# Patient Record
Sex: FEMALE | Race: WHITE | NOT HISPANIC OR LATINO | Employment: UNEMPLOYED | URBAN - NONMETROPOLITAN AREA
[De-identification: names, ages, dates, MRNs, and addresses within clinical notes are randomized per-mention and may not be internally consistent; named-entity substitution may affect disease eponyms.]

---

## 2017-01-11 ENCOUNTER — LAB (OUTPATIENT)
Dept: CARDIOLOGY | Facility: CLINIC | Age: 57
End: 2017-01-11

## 2017-01-11 DIAGNOSIS — E53.8 VITAMIN B12 DEFICIENCY: Primary | ICD-10-CM

## 2017-01-11 DIAGNOSIS — E78.2 MIXED HYPERLIPIDEMIA: ICD-10-CM

## 2017-01-11 LAB
ALBUMIN SERPL-MCNC: 4.2 G/DL (ref 3.5–5)
ALBUMIN/GLOB SERPL: 1.4 G/DL (ref 1.5–2.5)
ALP SERPL-CCNC: 77 U/L (ref 46–116)
ALT SERPL W P-5'-P-CCNC: 22 U/L (ref 10–36)
ANION GAP SERPL CALCULATED.3IONS-SCNC: 6.5 MMOL/L (ref 3.6–11.2)
AST SERPL-CCNC: 20 U/L (ref 10–30)
BASOPHILS # BLD AUTO: 0.03 10*3/MM3 (ref 0–0.3)
BASOPHILS NFR BLD AUTO: 0.6 % (ref 0–2)
BILIRUB SERPL-MCNC: 0.5 MG/DL (ref 0.2–1.8)
BUN BLD-MCNC: 16 MG/DL (ref 7–21)
BUN/CREAT SERPL: 18.4 (ref 7–25)
CALCIUM SPEC-SCNC: 9.7 MG/DL (ref 7.7–10)
CHLORIDE SERPL-SCNC: 109 MMOL/L (ref 99–112)
CHOLEST SERPL-MCNC: 225 MG/DL (ref 0–200)
CK SERPL-CCNC: 102 U/L (ref 24–173)
CO2 SERPL-SCNC: 27.5 MMOL/L (ref 24.3–31.9)
CREAT BLD-MCNC: 0.87 MG/DL (ref 0.43–1.29)
DEPRECATED RDW RBC AUTO: 41.5 FL (ref 37–54)
EOSINOPHIL # BLD AUTO: 0.13 10*3/MM3 (ref 0–0.7)
EOSINOPHIL NFR BLD AUTO: 2.4 % (ref 0–5)
ERYTHROCYTE [DISTWIDTH] IN BLOOD BY AUTOMATED COUNT: 12.8 % (ref 11.5–14.5)
GFR SERPL CREATININE-BSD FRML MDRD: 67 ML/MIN/1.73
GLOBULIN UR ELPH-MCNC: 2.9 GM/DL
GLUCOSE BLD-MCNC: 90 MG/DL (ref 70–110)
HCT VFR BLD AUTO: 40.5 % (ref 37–47)
HDLC SERPL-MCNC: 48 MG/DL (ref 60–100)
HGB BLD-MCNC: 12.7 G/DL (ref 12–16)
IMM GRANULOCYTES # BLD: 0.02 10*3/MM3 (ref 0–0.03)
IMM GRANULOCYTES NFR BLD: 0.4 % (ref 0–0.5)
LDLC SERPL CALC-MCNC: 142 MG/DL (ref 0–100)
LDLC/HDLC SERPL: 2.97 {RATIO}
LYMPHOCYTES # BLD AUTO: 1.71 10*3/MM3 (ref 1–3)
LYMPHOCYTES NFR BLD AUTO: 32 % (ref 21–51)
MCH RBC QN AUTO: 28.2 PG (ref 27–33)
MCHC RBC AUTO-ENTMCNC: 31.4 G/DL (ref 33–37)
MCV RBC AUTO: 90 FL (ref 80–94)
MONOCYTES # BLD AUTO: 0.46 10*3/MM3 (ref 0.1–0.9)
MONOCYTES NFR BLD AUTO: 8.6 % (ref 0–10)
NEUTROPHILS # BLD AUTO: 2.99 10*3/MM3 (ref 1.4–6.5)
NEUTROPHILS NFR BLD AUTO: 56 % (ref 30–70)
OSMOLALITY SERPL CALC.SUM OF ELEC: 285.7 MOSM/KG (ref 273–305)
PLATELET # BLD AUTO: 245 10*3/MM3 (ref 130–400)
PMV BLD AUTO: 10.4 FL (ref 6–10)
POTASSIUM BLD-SCNC: 4.3 MMOL/L (ref 3.5–5.3)
PROT SERPL-MCNC: 7.1 G/DL (ref 6–8)
RBC # BLD AUTO: 4.5 10*6/MM3 (ref 4.2–5.4)
SODIUM BLD-SCNC: 143 MMOL/L (ref 135–153)
TRIGL SERPL-MCNC: 173 MG/DL (ref 0–150)
VIT B12 BLD-MCNC: 561 PG/ML (ref 211–911)
VLDLC SERPL-MCNC: 34.6 MG/DL
WBC NRBC COR # BLD: 5.34 10*3/MM3 (ref 4.5–12.5)

## 2017-01-11 PROCEDURE — 82607 VITAMIN B-12: CPT | Performed by: INTERNAL MEDICINE

## 2017-01-11 PROCEDURE — 82550 ASSAY OF CK (CPK): CPT | Performed by: INTERNAL MEDICINE

## 2017-01-11 PROCEDURE — 85025 COMPLETE CBC W/AUTO DIFF WBC: CPT | Performed by: INTERNAL MEDICINE

## 2017-01-11 PROCEDURE — 80053 COMPREHEN METABOLIC PANEL: CPT | Performed by: INTERNAL MEDICINE

## 2017-01-11 PROCEDURE — 80061 LIPID PANEL: CPT | Performed by: INTERNAL MEDICINE

## 2017-01-13 ENCOUNTER — OFFICE VISIT (OUTPATIENT)
Dept: CARDIOLOGY | Facility: CLINIC | Age: 57
End: 2017-01-13

## 2017-01-13 VITALS
HEIGHT: 67 IN | HEART RATE: 75 BPM | DIASTOLIC BLOOD PRESSURE: 84 MMHG | SYSTOLIC BLOOD PRESSURE: 136 MMHG | WEIGHT: 203.2 LBS | OXYGEN SATURATION: 98 % | BODY MASS INDEX: 31.89 KG/M2

## 2017-01-13 DIAGNOSIS — J30.2 OTHER SEASONAL ALLERGIC RHINITIS: ICD-10-CM

## 2017-01-13 DIAGNOSIS — F32.A DEPRESSION, UNSPECIFIED DEPRESSION TYPE: ICD-10-CM

## 2017-01-13 DIAGNOSIS — E78.2 MIXED HYPERLIPIDEMIA: Primary | ICD-10-CM

## 2017-01-13 DIAGNOSIS — K21.9 GASTROESOPHAGEAL REFLUX DISEASE WITHOUT ESOPHAGITIS: ICD-10-CM

## 2017-01-13 DIAGNOSIS — I10 ESSENTIAL HYPERTENSION: ICD-10-CM

## 2017-01-13 PROCEDURE — 99214 OFFICE O/P EST MOD 30 MIN: CPT | Performed by: INTERNAL MEDICINE

## 2017-01-13 RX ORDER — ATORVASTATIN CALCIUM 10 MG/1
10 TABLET, FILM COATED ORAL DAILY
Qty: 90 TABLET | Refills: 3 | Status: SHIPPED | OUTPATIENT
Start: 2017-01-13 | End: 2017-08-23

## 2017-01-13 RX ORDER — LISINOPRIL 10 MG/1
10 TABLET ORAL DAILY
Qty: 90 TABLET | Refills: 1 | Status: SHIPPED | OUTPATIENT
Start: 2017-01-13 | End: 2017-08-23

## 2017-01-13 RX ORDER — LORATADINE 10 MG/1
10 CAPSULE, LIQUID FILLED ORAL DAILY
Qty: 90 EACH | Refills: 1 | Status: SHIPPED | OUTPATIENT
Start: 2017-01-13 | End: 2017-08-23 | Stop reason: SDUPTHER

## 2017-01-13 RX ORDER — ONDANSETRON 4 MG/1
4 TABLET, FILM COATED ORAL EVERY 8 HOURS PRN
Qty: 30 TABLET | Refills: 0 | Status: SHIPPED | OUTPATIENT
Start: 2017-01-13 | End: 2017-08-23 | Stop reason: SDUPTHER

## 2017-01-13 RX ORDER — PANTOPRAZOLE SODIUM 40 MG/1
40 TABLET, DELAYED RELEASE ORAL DAILY
Qty: 90 TABLET | Refills: 1 | Status: SHIPPED | OUTPATIENT
Start: 2017-01-13 | End: 2017-08-26 | Stop reason: SDUPTHER

## 2017-01-13 NOTE — MR AVS SNAPSHOT
Sona Rosa   1/13/2017 3:00 PM   Office Visit    Dept Phone:  [phone]   Encounter #:  [id number]    Provider:  Evelyn Espinoza MD   Department:  [facility] CARDIOLOGY                Your Full Care Plan              Today's Medication Changes          These changes are accurate as of: 1/13/17  3:50 PM.  If you have any questions, ask your nurse or doctor.               New Medication(s)Ordered:     atorvastatin 10 MG tablet   Commonly known as:  LIPITOR   Take 1 tablet by mouth Daily.   Started by:  Evelyn Espinoza MD            Where to Get Your Medications      These medications were sent to [facility] [address] [place] [address] - [address] AT [facility] [address] - [phone] PH - [phone] [place] [address], [facility] [address]     Phone:  [phone]     atorvastatin 10 MG tablet    lisinopril 10 MG tablet    Loratadine 10 MG capsule    ondansetron 4 MG tablet    pantoprazole 40 MG EC tablet    sertraline 50 MG tablet                  Your Updated Medication List          This list is accurate as of: 1/13/17  3:50 PM.  Always use your most recent med list.                atorvastatin 10 MG tablet   Commonly known as:  LIPITOR   Take 1 tablet by mouth Daily.       B-12 IJ       cholecalciferol 1000 UNITS tablet   Commonly known as:  VITAMIN D3       lisinopril 10 MG tablet   Commonly known as:  PRINIVIL,ZESTRIL   Take 1 tablet by mouth Daily.       Loratadine 10 MG capsule   Commonly known as:  CLARITIN   Take 10 mg by mouth Daily.       ondansetron 4 MG tablet   Commonly known as:  ZOFRAN   Take 1 tablet by mouth Every 8 (Eight) Hours As Needed for nausea or vomiting.       oxybutynin XL 10 MG 24 hr tablet   Commonly known as:  DITROPAN XL   Take 1 tablet by mouth Daily.       pantoprazole 40 MG EC tablet   Commonly known as:  PROTONIX   Take 1 tablet by mouth Daily.       sertraline 50 MG tablet   Commonly  known as:  ZOLOFT   Take 1 tablet by mouth Daily.               You Were Diagnosed With        Codes Comments    Mixed hyperlipidemia    -  Primary ICD-10-CM: E78.2  ICD-9-CM: 272.2     Gastroesophageal reflux disease without esophagitis     ICD-10-CM: K21.9  ICD-9-CM: 530.81     Essential hypertension     ICD-10-CM: I10  ICD-9-CM: 401.9     Other seasonal allergic rhinitis     ICD-10-CM: J30.2  ICD-9-CM: 477.8     Depression, unspecified depression type     ICD-10-CM: F32.9  ICD-9-CM: 311       Instructions     None    Patient Instructions History      Upcoming Appointments     Visit Type Date Time Department    FOLLOW UP 2017  3:00 PM [facility] [place]    LAB 3/27/2017  9:20 AM [facility] [place]    FOLLOW UP 3/31/2017  3:00 PM [facility] Signup     [facility] [employer] allows you to send messages to your doctor, view your test results, renew your prescriptions, schedule appointments, and more. To sign up, go to [employer] and click on the Sign Up Now link in the New User? box. Enter your [employer] Activation Code exactly as it appears below along with the last four digits of your Social Security Number and your Date of Birth () to complete the sign-up process. If you do not sign up before the expiration date, you must request a new code.    [employer] Activation Code: [email or web address]  Expires: 2017 10:15 AM    If you have questions, you can email [email or web address] or call [phone] to talk to our [employer] staff. Remember, [employer] is NOT to be used for urgent needs. For medical emergencies, dial 911.               Other Info from Your Visit           Your Appointments     Mar 27, 2017  9:20 AM EDT   Lab with [facility], CARD COR   [facility] CARDIOLOGY (--)    [address] [place] [place] [address]   [phone]            Mar 31, 2017  3:00 PM EDT   Follow Up with Evelyn Espinoza MD   [facility]  "CARDIOLOGY (--)    [address] [place] [address] [phone]           Arrive 15 minutes prior to appointment.              Allergies     No Known Allergies      Reason for Visit     Palpitations     Hypertension     Hyperlipidemia     Heartburn           Vital Signs     Blood Pressure Pulse Height Weight Oxygen Saturation Body Mass Index    136/84 (BP Location: Left arm, Patient Position: Sitting) 75 67\" (170.2 cm) 203 lb 3.2 oz (92.2 kg) 98% 31.83 kg/m2    Smoking Status                   Never Smoker           Problems and Diagnoses Noted     Acid reflux disease    High cholesterol or triglycerides    High blood pressure    Other seasonal allergic rhinitis        Depression            "

## 2017-01-13 NOTE — PROGRESS NOTES
subjective     Chief Complaint   Patient presents with   • Palpitations   • Hypertension   • Hyperlipidemia   • Heartburn     History of Present Illness  Fatigue  Patient is under a lot of stress.  She states that she is always tired and sleepy but unable to sleep  Patient is taking Zoloft.  It was discussed with her the Zoloft might be causing fatigue.  We could change it to effoxor are decreased the dose however patient wants to continue current therapy.    HYPERTENSION  Sona Rosa has long-standing essential hypertension for years. she is taking medications regularly. There are no medication side effects. Blood pressure is very well controlled. There has been no headache nausea chest pain. There has been no syncopal or presyncopal episode. she denies episodes of hypo-tension or accelerated hypertension.    Hyperlipidemia  Sona Rosa has long-standing history of hyperlipidemia. Has been trying to lose weight following diet and exercise.  Patient refuses to take statin therapy.  She was given Crestor samples last time but she states that she did not take any.  Lab work shows elevated lipids.     Patient still has off and on palpitations. Holter monitor did not show any significant arrhythmia. Other options including event monitor were discussed with the patient. Patient states that she is not very symptomatic and will continue current medications at this time. She does not have any chest pain or shortness of breath.  She complains of mild nausea and allergies wants Zofran and refill and loratadine     Patient Active Problem List   Diagnosis   • Hypertension   • Hyperlipidemia   • B12 deficiency   • GERD (gastroesophageal reflux disease)   • Fatigue   • Anxiety   • Palpitation   • Urgency incontinence       Social History   Substance Use Topics   • Smoking status: Never Smoker   • Smokeless tobacco: Never Used   • Alcohol use No       No Known Allergies      Current Outpatient Prescriptions:   •   "cholecalciferol (VITAMIN D3) 1000 UNITS tablet, Take 1,000 Units by mouth daily., Disp: , Rfl:   •  Cyanocobalamin (B-12 IJ), Inject 1 mL as directed every 30 (thirty) days., Disp: , Rfl:   •  lisinopril (PRINIVIL,ZESTRIL) 10 MG tablet, Take 1 tablet by mouth Daily., Disp: 90 tablet, Rfl: 1  •  Loratadine (CLARITIN) 10 MG capsule, Take 10 mg by mouth Daily., Disp: 90 each, Rfl: 1  •  ondansetron (ZOFRAN) 4 MG tablet, Take 1 tablet by mouth Every 8 (Eight) Hours As Needed for nausea or vomiting., Disp: 30 tablet, Rfl: 0  •  oxybutynin XL (DITROPAN XL) 10 MG 24 hr tablet, Take 1 tablet by mouth Daily., Disp: 30 tablet, Rfl: 11  •  pantoprazole (PROTONIX) 40 MG EC tablet, Take 1 tablet by mouth Daily., Disp: 90 tablet, Rfl: 1  •  sertraline (ZOLOFT) 50 MG tablet, Take 1 tablet by mouth Daily., Disp: 90 tablet, Rfl: 0  •  atorvastatin (LIPITOR) 10 MG tablet, Take 1 tablet by mouth Daily., Disp: 90 tablet, Rfl: 3      The following portions of the patient's history were reviewed and updated as appropriate: allergies, current medications, past family history, past medical history, past social history, past surgical history and problem list.    Review of Systems   Constitution: Positive for weakness and malaise/fatigue.   HENT: Negative.    Eyes: Negative.    Cardiovascular: Negative.    Respiratory: Negative.    Hematologic/Lymphatic: Negative.    Skin: Negative.    Musculoskeletal: Positive for arthritis, back pain and myalgias.   Gastrointestinal: Negative.    Genitourinary: Negative.    Psychiatric/Behavioral: Positive for depression. The patient has insomnia and is nervous/anxious.           Objective:     Visit Vitals   • /84 (BP Location: Left arm, Patient Position: Sitting)   • Pulse 75   • Ht 67\" (170.2 cm)   • Wt 203 lb 3.2 oz (92.2 kg)   • SpO2 98%   • BMI 31.83 kg/m2     Physical Exam   Constitutional: She appears well-developed and well-nourished.   HENT:   Head: Normocephalic and atraumatic. "   Mouth/Throat: Oropharynx is clear and moist.   Eyes: Conjunctivae and EOM are normal. Pupils are equal, round, and reactive to light. No scleral icterus.   Neck: Normal range of motion. Neck supple. No JVD present. No tracheal deviation present. No thyromegaly present.   Cardiovascular: Normal rate, regular rhythm, normal heart sounds and intact distal pulses.  Exam reveals no friction rub.    No murmur heard.  Pulmonary/Chest: Effort normal and breath sounds normal. No respiratory distress. She has no wheezes. She has no rales. She exhibits no tenderness.   Abdominal: Soft. Bowel sounds are normal. She exhibits no distension and no mass. There is no tenderness. There is no rebound and no guarding.   Musculoskeletal: Normal range of motion. She exhibits no edema, tenderness or deformity.   Lymphadenopathy:     She has no cervical adenopathy.   Neurological: She is alert. She has normal reflexes. No cranial nerve deficit. She exhibits normal muscle tone. Coordination normal.   Skin: Skin is warm and dry.   Psychiatric: She has a normal mood and affect. Her behavior is normal. Judgment and thought content normal.         Lab Review  Lab Results   Component Value Date     01/11/2017    K 4.3 01/11/2017     01/11/2017    BUN 16 01/11/2017    CREATININE 0.87 01/11/2017    GLUCOSE 90 01/11/2017    CALCIUM 9.7 01/11/2017    ALT 22 01/11/2017    ALKPHOS 77 01/11/2017    LABIL2 1.4 (L) 01/11/2017     Lab Results   Component Value Date    CKTOTAL 102 01/11/2017     Lab Results   Component Value Date    WBC 5.34 01/11/2017    HGB 12.7 01/11/2017    HCT 40.5 01/11/2017     01/11/2017     No results found for: INR  Lab Results   Component Value Date    MG 2.0 04/17/2014     Lab Results   Component Value Date    TSH 2.620 07/25/2016     No results found for: BNP  Lab Results   Component Value Date    CHLPL 172 02/02/2016     Lab Results   Component Value Date    CHOL 225 (H) 01/11/2017    TRIG 173 (H)  01/11/2017    HDL 48 (L) 01/11/2017    LDLCALC 142 (H) 01/11/2017    VLDL 34.6 01/11/2017    LDLHDL 2.97 01/11/2017         Procedures     I personally viewed and interpreted the patient's LAB data         Assessment:      Diagnosis Plan   1. Mixed hyperlipidemia     2. Gastroesophageal reflux disease without esophagitis  ondansetron (ZOFRAN) 4 MG tablet    pantoprazole (PROTONIX) 40 MG EC tablet   3. Essential hypertension  lisinopril (PRINIVIL,ZESTRIL) 10 MG tablet   4. Other seasonal allergic rhinitis  Loratadine (CLARITIN) 10 MG capsule   5. Depression, unspecified depression type  sertraline (ZOLOFT) 50 MG tablet          Plan:     Hyperlipidemia uncontrolled  Patient has been refusing statin therapy however she is willing to try this time.  She was given Lipitor 10 mg daily.  Side effects were explained.  Lab work will be checked in 3 months.    Blood pressure is very well controlled  Patient needs to lose weight  Changing Zoloft to effoxor was discussed however patient wants to continue current medications.  Follow-up scheduled.      The nature of cardiac risk has been fully discussed with this patient. I have made her aware of her LDL target goal given her cardiovascular risk analysis. I have discussed the appropriate diet. The need for lifelong compliance in order to reduce risk is stressed. A regular exercise program is recommended to help achieve and maintain normal body weight, fitness and improve lipid balance.    Return in about 3 months (around 4/13/2017).

## 2017-03-21 RX ORDER — CYANOCOBALAMIN 1000 UG/ML
INJECTION, SOLUTION INTRAMUSCULAR; SUBCUTANEOUS
Qty: 3 ML | Refills: 0 | OUTPATIENT
Start: 2017-03-21

## 2017-03-28 DIAGNOSIS — K21.9 GASTROESOPHAGEAL REFLUX DISEASE WITHOUT ESOPHAGITIS: ICD-10-CM

## 2017-03-28 RX ORDER — ONDANSETRON 4 MG/1
TABLET, FILM COATED ORAL
Qty: 30 TABLET | Refills: 0 | OUTPATIENT
Start: 2017-03-28

## 2017-06-22 DIAGNOSIS — F32.A DEPRESSION: ICD-10-CM

## 2017-07-25 ENCOUNTER — OFFICE VISIT (OUTPATIENT)
Dept: UROLOGY | Facility: CLINIC | Age: 57
End: 2017-07-25

## 2017-07-25 VITALS — BODY MASS INDEX: 31.86 KG/M2 | HEIGHT: 67 IN | WEIGHT: 203 LBS

## 2017-07-25 DIAGNOSIS — N39.41 URGENCY INCONTINENCE: ICD-10-CM

## 2017-07-25 DIAGNOSIS — R35.0 FREQUENCY OF URINATION: Primary | ICD-10-CM

## 2017-07-25 DIAGNOSIS — N39.0 URINARY TRACT INFECTION, SITE UNSPECIFIED: ICD-10-CM

## 2017-07-25 PROCEDURE — 99214 OFFICE O/P EST MOD 30 MIN: CPT | Performed by: UROLOGY

## 2017-07-25 RX ORDER — CYANOCOBALAMIN 1000 UG/ML
INJECTION, SOLUTION INTRAMUSCULAR; SUBCUTANEOUS
Refills: 6 | COMMUNITY
Start: 2017-06-22 | End: 2018-04-23 | Stop reason: SDUPTHER

## 2017-07-25 RX ORDER — SOLIFENACIN SUCCINATE 10 MG/1
10 TABLET, FILM COATED ORAL DAILY
Qty: 30 TABLET | Refills: 11 | Status: SHIPPED | OUTPATIENT
Start: 2017-07-25 | End: 2017-10-24 | Stop reason: SDUPTHER

## 2017-07-25 NOTE — PROGRESS NOTES
Chief Complaint:          Chief Complaint   Patient presents with   • Urinary Incontinence     FU for Urge Incontinence       HPI:   57 y.o. female.    HPI  Pt has dry mouth and constipation.  She also is concerned about ditropan effecting her memory which she has noticed that she has started having problems with.  Pt has been on ditropan for frequency and urgency incontinence.      Past Medical History:        Past Medical History:   Diagnosis Date   • B12 deficiency    • Fatigue    • GERD (gastroesophageal reflux disease)    • Hyperlipidemia    • Hypertension          Current Meds:     Current Outpatient Prescriptions   Medication Sig Dispense Refill   • atorvastatin (LIPITOR) 10 MG tablet Take 1 tablet by mouth Daily. 90 tablet 3   • cholecalciferol (VITAMIN D3) 1000 UNITS tablet Take 1,000 Units by mouth daily.     • cyanocobalamin 1000 MCG/ML injection INJECT 1 MLS AS DIRECTED ONCE A MONTH  6   • lisinopril (PRINIVIL,ZESTRIL) 10 MG tablet Take 1 tablet by mouth Daily. 90 tablet 1   • Loratadine (CLARITIN) 10 MG capsule Take 10 mg by mouth Daily. 90 each 1   • ondansetron (ZOFRAN) 4 MG tablet Take 1 tablet by mouth Every 8 (Eight) Hours As Needed for nausea or vomiting. 30 tablet 0   • oxybutynin XL (DITROPAN XL) 10 MG 24 hr tablet Take 1 tablet by mouth Daily. 30 tablet 11   • pantoprazole (PROTONIX) 40 MG EC tablet Take 1 tablet by mouth Daily. 90 tablet 1   • sertraline (ZOLOFT) 50 MG tablet Take 1 tablet by mouth Daily. 90 tablet 0   • sertraline (ZOLOFT) 50 MG tablet TAKE 1 TABLET BY MOUTH DAILY 90 tablet 0     No current facility-administered medications for this visit.         Allergies:      No Known Allergies     Past Surgical History:     Past Surgical History:   Procedure Laterality Date   •  SECTION     • KIDNEY SURGERY           Social History:     Social History     Social History   • Marital status:      Spouse name: N/A   • Number of children: N/A   • Years of education: N/A      Occupational History   • Not on file.     Social History Main Topics   • Smoking status: Never Smoker   • Smokeless tobacco: Never Used   • Alcohol use No   • Drug use: No   • Sexual activity: Defer     Other Topics Concern   • Not on file     Social History Narrative       Family History:     Family History   Problem Relation Age of Onset   • Hyperlipidemia Mother    • Hypertension Mother    • Heart attack Father 65   • Cancer Father    • Hyperlipidemia Brother    • Hypertension Brother        Review of Systems:     Review of Systems    Physical Exam:     Physical Exam    Procedure:     No notes on file      Assessment:     Encounter Diagnoses   Name Primary?   • Frequency of urination Yes   • Urinary tract infection, site unspecified    • Urgency incontinence      No orders of the defined types were placed in this encounter.      Plan:   I think pt should stop ditropan because of side effects and it's effect on  Dementia.  I think she should try vesicare.  I think that with her recurrent uti's she should start on suppressive antibiotic therapy qhs.  Will see pt back in 3 months.    Counseling was given to patient for the following topics instructions for management and risks and benefits of treatment options. and the interim medical history and current results were reviewed.  A treatment plan with follow-up was made. Total time of the encounter was 35 minutes and 35 minutes were spent discussing Frequency of urination [R35.0] face-to-face.        This document has been electronically signed by Viraj Benito MD July 25, 2017 10:59 AM

## 2017-08-23 ENCOUNTER — OFFICE VISIT (OUTPATIENT)
Dept: CARDIOLOGY | Facility: CLINIC | Age: 57
End: 2017-08-23

## 2017-08-23 VITALS
BODY MASS INDEX: 30.51 KG/M2 | HEART RATE: 64 BPM | HEIGHT: 67 IN | OXYGEN SATURATION: 98 % | SYSTOLIC BLOOD PRESSURE: 168 MMHG | WEIGHT: 194.4 LBS | DIASTOLIC BLOOD PRESSURE: 110 MMHG

## 2017-08-23 DIAGNOSIS — E78.2 MIXED HYPERLIPIDEMIA: Primary | ICD-10-CM

## 2017-08-23 DIAGNOSIS — N39.41 URGENCY INCONTINENCE: ICD-10-CM

## 2017-08-23 DIAGNOSIS — K21.9 GASTROESOPHAGEAL REFLUX DISEASE WITHOUT ESOPHAGITIS: ICD-10-CM

## 2017-08-23 DIAGNOSIS — F41.9 ANXIETY: ICD-10-CM

## 2017-08-23 DIAGNOSIS — J30.2 OTHER SEASONAL ALLERGIC RHINITIS: ICD-10-CM

## 2017-08-23 DIAGNOSIS — I10 ESSENTIAL HYPERTENSION: ICD-10-CM

## 2017-08-23 PROCEDURE — 99214 OFFICE O/P EST MOD 30 MIN: CPT | Performed by: INTERNAL MEDICINE

## 2017-08-23 RX ORDER — EZETIMIBE 10 MG/1
10 TABLET ORAL DAILY
Qty: 90 TABLET | Refills: 3 | Status: SHIPPED | OUTPATIENT
Start: 2017-08-23 | End: 2018-01-22

## 2017-08-23 RX ORDER — LORATADINE 10 MG/1
10 CAPSULE, LIQUID FILLED ORAL DAILY
Qty: 90 EACH | Refills: 1 | Status: SHIPPED | OUTPATIENT
Start: 2017-08-23 | End: 2018-01-22

## 2017-08-23 RX ORDER — LISINOPRIL 10 MG/1
10 TABLET ORAL 2 TIMES DAILY
Qty: 180 TABLET | Refills: 1 | Status: SHIPPED | OUTPATIENT
Start: 2017-08-23 | End: 2018-01-22 | Stop reason: SDUPTHER

## 2017-08-23 RX ORDER — ROSUVASTATIN CALCIUM 5 MG/1
5 TABLET, COATED ORAL DAILY
Qty: 30 TABLET | Refills: 11 | Status: SHIPPED | OUTPATIENT
Start: 2017-08-23 | End: 2018-01-22

## 2017-08-23 RX ORDER — ONDANSETRON 4 MG/1
4 TABLET, FILM COATED ORAL EVERY 8 HOURS PRN
Qty: 30 TABLET | Refills: 0 | Status: SHIPPED | OUTPATIENT
Start: 2017-08-23 | End: 2018-03-09 | Stop reason: SDUPTHER

## 2017-08-23 NOTE — PROGRESS NOTES
subjective     Chief Complaint   Patient presents with   • Hypertension   • Hyperlipidemia   • Anxiety   • Depression     History of Present Illness  Patient is here for follow-up of hypertension, hyperlipidemia anxiety and depression.  Patient states that recently she had a chest pain episode.  She went to the [facility] in [place] in 2017 blood pressure was high.  She underwent cardiac workup.  Echocardiogram and stress test were normal.    Since then she has been taking lisinopril 10 mg daily.  Blood pressure is high but patient blames it on stress.  She has not taken blood pressure medicine today because she takes it at 10:00 in the morning everyday.  Blood pressure is quite high and was initially 160/110 and is 160/98 now.    Patient also has significant hyperlipidemia she is not taking any medications.  She has intolerance of most of the statins.  She was taking  Lipitor but had to stop it because of myalgias.  Lipids are significantly abnormal.    Patient has stress incontinence and is taking Vesicare with that she seems to be doing better.    Severe anxiety , she is taking Zoloft but still under a lot of stress.  Patient does not wish to see a psychiatrist.    Gastroesophageal reflux disorder and the controlled with Protonix she has to take Zofran off and on.    Past Surgical History:   Procedure Laterality Date   • CARDIOVASCULAR STRESS TEST  2017   •  SECTION     • ECHO - CONVERTED  2017   • KIDNEY SURGERY       Family History   Problem Relation Age of Onset   • Hyperlipidemia Mother    • Hypertension Mother    • Heart attack Father 65   • Cancer Father    • Hyperlipidemia Brother    • Hypertension Brother      Past Medical History:   Diagnosis Date   • B12 deficiency    • Fatigue    • GERD (gastroesophageal reflux disease)    • Hyperlipidemia    • Hypertension      Patient Active Problem List   Diagnosis   • Hypertension   • Hyperlipidemia   • B12 deficiency   • GERD  (gastroesophageal reflux disease)   • Fatigue   • Anxiety   • Palpitation   • Urgency incontinence       Social History   Substance Use Topics   • Smoking status: Never Smoker   • Smokeless tobacco: Never Used   • Alcohol use No       No Known Allergies    Current Outpatient Prescriptions on File Prior to Visit   Medication Sig   • cholecalciferol (VITAMIN D3) 1000 UNITS tablet Take 1,000 Units by mouth daily.   • cyanocobalamin 1000 MCG/ML injection INJECT 1 MLS AS DIRECTED ONCE A MONTH   • pantoprazole (PROTONIX) 40 MG EC tablet Take 1 tablet by mouth Daily.   • sertraline (ZOLOFT) 50 MG tablet Take 1 tablet by mouth Daily.   • solifenacin (VESICARE) 10 MG tablet Take 1 tablet by mouth Daily.   • [DISCONTINUED] lisinopril (PRINIVIL,ZESTRIL) 10 MG tablet Take 1 tablet by mouth Daily.   • [DISCONTINUED] Loratadine (CLARITIN) 10 MG capsule Take 10 mg by mouth Daily.   • [DISCONTINUED] ondansetron (ZOFRAN) 4 MG tablet Take 1 tablet by mouth Every 8 (Eight) Hours As Needed for nausea or vomiting.   • [DISCONTINUED] atorvastatin (LIPITOR) 10 MG tablet Take 1 tablet by mouth Daily.   • [DISCONTINUED] sertraline (ZOLOFT) 50 MG tablet TAKE 1 TABLET BY MOUTH DAILY     No current facility-administered medications on file prior to visit.          The following portions of the patient's history were reviewed and updated as appropriate: allergies, current medications, past family history, past medical history, past social history, past surgical history and problem list.    Review of Systems   Constitution: Negative.   HENT: Negative.  Negative for congestion and headaches.    Eyes: Negative.    Cardiovascular: Negative.  Negative for chest pain, cyanosis, dyspnea on exertion, irregular heartbeat, leg swelling, near-syncope, orthopnea, palpitations, paroxysmal nocturnal dyspnea and syncope.   Respiratory: Negative.  Negative for shortness of breath.    Hematologic/Lymphatic: Negative.    Skin: Negative.    Musculoskeletal:  "Negative.    Gastrointestinal: Positive for heartburn and nausea.   Genitourinary: Positive for bladder incontinence.   Neurological: Negative.    Psychiatric/Behavioral: The patient is nervous/anxious.           Objective:     BP (!) 168/110 (BP Location: Left arm, Patient Position: Sitting)  Pulse 64  Ht 67\" (170.2 cm)  Wt 194 lb 6.4 oz (88.2 kg)  SpO2 98%  BMI 30.45 kg/m2  Physical Exam   Constitutional: She appears well-developed and well-nourished. No distress.   HENT:   Head: Normocephalic and atraumatic.   Mouth/Throat: Oropharynx is clear and moist. No oropharyngeal exudate.   Eyes: Conjunctivae and EOM are normal. Pupils are equal, round, and reactive to light. No scleral icterus.   Neck: Normal range of motion. Neck supple. No JVD present. No tracheal deviation present. No thyromegaly present.   Cardiovascular: Normal rate, regular rhythm, normal heart sounds and intact distal pulses.  PMI is not displaced.  Exam reveals no gallop, no friction rub and no decreased pulses.    No murmur heard.  Pulses:       Carotid pulses are 3+ on the right side, and 3+ on the left side.       Radial pulses are 3+ on the right side, and 3+ on the left side.   Pulmonary/Chest: Effort normal and breath sounds normal. No respiratory distress. She has no wheezes. She has no rales. She exhibits no tenderness.   Abdominal: Soft. Bowel sounds are normal. She exhibits no distension, no abdominal bruit and no mass. There is no splenomegaly or hepatomegaly. There is no tenderness. There is no rebound and no guarding.   Musculoskeletal: Normal range of motion. She exhibits no edema, tenderness or deformity.   Lymphadenopathy:     She has no cervical adenopathy.   Neurological: She is alert. She has normal reflexes. No cranial nerve deficit. She exhibits normal muscle tone. Coordination normal.   Skin: Skin is warm and dry. No rash noted. She is not diaphoretic. No erythema.   Psychiatric: She has a normal mood and affect. Her " behavior is normal. Judgment and thought content normal.         Lab Review  From [facility] dated June 2017 CBC normal, comprehensive metabolic panel normal  Cholesterol 200, triglyceride 163,  HDL 42  Hemoglobin A1c 5.2 TSH 2.6    Procedures       I personally viewed and interpreted the patient's LAB data         Assessment:     1. Mixed hyperlipidemia    2. Other seasonal allergic rhinitis    3. Gastroesophageal reflux disease without esophagitis    4. Essential hypertension    5. Urgency incontinence    6. Anxiety          Plan:      Patient has significant hyperlipidemia lab was reviewed from [facility].  Cholesterol is 200.  Patient was advised to take Crestor and Zetia.  Aggressive risk factor modification emphasized.    Stress test and echo was obtained from the hospital including H&P and discharge summary.  EKG did not show any acute changes.  Lexiscan stress test was normal with ejection fraction of 80%.    Echocardiogram was also normal.  Patient is not having any further symptoms at this time.  It was felt to be due to anxiety.    Blood pressure is not very well controlled.  She was advised to increase lisinopril 10 mg twice a day.  Salt restriction was emphasized.    Refills of all the medications were given.  Patient will check blood pressure and keep a record.  She is given instruction to increase lisinopril up to 40 mg if needed.    Follow-up scheduled        No Follow-up on file.

## 2017-08-24 ENCOUNTER — TELEPHONE (OUTPATIENT)
Dept: CARDIOLOGY | Facility: CLINIC | Age: 57
End: 2017-08-24

## 2017-08-26 DIAGNOSIS — I10 ESSENTIAL HYPERTENSION: ICD-10-CM

## 2017-08-26 DIAGNOSIS — K21.9 GASTROESOPHAGEAL REFLUX DISEASE WITHOUT ESOPHAGITIS: ICD-10-CM

## 2017-08-28 RX ORDER — LISINOPRIL 10 MG/1
TABLET ORAL
Qty: 90 TABLET | Refills: 0 | Status: SHIPPED | OUTPATIENT
Start: 2017-08-28 | End: 2018-04-23 | Stop reason: ALTCHOICE

## 2017-08-28 RX ORDER — PANTOPRAZOLE SODIUM 40 MG/1
TABLET, DELAYED RELEASE ORAL
Qty: 90 TABLET | Refills: 0 | Status: SHIPPED | OUTPATIENT
Start: 2017-08-28 | End: 2018-01-11 | Stop reason: SDUPTHER

## 2017-10-24 ENCOUNTER — OFFICE VISIT (OUTPATIENT)
Dept: UROLOGY | Facility: CLINIC | Age: 57
End: 2017-10-24

## 2017-10-24 VITALS — HEIGHT: 67 IN | BODY MASS INDEX: 30.45 KG/M2 | WEIGHT: 194 LBS

## 2017-10-24 DIAGNOSIS — R35.0 FREQUENCY OF URINATION: Primary | ICD-10-CM

## 2017-10-24 DIAGNOSIS — N39.41 URGENCY INCONTINENCE: ICD-10-CM

## 2017-10-24 LAB
BILIRUB BLD-MCNC: NEGATIVE MG/DL
CLARITY, POC: CLEAR
COLOR UR: YELLOW
GLUCOSE UR STRIP-MCNC: NEGATIVE MG/DL
KETONES UR QL: NEGATIVE
LEUKOCYTE EST, POC: NEGATIVE
NITRITE UR-MCNC: NEGATIVE MG/ML
PH UR: 5 [PH] (ref 5–8)
PROT UR STRIP-MCNC: NEGATIVE MG/DL
RBC # UR STRIP: NEGATIVE /UL
SP GR UR: 1 (ref 1–1.03)
UROBILINOGEN UR QL: NORMAL

## 2017-10-24 PROCEDURE — 51798 US URINE CAPACITY MEASURE: CPT | Performed by: UROLOGY

## 2017-10-24 PROCEDURE — 99213 OFFICE O/P EST LOW 20 MIN: CPT | Performed by: UROLOGY

## 2017-10-24 RX ORDER — SOLIFENACIN SUCCINATE 10 MG/1
10 TABLET, FILM COATED ORAL DAILY
Qty: 30 TABLET | Refills: 11 | Status: SHIPPED | OUTPATIENT
Start: 2017-10-24 | End: 2018-04-24 | Stop reason: SDUPTHER

## 2017-10-24 NOTE — PROGRESS NOTES
Chief Complaint:          Chief Complaint   Patient presents with   • Frequency of urination     3 month follow up        HPI:   57 y.o. female.    HPI  PT takes 10 mg of vesicare.  She is doing a lot better and when she had ditropan she had a lot of dry mouth and constipation and she had memory problems.  She has less nocturia and frequency on vesicare..  Her post void bladder scan on vesicare was only 18 cc today.      Past Medical History:        Past Medical History:   Diagnosis Date   • B12 deficiency    • Fatigue    • GERD (gastroesophageal reflux disease)    • Hyperlipidemia    • Hypertension          Current Meds:     Current Outpatient Prescriptions   Medication Sig Dispense Refill   • cholecalciferol (VITAMIN D3) 1000 UNITS tablet Take 1,000 Units by mouth daily.     • cyanocobalamin 1000 MCG/ML injection INJECT 1 MLS AS DIRECTED ONCE A MONTH  6   • ezetimibe (ZETIA) 10 MG tablet Take 1 tablet by mouth Daily. 90 tablet 3   • lisinopril (PRINIVIL,ZESTRIL) 10 MG tablet Take 1 tablet by mouth 2 (Two) Times a Day. 180 tablet 1   • lisinopril (PRINIVIL,ZESTRIL) 10 MG tablet TAKE 1 TABLET BY MOUTH DAILY 90 tablet 0   • Loratadine (CLARITIN) 10 MG capsule Take 10 mg by mouth Daily. 90 each 1   • ondansetron (ZOFRAN) 4 MG tablet Take 1 tablet by mouth Every 8 (Eight) Hours As Needed for Nausea or Vomiting. 30 tablet 0   • pantoprazole (PROTONIX) 40 MG EC tablet TAKE 1 TABLET BY MOUTH DAILY 90 tablet 0   • rosuvastatin (CRESTOR) 5 MG tablet Take 1 tablet by mouth Daily. 30 tablet 11   • sertraline (ZOLOFT) 50 MG tablet Take 1 tablet by mouth Daily. 90 tablet 0   • solifenacin (VESICARE) 10 MG tablet Take 1 tablet by mouth Daily. 30 tablet 11     No current facility-administered medications for this visit.         Allergies:      No Known Allergies     Past Surgical History:     Past Surgical History:   Procedure Laterality Date   • CARDIOVASCULAR STRESS TEST  2017   •  SECTION     • ECHO - CONVERTED   06/14/2017   • KIDNEY SURGERY           Social History:     Social History     Social History   • Marital status:      Spouse name: N/A   • Number of children: N/A   • Years of education: N/A     Occupational History   • Not on file.     Social History Main Topics   • Smoking status: Never Smoker   • Smokeless tobacco: Never Used   • Alcohol use No   • Drug use: No   • Sexual activity: Defer     Other Topics Concern   • Not on file     Social History Narrative       Family History:     Family History   Problem Relation Age of Onset   • Hyperlipidemia Mother    • Hypertension Mother    • Heart attack Father 65   • Cancer Father    • Hyperlipidemia Brother    • Hypertension Brother        Review of Systems:     Review of Systems    Physical Exam:     Physical Exam    Procedure:     No notes on file      Assessment:     Encounter Diagnosis   Name Primary?   • Frequency of urination Yes     Orders Placed This Encounter   Procedures   • Bladder Scan   • POC Urinalysis Dipstick       Plan:   I would recommend that she stays on vesicare.  Pt can not tolerate symptoms of urgency incontinence without the medication.    Counseling was given to patient for the following topics instructions for management. and the interim medical history and current results were reviewed.  A treatment plan with follow-up was made. Total time of the encounter was 21` minutes and 21 minutes were spent discussing Frequency of urination [R35.0] face-to-face.       This document has been electronically signed by Viraj Benito MD October 24, 2017 1:31 PM

## 2017-10-27 DIAGNOSIS — F32.A DEPRESSION: ICD-10-CM

## 2017-10-27 DIAGNOSIS — F32.A DEPRESSION, UNSPECIFIED DEPRESSION TYPE: ICD-10-CM

## 2017-11-17 ENCOUNTER — LAB (OUTPATIENT)
Dept: LAB | Facility: HOSPITAL | Age: 57
End: 2017-11-17
Attending: INTERNAL MEDICINE

## 2017-11-17 DIAGNOSIS — E78.2 MIXED HYPERLIPIDEMIA: ICD-10-CM

## 2017-11-17 LAB
ALBUMIN SERPL-MCNC: 4.5 G/DL (ref 3.5–5)
ALBUMIN/GLOB SERPL: 1.7 G/DL (ref 1.5–2.5)
ALP SERPL-CCNC: 85 U/L (ref 35–104)
ALT SERPL W P-5'-P-CCNC: 20 U/L (ref 10–36)
ANION GAP SERPL CALCULATED.3IONS-SCNC: 4.9 MMOL/L (ref 3.6–11.2)
AST SERPL-CCNC: 18 U/L (ref 10–30)
BASOPHILS # BLD AUTO: 0.02 10*3/MM3 (ref 0–0.3)
BASOPHILS NFR BLD AUTO: 0.2 % (ref 0–2)
BILIRUB SERPL-MCNC: 0.5 MG/DL (ref 0.2–1.8)
BUN BLD-MCNC: 21 MG/DL (ref 7–21)
BUN/CREAT SERPL: 20 (ref 7–25)
CALCIUM SPEC-SCNC: 9.4 MG/DL (ref 7.7–10)
CHLORIDE SERPL-SCNC: 110 MMOL/L (ref 99–112)
CHOLEST SERPL-MCNC: 196 MG/DL (ref 0–200)
CK SERPL-CCNC: 82 U/L (ref 24–173)
CO2 SERPL-SCNC: 29.1 MMOL/L (ref 24.3–31.9)
CREAT BLD-MCNC: 1.05 MG/DL (ref 0.43–1.29)
DEPRECATED RDW RBC AUTO: 42 FL (ref 37–54)
EOSINOPHIL # BLD AUTO: 0.12 10*3/MM3 (ref 0–0.7)
EOSINOPHIL NFR BLD AUTO: 1.2 % (ref 0–5)
ERYTHROCYTE [DISTWIDTH] IN BLOOD BY AUTOMATED COUNT: 12.8 % (ref 11.5–14.5)
GFR SERPL CREATININE-BSD FRML MDRD: 54 ML/MIN/1.73
GLOBULIN UR ELPH-MCNC: 2.7 GM/DL
GLUCOSE BLD-MCNC: 85 MG/DL (ref 70–110)
HCT VFR BLD AUTO: 39.7 % (ref 37–47)
HDLC SERPL-MCNC: 49 MG/DL (ref 60–100)
HGB BLD-MCNC: 13.1 G/DL (ref 12–16)
IMM GRANULOCYTES # BLD: 0.03 10*3/MM3 (ref 0–0.03)
IMM GRANULOCYTES NFR BLD: 0.3 % (ref 0–0.5)
LDLC SERPL CALC-MCNC: 115 MG/DL (ref 0–100)
LDLC/HDLC SERPL: 2.34 {RATIO}
LYMPHOCYTES # BLD AUTO: 3.54 10*3/MM3 (ref 1–3)
LYMPHOCYTES NFR BLD AUTO: 34 % (ref 21–51)
MCH RBC QN AUTO: 30.1 PG (ref 27–33)
MCHC RBC AUTO-ENTMCNC: 33 G/DL (ref 33–37)
MCV RBC AUTO: 91.3 FL (ref 80–94)
MONOCYTES # BLD AUTO: 0.77 10*3/MM3 (ref 0.1–0.9)
MONOCYTES NFR BLD AUTO: 7.4 % (ref 0–10)
NEUTROPHILS # BLD AUTO: 5.93 10*3/MM3 (ref 1.4–6.5)
NEUTROPHILS NFR BLD AUTO: 56.9 % (ref 30–70)
OSMOLALITY SERPL CALC.SUM OF ELEC: 289.1 MOSM/KG (ref 273–305)
PLATELET # BLD AUTO: 243 10*3/MM3 (ref 130–400)
PMV BLD AUTO: 10.3 FL (ref 6–10)
POTASSIUM BLD-SCNC: 4 MMOL/L (ref 3.5–5.3)
PROT SERPL-MCNC: 7.2 G/DL (ref 6–8)
RBC # BLD AUTO: 4.35 10*6/MM3 (ref 4.2–5.4)
SODIUM BLD-SCNC: 144 MMOL/L (ref 135–153)
TRIGL SERPL-MCNC: 161 MG/DL (ref 0–150)
VLDLC SERPL-MCNC: 32.2 MG/DL
WBC NRBC COR # BLD: 10.41 10*3/MM3 (ref 4.5–12.5)

## 2017-11-17 PROCEDURE — 80061 LIPID PANEL: CPT | Performed by: INTERNAL MEDICINE

## 2017-11-17 PROCEDURE — 85025 COMPLETE CBC W/AUTO DIFF WBC: CPT | Performed by: INTERNAL MEDICINE

## 2017-11-17 PROCEDURE — 80053 COMPREHEN METABOLIC PANEL: CPT | Performed by: INTERNAL MEDICINE

## 2017-11-17 PROCEDURE — 82550 ASSAY OF CK (CPK): CPT | Performed by: INTERNAL MEDICINE

## 2017-12-11 RX ORDER — CYANOCOBALAMIN 1000 UG/ML
INJECTION, SOLUTION INTRAMUSCULAR; SUBCUTANEOUS
Qty: 1 ML | Refills: 0 | Status: SHIPPED | OUTPATIENT
Start: 2017-12-11 | End: 2018-01-11 | Stop reason: SDUPTHER

## 2018-01-11 DIAGNOSIS — K21.9 GASTROESOPHAGEAL REFLUX DISEASE WITHOUT ESOPHAGITIS: ICD-10-CM

## 2018-01-11 RX ORDER — PANTOPRAZOLE SODIUM 40 MG/1
TABLET, DELAYED RELEASE ORAL
Qty: 90 TABLET | Refills: 0 | Status: SHIPPED | OUTPATIENT
Start: 2018-01-11 | End: 2018-04-19 | Stop reason: SDUPTHER

## 2018-01-11 RX ORDER — CYANOCOBALAMIN 1000 UG/ML
INJECTION, SOLUTION INTRAMUSCULAR; SUBCUTANEOUS
Qty: 1 ML | Refills: 0 | Status: SHIPPED | OUTPATIENT
Start: 2018-01-11 | End: 2018-04-23 | Stop reason: SDUPTHER

## 2018-01-15 ENCOUNTER — TELEPHONE (OUTPATIENT)
Dept: UROLOGY | Facility: CLINIC | Age: 58
End: 2018-01-15

## 2018-01-22 ENCOUNTER — OFFICE VISIT (OUTPATIENT)
Dept: CARDIOLOGY | Facility: CLINIC | Age: 58
End: 2018-01-22

## 2018-01-22 VITALS
OXYGEN SATURATION: 97 % | DIASTOLIC BLOOD PRESSURE: 84 MMHG | HEART RATE: 76 BPM | BODY MASS INDEX: 31.71 KG/M2 | WEIGHT: 202 LBS | RESPIRATION RATE: 16 BRPM | SYSTOLIC BLOOD PRESSURE: 126 MMHG | HEIGHT: 67 IN

## 2018-01-22 DIAGNOSIS — I10 ESSENTIAL HYPERTENSION: ICD-10-CM

## 2018-01-22 DIAGNOSIS — E78.2 MIXED HYPERLIPIDEMIA: Primary | ICD-10-CM

## 2018-01-22 DIAGNOSIS — F41.9 ANXIETY: ICD-10-CM

## 2018-01-22 DIAGNOSIS — R53.82 CHRONIC FATIGUE: ICD-10-CM

## 2018-01-22 PROCEDURE — 99214 OFFICE O/P EST MOD 30 MIN: CPT | Performed by: INTERNAL MEDICINE

## 2018-01-22 RX ORDER — ATORVASTATIN CALCIUM 10 MG/1
10 TABLET, FILM COATED ORAL DAILY
Qty: 90 TABLET | Refills: 3 | Status: SHIPPED | OUTPATIENT
Start: 2018-01-22 | End: 2019-02-19 | Stop reason: SDDI

## 2018-01-22 NOTE — PROGRESS NOTES
subjective     Chief Complaint   Patient presents with   • Results     labs   • Hyperlipidemia         • Hypertension     History of Present Illness  Patient is 57 years old white female who has history of hyperlipidemia, hypertension and gastroesophageal reflux disorder.  She was supposed be taking Zetia and Crestor for hyperlipidemia however she is not taking it.  She said that she could not tolerate because of aches and had to stop it.    Her blood pressure is very well controlled with current medications.    GI symptoms are under control with Protonix and Zoloft.  Allergen incontinence is also better with the Vesicare.  She also has anxiety disorder B12 deficiency and off and on palpitations.  She sees be doing very well with current medications.      Past Surgical History:   Procedure Laterality Date   • CARDIOVASCULAR STRESS TEST  2017   •  SECTION     • ECHO - CONVERTED  2017   • KIDNEY SURGERY       Family History   Problem Relation Age of Onset   • Hyperlipidemia Mother    • Hypertension Mother    • Heart attack Father 65   • Cancer Father    • Hyperlipidemia Brother    • Hypertension Brother      Past Medical History:   Diagnosis Date   • B12 deficiency    • Fatigue    • GERD (gastroesophageal reflux disease)    • Hyperlipidemia    • Hypertension      Patient Active Problem List   Diagnosis   • Hypertension   • Hyperlipidemia   • B12 deficiency   • GERD (gastroesophageal reflux disease)   • Fatigue   • Anxiety   • Palpitation   • Urgency incontinence       Social History   Substance Use Topics   • Smoking status: Never Smoker   • Smokeless tobacco: Never Used   • Alcohol use No       No Known Allergies    Current Outpatient Prescriptions on File Prior to Visit   Medication Sig   • cholecalciferol (VITAMIN D3) 1000 UNITS tablet Take 1,000 Units by mouth daily.   • cyanocobalamin 1000 MCG/ML injection INJECT 1 MLS AS DIRECTED ONCE A MONTH   • cyanocobalamin 1000 MCG/ML injection INJECT 1  "MLS AS DIRECTED ONCE A MONTH   • lisinopril (PRINIVIL,ZESTRIL) 10 MG tablet TAKE 1 TABLET BY MOUTH DAILY   • ondansetron (ZOFRAN) 4 MG tablet Take 1 tablet by mouth Every 8 (Eight) Hours As Needed for Nausea or Vomiting.   • pantoprazole (PROTONIX) 40 MG EC tablet TAKE 1 TABLET BY MOUTH DAILY   • sertraline (ZOLOFT) 50 MG tablet Take 1 tablet by mouth Daily.   • solifenacin (VESICARE) 10 MG tablet Take 1 tablet by mouth Daily.     No current facility-administered medications on file prior to visit.          The following portions of the patient's history were reviewed and updated as appropriate: allergies, current medications, past family history, past medical history, past social history, past surgical history and problem list.    Review of Systems   Constitution: Negative.   HENT: Negative.  Negative for congestion.    Eyes: Negative.    Cardiovascular: Negative.  Negative for chest pain, cyanosis, dyspnea on exertion, irregular heartbeat, leg swelling, near-syncope, orthopnea, palpitations, paroxysmal nocturnal dyspnea and syncope.   Respiratory: Negative.  Negative for shortness of breath.    Hematologic/Lymphatic: Negative.    Musculoskeletal: Negative.    Gastrointestinal: Negative.    Neurological: Negative.  Negative for headaches.          Objective:     /84 (BP Location: Left arm, Patient Position: Sitting, Cuff Size: Adult)  Pulse 76  Resp 16  Ht 170.2 cm (67\")  Wt 91.6 kg (202 lb)  SpO2 97%  BMI 31.64 kg/m2  Physical Exam   Constitutional: She appears well-developed and well-nourished. No distress.   HENT:   Head: Normocephalic and atraumatic.   Mouth/Throat: Oropharynx is clear and moist. No oropharyngeal exudate.   Eyes: Conjunctivae and EOM are normal. Pupils are equal, round, and reactive to light. No scleral icterus.   Neck: Normal range of motion. Neck supple. No JVD present. No tracheal deviation present. No thyromegaly present.   Cardiovascular: Normal rate, regular rhythm, normal " heart sounds and intact distal pulses.  PMI is not displaced.  Exam reveals no gallop, no friction rub and no decreased pulses.    No murmur heard.  Pulses:       Carotid pulses are 3+ on the right side, and 3+ on the left side.       Radial pulses are 3+ on the right side, and 3+ on the left side.   Pulmonary/Chest: Effort normal and breath sounds normal. No respiratory distress. She has no wheezes. She has no rales. She exhibits no tenderness.   Abdominal: Soft. Bowel sounds are normal. She exhibits no distension, no abdominal bruit and no mass. There is no splenomegaly or hepatomegaly. There is no tenderness. There is no rebound and no guarding.   Musculoskeletal: Normal range of motion. She exhibits no edema, tenderness or deformity.   Lymphadenopathy:     She has no cervical adenopathy.   Neurological: She is alert. She has normal reflexes. No cranial nerve deficit. She exhibits normal muscle tone. Coordination normal.   Skin: Skin is warm and dry. No rash noted. She is not diaphoretic. No erythema.   Psychiatric: She has a normal mood and affect. Her behavior is normal. Judgment and thought content normal.         Lab Review  Lab Results   Component Value Date     11/17/2017    K 4.0 11/17/2017     11/17/2017    BUN 21 11/17/2017    CREATININE 1.05 11/17/2017    GLUCOSE 85 11/17/2017    CALCIUM 9.4 11/17/2017    ALT 20 11/17/2017    ALKPHOS 85 11/17/2017    LABIL2 1.7 11/17/2017     Lab Results   Component Value Date    CKTOTAL 82 11/17/2017     Lab Results   Component Value Date    WBC 10.41 11/17/2017    HGB 13.1 11/17/2017    HCT 39.7 11/17/2017     11/17/2017     No results found for: INR  Lab Results   Component Value Date    MG 2.0 04/17/2014     Lab Results   Component Value Date    TSH 2.620 07/25/2016     No results found for: BNP  Lab Results   Component Value Date    CHLPL 172 02/02/2016    CHOL 196 11/17/2017    TRIG 161 (H) 11/17/2017    HDL 49 (L) 11/17/2017    LDLCALC 115 (H)  11/17/2017    VLDL 32.2 11/17/2017    LDLHDL 2.34 11/17/2017         Procedures       I personally viewed and interpreted the patient's LAB data         Assessment:     1. Mixed hyperlipidemia    2. Essential hypertension    3. Chronic fatigue    4. Anxiety          Plan:      Lipids are not well controlled.  Cholesterol actually is higher now.  It is 196 with LDL of 1:15.  Patient is not taking any medications.  She is willing to try Lipitor.  Lipitor 10 mg daily was started if she can tolerate that we will increase the dose to 20.  Lab work will be checked next visit.  Healthy lifestyle discussed.    Blood pressure seems been very well controlled she was advised to continue current medications.    Anxiety is controlled.  She will continue Zoloft  GI symptoms are better with the Protonix.  She still using Zofran on when necessary basis.  Patient also is taking vitamin D and B12 that will be continued.  Follow-up with lab work scheduled.        Return in about 3 months (around 4/22/2018).

## 2018-02-08 ENCOUNTER — TELEPHONE (OUTPATIENT)
Dept: UROLOGY | Facility: CLINIC | Age: 58
End: 2018-02-08

## 2018-02-08 NOTE — TELEPHONE ENCOUNTER
PA for Vesicare approved from 01/15/2018-01/15/2019    Approval scanned into patient chart. \    Patient and pharmacy called and made aware of this outcome.

## 2018-02-13 DIAGNOSIS — F32.A DEPRESSION, UNSPECIFIED DEPRESSION TYPE: ICD-10-CM

## 2018-02-13 DIAGNOSIS — N39.41 URGENCY INCONTINENCE: ICD-10-CM

## 2018-02-13 RX ORDER — CYANOCOBALAMIN 1000 UG/ML
INJECTION, SOLUTION INTRAMUSCULAR; SUBCUTANEOUS
Qty: 3 ML | Refills: 0 | Status: SHIPPED | OUTPATIENT
Start: 2018-02-13 | End: 2018-03-18 | Stop reason: SDUPTHER

## 2018-02-14 RX ORDER — OXYBUTYNIN CHLORIDE 10 MG/1
TABLET, EXTENDED RELEASE ORAL
Qty: 30 TABLET | Refills: 0 | Status: SHIPPED | OUTPATIENT
Start: 2018-02-14 | End: 2018-04-24

## 2018-03-09 DIAGNOSIS — K21.9 GASTROESOPHAGEAL REFLUX DISEASE WITHOUT ESOPHAGITIS: ICD-10-CM

## 2018-03-09 RX ORDER — ONDANSETRON 4 MG/1
4 TABLET, FILM COATED ORAL EVERY 8 HOURS PRN
Qty: 30 TABLET | Refills: 0 | Status: SHIPPED | OUTPATIENT
Start: 2018-03-09 | End: 2019-02-19 | Stop reason: SDUPTHER

## 2018-03-09 RX ORDER — LORATADINE 10 MG/1
10 CAPSULE, LIQUID FILLED ORAL DAILY
Qty: 90 EACH | Refills: 0 | Status: SHIPPED | OUTPATIENT
Start: 2018-03-09 | End: 2018-04-23 | Stop reason: ALTCHOICE

## 2018-03-18 DIAGNOSIS — I10 ESSENTIAL HYPERTENSION: ICD-10-CM

## 2018-03-19 RX ORDER — LISINOPRIL 20 MG/1
TABLET ORAL
Qty: 90 TABLET | Refills: 0 | Status: SHIPPED | OUTPATIENT
Start: 2018-03-19 | End: 2018-04-23 | Stop reason: SDUPTHER

## 2018-03-19 RX ORDER — CYANOCOBALAMIN 1000 UG/ML
INJECTION, SOLUTION INTRAMUSCULAR; SUBCUTANEOUS
Qty: 3 ML | Refills: 0 | Status: SHIPPED | OUTPATIENT
Start: 2018-03-19 | End: 2018-08-05 | Stop reason: SDUPTHER

## 2018-04-19 DIAGNOSIS — K21.9 GASTROESOPHAGEAL REFLUX DISEASE WITHOUT ESOPHAGITIS: ICD-10-CM

## 2018-04-19 RX ORDER — PANTOPRAZOLE SODIUM 40 MG/1
TABLET, DELAYED RELEASE ORAL
Qty: 90 TABLET | Refills: 0 | Status: SHIPPED | OUTPATIENT
Start: 2018-04-19 | End: 2018-08-05 | Stop reason: SDUPTHER

## 2018-04-22 ENCOUNTER — LAB (OUTPATIENT)
Dept: LAB | Facility: HOSPITAL | Age: 58
End: 2018-04-22
Attending: INTERNAL MEDICINE

## 2018-04-22 DIAGNOSIS — E78.2 MIXED HYPERLIPIDEMIA: ICD-10-CM

## 2018-04-22 LAB
ALBUMIN SERPL-MCNC: 4.1 G/DL (ref 3.5–5)
ALBUMIN/GLOB SERPL: 1.5 G/DL (ref 1.5–2.5)
ALP SERPL-CCNC: 70 U/L (ref 35–104)
ALT SERPL W P-5'-P-CCNC: 18 U/L (ref 10–36)
ANION GAP SERPL CALCULATED.3IONS-SCNC: 4.6 MMOL/L (ref 3.6–11.2)
AST SERPL-CCNC: 18 U/L (ref 10–30)
BASOPHILS # BLD AUTO: 0.03 10*3/MM3 (ref 0–0.3)
BASOPHILS NFR BLD AUTO: 0.5 % (ref 0–2)
BILIRUB SERPL-MCNC: 0.4 MG/DL (ref 0.2–1.8)
BUN BLD-MCNC: 20 MG/DL (ref 7–21)
BUN/CREAT SERPL: 20 (ref 7–25)
CALCIUM SPEC-SCNC: 9.2 MG/DL (ref 7.7–10)
CHLORIDE SERPL-SCNC: 110 MMOL/L (ref 99–112)
CHOLEST SERPL-MCNC: 194 MG/DL (ref 0–200)
CK SERPL-CCNC: 128 U/L (ref 24–173)
CO2 SERPL-SCNC: 27.4 MMOL/L (ref 24.3–31.9)
CREAT BLD-MCNC: 1 MG/DL (ref 0.43–1.29)
DEPRECATED RDW RBC AUTO: 40.5 FL (ref 37–54)
EOSINOPHIL # BLD AUTO: 0.19 10*3/MM3 (ref 0–0.7)
EOSINOPHIL NFR BLD AUTO: 3.2 % (ref 0–5)
ERYTHROCYTE [DISTWIDTH] IN BLOOD BY AUTOMATED COUNT: 12.5 % (ref 11.5–14.5)
GFR SERPL CREATININE-BSD FRML MDRD: 57 ML/MIN/1.73
GLOBULIN UR ELPH-MCNC: 2.8 GM/DL
GLUCOSE BLD-MCNC: 83 MG/DL (ref 70–110)
HCT VFR BLD AUTO: 38.9 % (ref 37–47)
HDLC SERPL-MCNC: 43 MG/DL (ref 60–100)
HGB BLD-MCNC: 12.7 G/DL (ref 12–16)
IMM GRANULOCYTES # BLD: 0.01 10*3/MM3 (ref 0–0.03)
IMM GRANULOCYTES NFR BLD: 0.2 % (ref 0–0.5)
LDLC SERPL CALC-MCNC: 121 MG/DL (ref 0–100)
LDLC/HDLC SERPL: 2.82 {RATIO}
LYMPHOCYTES # BLD AUTO: 2.26 10*3/MM3 (ref 1–3)
LYMPHOCYTES NFR BLD AUTO: 37.7 % (ref 21–51)
MCH RBC QN AUTO: 29.5 PG (ref 27–33)
MCHC RBC AUTO-ENTMCNC: 32.6 G/DL (ref 33–37)
MCV RBC AUTO: 90.3 FL (ref 80–94)
MONOCYTES # BLD AUTO: 0.42 10*3/MM3 (ref 0.1–0.9)
MONOCYTES NFR BLD AUTO: 7 % (ref 0–10)
NEUTROPHILS # BLD AUTO: 3.09 10*3/MM3 (ref 1.4–6.5)
NEUTROPHILS NFR BLD AUTO: 51.4 % (ref 30–70)
OSMOLALITY SERPL CALC.SUM OF ELEC: 284.9 MOSM/KG (ref 273–305)
PLATELET # BLD AUTO: 233 10*3/MM3 (ref 130–400)
PMV BLD AUTO: 10.3 FL (ref 6–10)
POTASSIUM BLD-SCNC: 4.2 MMOL/L (ref 3.5–5.3)
PROT SERPL-MCNC: 6.9 G/DL (ref 6–8)
RBC # BLD AUTO: 4.31 10*6/MM3 (ref 4.2–5.4)
SODIUM BLD-SCNC: 142 MMOL/L (ref 135–153)
TRIGL SERPL-MCNC: 148 MG/DL (ref 0–150)
VLDLC SERPL-MCNC: 29.6 MG/DL
WBC NRBC COR # BLD: 6 10*3/MM3 (ref 4.5–12.5)

## 2018-04-22 PROCEDURE — 82550 ASSAY OF CK (CPK): CPT

## 2018-04-22 PROCEDURE — 85025 COMPLETE CBC W/AUTO DIFF WBC: CPT

## 2018-04-22 PROCEDURE — 80061 LIPID PANEL: CPT

## 2018-04-22 PROCEDURE — 80053 COMPREHEN METABOLIC PANEL: CPT

## 2018-04-23 ENCOUNTER — OFFICE VISIT (OUTPATIENT)
Dept: CARDIOLOGY | Facility: CLINIC | Age: 58
End: 2018-04-23

## 2018-04-23 VITALS
HEIGHT: 67 IN | BODY MASS INDEX: 32.02 KG/M2 | OXYGEN SATURATION: 98 % | HEART RATE: 81 BPM | DIASTOLIC BLOOD PRESSURE: 74 MMHG | SYSTOLIC BLOOD PRESSURE: 132 MMHG | WEIGHT: 204 LBS

## 2018-04-23 DIAGNOSIS — I10 ESSENTIAL HYPERTENSION: ICD-10-CM

## 2018-04-23 DIAGNOSIS — E78.2 MIXED HYPERLIPIDEMIA: Primary | ICD-10-CM

## 2018-04-23 DIAGNOSIS — F41.9 ANXIETY: ICD-10-CM

## 2018-04-23 DIAGNOSIS — R00.2 PALPITATION: ICD-10-CM

## 2018-04-23 PROCEDURE — 99213 OFFICE O/P EST LOW 20 MIN: CPT | Performed by: INTERNAL MEDICINE

## 2018-04-23 RX ORDER — LISINOPRIL 20 MG/1
20 TABLET ORAL DAILY
Qty: 90 TABLET | Refills: 2 | Status: SHIPPED | OUTPATIENT
Start: 2018-04-23 | End: 2019-02-19 | Stop reason: SDUPTHER

## 2018-04-23 NOTE — PROGRESS NOTES
subjective     Chief Complaint   Patient presents with   • Hypertension   • Hyperlipidemia   • Heartburn   • Follow-up   • Results     Labs 18     History of Present Illness  Patient is 58 years old white female who has had lab work done recently.  Patient is here for follow-up and to discuss the lab results.  Patient is overweight and is trying to lose weight.  She has hypertension which is very well controlled with current medication.  She also has hyperlipidemia lab will be reviewed.  She is taking Lipitor.    Past Surgical History:   Procedure Laterality Date   • CARDIOVASCULAR STRESS TEST  2017   •  SECTION     • ECHO - CONVERTED  2017   • KIDNEY SURGERY       Family History   Problem Relation Age of Onset   • Hyperlipidemia Mother    • Hypertension Mother    • Heart attack Father 65   • Cancer Father    • Hyperlipidemia Brother    • Hypertension Brother      Past Medical History:   Diagnosis Date   • B12 deficiency    • Fatigue    • GERD (gastroesophageal reflux disease)    • Hyperlipidemia    • Hypertension      Patient Active Problem List   Diagnosis   • Hypertension   • Hyperlipidemia   • B12 deficiency   • GERD (gastroesophageal reflux disease)   • Fatigue   • Anxiety   • Palpitation   • Urgency incontinence       Social History   Substance Use Topics   • Smoking status: Never Smoker   • Smokeless tobacco: Never Used   • Alcohol use No       No Known Allergies    Current Outpatient Prescriptions on File Prior to Visit   Medication Sig   • atorvastatin (LIPITOR) 10 MG tablet Take 1 tablet by mouth Daily.   • cholecalciferol (VITAMIN D3) 1000 UNITS tablet Take 1,000 Units by mouth daily.   • cyanocobalamin 1000 MCG/ML injection INJECT AS DIRECTED EVERY MONTH.   • lisinopril (PRINIVIL,ZESTRIL) 20 MG tablet TAKE 1/2 TABLET BY MOUTH TWICE DAILY   • ondansetron (ZOFRAN) 4 MG tablet Take 1 tablet by mouth Every 8 (Eight) Hours As Needed for Nausea or Vomiting.   • oxybutynin XL  "(DITROPAN-XL) 10 MG 24 hr tablet TAKE 1 TABLET BY MOUTH DAILY   • pantoprazole (PROTONIX) 40 MG EC tablet TAKE 1 TABLET BY MOUTH DAILY   • sertraline (ZOLOFT) 50 MG tablet TAKE 1 TABLET BY MOUTH DAILY   • solifenacin (VESICARE) 10 MG tablet Take 1 tablet by mouth Daily.   • [DISCONTINUED] cyanocobalamin 1000 MCG/ML injection INJECT 1 MLS AS DIRECTED ONCE A MONTH   • [DISCONTINUED] cyanocobalamin 1000 MCG/ML injection INJECT 1 MLS AS DIRECTED ONCE A MONTH   • [DISCONTINUED] lisinopril (PRINIVIL,ZESTRIL) 10 MG tablet TAKE 1 TABLET BY MOUTH DAILY   • [DISCONTINUED] Loratadine 10 MG capsule Take 10 mg by mouth Daily.     No current facility-administered medications on file prior to visit.          The following portions of the patient's history were reviewed and updated as appropriate: allergies, current medications, past family history, past medical history, past social history, past surgical history and problem list.    Review of Systems   Constitution: Negative.   HENT: Negative.  Negative for congestion.    Eyes: Negative.    Cardiovascular: Negative.  Negative for chest pain, cyanosis, dyspnea on exertion, irregular heartbeat, leg swelling, near-syncope, orthopnea, palpitations, paroxysmal nocturnal dyspnea and syncope.   Respiratory: Negative.  Negative for shortness of breath.    Hematologic/Lymphatic: Negative.    Musculoskeletal: Negative.    Gastrointestinal: Positive for heartburn.   Neurological: Negative.  Negative for headaches.          Objective:     /74 (BP Location: Left arm, Patient Position: Sitting)   Pulse 81   Ht 170.2 cm (67\")   Wt 92.5 kg (204 lb)   SpO2 98%   BMI 31.95 kg/m²   Physical Exam   Constitutional: She appears well-developed and well-nourished. No distress.   HENT:   Head: Normocephalic and atraumatic.   Mouth/Throat: Oropharynx is clear and moist. No oropharyngeal exudate.   Eyes: Conjunctivae and EOM are normal. Pupils are equal, round, and reactive to light. No scleral " icterus.   Neck: Normal range of motion. Neck supple. No JVD present. No tracheal deviation present. No thyromegaly present.   Cardiovascular: Normal rate, regular rhythm, normal heart sounds and intact distal pulses.  PMI is not displaced.  Exam reveals no gallop, no friction rub and no decreased pulses.    No murmur heard.  Pulses:       Carotid pulses are 3+ on the right side, and 3+ on the left side.       Radial pulses are 3+ on the right side, and 3+ on the left side.   Pulmonary/Chest: Effort normal and breath sounds normal. No respiratory distress. She has no wheezes. She has no rales. She exhibits no tenderness.   Abdominal: Soft. Bowel sounds are normal. She exhibits no distension, no abdominal bruit and no mass. There is no splenomegaly or hepatomegaly. There is no tenderness. There is no rebound and no guarding.   Musculoskeletal: Normal range of motion. She exhibits no edema, tenderness or deformity.   Lymphadenopathy:     She has no cervical adenopathy.   Neurological: She is alert. She has normal reflexes. No cranial nerve deficit. She exhibits normal muscle tone. Coordination normal.   Skin: Skin is warm and dry. No rash noted. She is not diaphoretic. No erythema.   Psychiatric: She has a normal mood and affect. Her behavior is normal. Judgment and thought content normal.         Lab Review  Lab Results   Component Value Date     04/22/2018    K 4.2 04/22/2018     04/22/2018    BUN 20 04/22/2018    CREATININE 1.00 04/22/2018    GLUCOSE 83 04/22/2018    CALCIUM 9.2 04/22/2018    ALT 18 04/22/2018    ALKPHOS 70 04/22/2018    LABIL2 1.5 04/22/2018     Lab Results   Component Value Date    CKTOTAL 128 04/22/2018     Lab Results   Component Value Date    WBC 6.00 04/22/2018    HGB 12.7 04/22/2018    HCT 38.9 04/22/2018     04/22/2018     No results found for: INR  Lab Results   Component Value Date    MG 2.0 04/17/2014     Lab Results   Component Value Date    TSH 2.620 07/25/2016      No results found for: BNP  Lab Results   Component Value Date    CHLPL 172 02/02/2016    CHOL 194 04/22/2018    TRIG 148 04/22/2018    HDL 43 (L) 04/22/2018    VLDL 29.6 04/22/2018    LDLHDL 2.82 04/22/2018     Lab Results   Component Value Date     (H) 04/22/2018     (H) 11/17/2017       Procedures       I personally viewed and interpreted the patient's LAB data         Assessment:     1. Mixed hyperlipidemia    2. Essential hypertension    3. Palpitation    4. Anxiety          Plan:      labs reviewed.  CBC CMP is normal.  Lipid panel is abnormal with LDL of 121.  Patient has not been taking Lipitor regularly.  She was advised to take Lipitor regularly.  Healthy lifestyle was emphasized exercise program and diet discussed.  Weight loss was stressed.  Blood pressure is very well controlled.  She will continue medication.  Refills were given no change in therapy      No Follow-up on file.

## 2018-04-24 ENCOUNTER — OFFICE VISIT (OUTPATIENT)
Dept: UROLOGY | Facility: CLINIC | Age: 58
End: 2018-04-24

## 2018-04-24 VITALS — WEIGHT: 204 LBS | HEIGHT: 67 IN | BODY MASS INDEX: 32.02 KG/M2

## 2018-04-24 DIAGNOSIS — R10.9 FLANK PAIN: ICD-10-CM

## 2018-04-24 DIAGNOSIS — R35.0 FREQUENCY OF URINATION: Primary | ICD-10-CM

## 2018-04-24 DIAGNOSIS — N20.0 KIDNEY STONE: ICD-10-CM

## 2018-04-24 DIAGNOSIS — N39.41 URGENCY INCONTINENCE: ICD-10-CM

## 2018-04-24 LAB
BILIRUB BLD-MCNC: NEGATIVE MG/DL
CLARITY, POC: CLEAR
COLOR UR: YELLOW
GLUCOSE UR STRIP-MCNC: NEGATIVE MG/DL
KETONES UR QL: NEGATIVE
LEUKOCYTE EST, POC: NEGATIVE
NITRITE UR-MCNC: NEGATIVE MG/ML
PH UR: 6 [PH] (ref 5–8)
PROT UR STRIP-MCNC: NEGATIVE MG/DL
RBC # UR STRIP: NEGATIVE /UL
SP GR UR: 1 (ref 1–1.03)
UROBILINOGEN UR QL: NORMAL

## 2018-04-24 PROCEDURE — 99214 OFFICE O/P EST MOD 30 MIN: CPT | Performed by: UROLOGY

## 2018-04-24 RX ORDER — SOLIFENACIN SUCCINATE 10 MG/1
10 TABLET, FILM COATED ORAL DAILY
Qty: 30 TABLET | Refills: 11 | Status: SHIPPED | OUTPATIENT
Start: 2018-04-24 | End: 2019-04-24

## 2018-04-24 NOTE — PROGRESS NOTES
Chief Complaint:          Frequency.    HPI:   57 y.o. female.  Pt has improved with vesicare her nocturia has decreased for 4 to once or twice.  Pt has left flank pain that has gotten worse the past 3 or 4 months.  Pt has had 2 open kidney surgeries for stone in the 70's.  She has had laser surgery as well.  HPI      Past Medical History:        Past Medical History:   Diagnosis Date   • B12 deficiency    • Fatigue    • GERD (gastroesophageal reflux disease)    • Hyperlipidemia    • Hypertension          Current Meds:     Current Outpatient Prescriptions   Medication Sig Dispense Refill   • atorvastatin (LIPITOR) 10 MG tablet Take 1 tablet by mouth Daily. 90 tablet 3   • cholecalciferol (VITAMIN D3) 1000 UNITS tablet Take 1,000 Units by mouth daily.     • cyanocobalamin 1000 MCG/ML injection INJECT AS DIRECTED EVERY MONTH. 3 mL 0   • lisinopril (PRINIVIL,ZESTRIL) 20 MG tablet Take 1 tablet by mouth Daily. 90 tablet 2   • ondansetron (ZOFRAN) 4 MG tablet Take 1 tablet by mouth Every 8 (Eight) Hours As Needed for Nausea or Vomiting. 30 tablet 0   • oxybutynin XL (DITROPAN-XL) 10 MG 24 hr tablet TAKE 1 TABLET BY MOUTH DAILY 30 tablet 0   • pantoprazole (PROTONIX) 40 MG EC tablet TAKE 1 TABLET BY MOUTH DAILY 90 tablet 0   • sertraline (ZOLOFT) 50 MG tablet TAKE 1 TABLET BY MOUTH DAILY 90 tablet 0   • solifenacin (VESICARE) 10 MG tablet Take 1 tablet by mouth Daily. 30 tablet 11     No current facility-administered medications for this visit.         Allergies:      No Known Allergies     Past Surgical History:     Past Surgical History:   Procedure Laterality Date   • CARDIOVASCULAR STRESS TEST  2017   •  SECTION     • ECHO - CONVERTED  2017   • KIDNEY SURGERY           Social History:     Social History     Social History   • Marital status:      Spouse name: N/A   • Number of children: N/A   • Years of education: N/A     Occupational History   • Not on file.     Social History Main Topics    • Smoking status: Never Smoker   • Smokeless tobacco: Never Used   • Alcohol use No   • Drug use: No   • Sexual activity: Defer     Other Topics Concern   • Not on file     Social History Narrative   • No narrative on file       Family History:     Family History   Problem Relation Age of Onset   • Hyperlipidemia Mother    • Hypertension Mother    • Heart attack Father 65   • Cancer Father    • Hyperlipidemia Brother    • Hypertension Brother        Review of Systems:     Review of Systems   Constitutional: Positive for fatigue.   HENT: Negative for sinus pain.    Eyes: Negative for pain.   Respiratory: Negative for chest tightness.    Cardiovascular: Negative for chest pain.   Gastrointestinal: Negative for abdominal pain and constipation.   Endocrine: Negative for heat intolerance.   Genitourinary: Positive for flank pain, frequency and urgency.   Musculoskeletal: Positive for back pain.   Skin: Negative for rash.   Allergic/Immunologic: Negative for food allergies.   Neurological: Negative for headaches.   Hematological: Does not bruise/bleed easily.   Psychiatric/Behavioral: The patient is not nervous/anxious.        IPSS Questionnaire (AUA-7):  Over the past month…    1)  How often have you had a sensation of not emptying your bladder completely after you finish urinating?  1 - Less than 1 time in 5   2)  How often have you had to urinate again less than two hours after you finished urinating? 1 - Less than 1 time in 5   3)  How often have you found you stopped and started again several times when you urinated?  1 - Less than 1 time in 5   4) How difficult have you found it to postpone urination?  4 - More than half the time   5) How often have you had a weak urinary stream?  2 - Less than half the time   6) How often have you had to push or strain to begin urination?  0 - Not at all   7) How many times did you most typically get up to urinate from the time you went to bed until the time you got up in the  "morning?  5 - 5+ times   Total Score:  14     I have reviewed the follow portions of the patient's history and confirmed they are accurate today:  allergies, current medications, past family history, past medical history, past social history, past surgical history, problem list and ROS  Physical Exam:     Physical Exam   Constitutional: She is oriented to person, place, and time. She appears well-developed.   HENT:   Head: Normocephalic.   Right Ear: External ear normal.   Left Ear: External ear normal.   Nose: Nose normal.   Mouth/Throat: Oropharynx is clear and moist.   Eyes: Conjunctivae and EOM are normal. Pupils are equal, round, and reactive to light.   Neck: Normal range of motion. Neck supple. No tracheal deviation present. No thyromegaly present.   Cardiovascular: Normal heart sounds.  Exam reveals no gallop and no friction rub.    No murmur heard.  Pulmonary/Chest: Effort normal and breath sounds normal. No respiratory distress. She has no wheezes. She has no rales. She exhibits no tenderness.   Abdominal: Soft. Bowel sounds are normal. She exhibits no distension and no mass. There is no tenderness. There is no rebound and no guarding. No hernia.   Genitourinary: Vagina normal and uterus normal.   Musculoskeletal: Normal range of motion. She exhibits no edema.   Lymphadenopathy:     She has no cervical adenopathy.   Neurological: She is alert and oriented to person, place, and time. She displays normal reflexes. No cranial nerve deficit. She exhibits normal muscle tone. Coordination normal.   Skin: Skin is warm. No rash noted.   Psychiatric: She has a normal mood and affect. Judgment normal.       Ht 170.2 cm (67\")   Wt 92.5 kg (204 lb)   BMI 31.95 kg/m²    Procedure:         Assessment:     Encounter Diagnoses   Name Primary?   • Frequency of urination Yes   • Kidney stone    • Flank pain      Orders Placed This Encounter   Procedures   • POC Urinalysis Dipstick       Plan:   Will order a stone study ct " scan to see if she has stones and or hydronephrosis. Will refill her vesicare.     Patient's Body mass index is 31.95 kg/m². BMI is above normal parameters. Follow-up plan includes:  educational material.      Counseling was given to patient for the following topics instructions for management as follows: urgency and frequency. The interim medical history and current results were reviewed.  A treatment plan with follow-up was made for Frequency of urination [R35.0].  This document has been electronically signed by Viraj Benito MD April 24, 2018 1:45 PM

## 2018-05-17 ENCOUNTER — HOSPITAL ENCOUNTER (OUTPATIENT)
Dept: CT IMAGING | Facility: HOSPITAL | Age: 58
Discharge: HOME | End: 2018-05-17
Attending: UROLOGY | Admitting: UROLOGY

## 2018-05-17 DIAGNOSIS — N20.0 KIDNEY STONE: ICD-10-CM

## 2018-05-17 PROCEDURE — 74176 CT ABD & PELVIS W/O CONTRAST: CPT | Performed by: RADIOLOGY

## 2018-05-17 PROCEDURE — 74176 CT ABD & PELVIS W/O CONTRAST: CPT

## 2018-05-22 ENCOUNTER — OFFICE VISIT (OUTPATIENT)
Dept: UROLOGY | Facility: CLINIC | Age: 58
End: 2018-05-22

## 2018-05-22 VITALS — HEIGHT: 67 IN | BODY MASS INDEX: 32.02 KG/M2 | WEIGHT: 204 LBS

## 2018-05-22 DIAGNOSIS — N39.0 URINARY TRACT INFECTION WITHOUT HEMATURIA, SITE UNSPECIFIED: Primary | ICD-10-CM

## 2018-05-22 DIAGNOSIS — R10.9 LT FLANK PAIN: ICD-10-CM

## 2018-05-22 LAB
BILIRUB BLD-MCNC: NEGATIVE MG/DL
CLARITY, POC: CLEAR
COLOR UR: YELLOW
GLUCOSE UR STRIP-MCNC: NEGATIVE MG/DL
KETONES UR QL: NEGATIVE
LEUKOCYTE EST, POC: NEGATIVE
NITRITE UR-MCNC: NEGATIVE MG/ML
PH UR: 6 [PH] (ref 5–8)
PROT UR STRIP-MCNC: NEGATIVE MG/DL
RBC # UR STRIP: NEGATIVE /UL
SP GR UR: 1.02 (ref 1–1.03)
UROBILINOGEN UR QL: NORMAL

## 2018-05-22 PROCEDURE — 99214 OFFICE O/P EST MOD 30 MIN: CPT | Performed by: UROLOGY

## 2018-05-22 NOTE — PROGRESS NOTES
Chief Complaint:          Left flank pain    HPI:   58 y.o. female.  Pt has left flank pain on and off and it wakes her up at night and getting up and walking the pain improves.  Pt describes the pain as an ache 5/10 scale of pain severity.  Pt has had 2 major kidney operations that included open stone removal on the right kidney in  and Left partial nephrectomy.    HPI  Pt's ct scan shows nephrocalcinosis and a partial nephrectomy    Past Medical History:        Past Medical History:   Diagnosis Date   • B12 deficiency    • Fatigue    • GERD (gastroesophageal reflux disease)    • Hyperlipidemia    • Hypertension          Current Meds:     Current Outpatient Prescriptions   Medication Sig Dispense Refill   • atorvastatin (LIPITOR) 10 MG tablet Take 1 tablet by mouth Daily. 90 tablet 3   • cholecalciferol (VITAMIN D3) 1000 UNITS tablet Take 1,000 Units by mouth daily.     • cyanocobalamin 1000 MCG/ML injection INJECT AS DIRECTED EVERY MONTH. 3 mL 0   • lisinopril (PRINIVIL,ZESTRIL) 20 MG tablet Take 1 tablet by mouth Daily. 90 tablet 2   • ondansetron (ZOFRAN) 4 MG tablet Take 1 tablet by mouth Every 8 (Eight) Hours As Needed for Nausea or Vomiting. 30 tablet 0   • pantoprazole (PROTONIX) 40 MG EC tablet TAKE 1 TABLET BY MOUTH DAILY 90 tablet 0   • sertraline (ZOLOFT) 50 MG tablet TAKE 1 TABLET BY MOUTH DAILY 90 tablet 0   • solifenacin (VESICARE) 10 MG tablet Take 1 tablet by mouth Daily. 30 tablet 11     No current facility-administered medications for this visit.         Allergies:      No Known Allergies     Past Surgical History:     Past Surgical History:   Procedure Laterality Date   • CARDIOVASCULAR STRESS TEST  2017   •  SECTION     • ECHO - CONVERTED  2017   • KIDNEY SURGERY           Social History:     Social History     Social History   • Marital status:      Spouse name: N/A   • Number of children: N/A   • Years of education: N/A     Occupational History   • Not on file.      Social History Main Topics   • Smoking status: Never Smoker   • Smokeless tobacco: Never Used   • Alcohol use No   • Drug use: No   • Sexual activity: Defer     Other Topics Concern   • Not on file     Social History Narrative   • No narrative on file       Family History:     Family History   Problem Relation Age of Onset   • Hyperlipidemia Mother    • Hypertension Mother    • Heart attack Father 65   • Cancer Father    • Hyperlipidemia Brother    • Hypertension Brother        Review of Systems:     Review of Systems   Constitutional: Positive for fatigue.   HENT: Negative for sinus pain.    Eyes: Negative for pain.   Respiratory: Negative for chest tightness.    Cardiovascular: Negative for chest pain.   Gastrointestinal: Positive for abdominal pain. Negative for constipation.   Endocrine: Negative for heat intolerance.   Genitourinary: Positive for frequency.   Musculoskeletal: Negative for back pain.   Skin: Negative for rash.   Allergic/Immunologic: Negative for food allergies.   Neurological: Negative for headaches.   Hematological: Does not bruise/bleed easily.           I have reviewed the follow portions of the patient's history and confirmed they are accurate today:  allergies, current medications, past family history, past medical history, past social history, past surgical history, problem list and ROS  Physical Exam:     Physical Exam   Constitutional: She is oriented to person, place, and time. She appears well-developed.   HENT:   Head: Normocephalic.   Right Ear: External ear normal.   Left Ear: External ear normal.   Nose: Nose normal.   Mouth/Throat: Oropharynx is clear and moist.   Eyes: Conjunctivae and EOM are normal. Pupils are equal, round, and reactive to light.   Neck: Normal range of motion. Neck supple. No tracheal deviation present. No thyromegaly present.   Cardiovascular: Normal heart sounds.  Exam reveals no gallop and no friction rub.    No murmur heard.  Pulmonary/Chest: Effort  "normal and breath sounds normal. No respiratory distress. She has no wheezes. She has no rales. She exhibits no tenderness.   Abdominal: Soft. Bowel sounds are normal. She exhibits no distension and no mass. There is no tenderness. There is no rebound and no guarding. No hernia.   Genitourinary: Vagina normal.   Musculoskeletal: Normal range of motion. She exhibits no edema.   Lymphadenopathy:     She has no cervical adenopathy.   Neurological: She is alert and oriented to person, place, and time. She displays normal reflexes. No cranial nerve deficit. She exhibits normal muscle tone. Coordination normal.   Skin: Skin is warm. No rash noted.   Psychiatric: She has a normal mood and affect. Judgment normal.   Nursing note reviewed.      Ht 170.2 cm (67\")   Wt 92.5 kg (204 lb)   BMI 31.95 kg/m²    Procedure:         Assessment:     Encounter Diagnoses   Name Primary?   • Urinary tract infection without hematuria, site unspecified Yes   • Lt flank pain      Orders Placed This Encounter   Procedures   • POC Urinalysis Dipstick       Plan:   I do not think the kidney on the left is the source of her pain.  Will see pt back in a month from now and repeat her ua then    Patient's Body mass index is 31.95 kg/m². BMI is above normal parameters. Recommendations include: educational material.      Counseling was given to patient and family for the following topics diagnostic results including: ct scan results. The interim medical history and current results were reviewed.  A treatment plan with follow-up was made for Urinary tract infection without hematuria, site unspecified [N39.0].This document has been electronically signed by Viraj Benito MD May 22, 2018 3:44 PM  "

## 2018-05-23 ENCOUNTER — TELEPHONE (OUTPATIENT)
Dept: CARDIOLOGY | Facility: CLINIC | Age: 58
End: 2018-05-23

## 2018-05-24 ENCOUNTER — OFFICE VISIT (OUTPATIENT)
Dept: CARDIOLOGY | Facility: CLINIC | Age: 58
End: 2018-05-24

## 2018-05-24 VITALS
HEART RATE: 89 BPM | WEIGHT: 205 LBS | SYSTOLIC BLOOD PRESSURE: 138 MMHG | OXYGEN SATURATION: 98 % | HEIGHT: 67 IN | BODY MASS INDEX: 32.18 KG/M2 | DIASTOLIC BLOOD PRESSURE: 76 MMHG

## 2018-05-24 DIAGNOSIS — R00.2 PALPITATION: ICD-10-CM

## 2018-05-24 DIAGNOSIS — I10 ESSENTIAL HYPERTENSION: ICD-10-CM

## 2018-05-24 DIAGNOSIS — I77.6 VASCULITIS (HCC): ICD-10-CM

## 2018-05-24 DIAGNOSIS — E78.2 MIXED HYPERLIPIDEMIA: ICD-10-CM

## 2018-05-24 DIAGNOSIS — F32.A DEPRESSION, UNSPECIFIED DEPRESSION TYPE: ICD-10-CM

## 2018-05-24 DIAGNOSIS — R21 SKIN RASH: Primary | ICD-10-CM

## 2018-05-24 PROCEDURE — 99213 OFFICE O/P EST LOW 20 MIN: CPT | Performed by: INTERNAL MEDICINE

## 2018-05-27 PROBLEM — R21 SKIN RASH: Status: ACTIVE | Noted: 2018-05-27

## 2018-05-27 NOTE — PROGRESS NOTES
subjective     Chief Complaint   Patient presents with   • Rash   • Circulatory Problem     Patient was seen at Urgent Care wants  to reivew it     History of Present Illness  Patient is 58 years old white female who states that she mowed grass all day  next day legs and had petechiae below the knee.  She went to see nurse practitioner and was told that she has vasculitis.  No tests were done.  Patient was given a steroid shot and was told to make an appointment with Dr. Salmon.  Patient states that legs are significantly better there is still some mild rash remaining.  Patient is here for follow-up and to get referral for Dr. Salmon.    It was explained to the patient that Dr. salmon is not a rheumatologist and is not a dermatologist  He is a cardiologist.    Past Surgical History:   Procedure Laterality Date   • CARDIOVASCULAR STRESS TEST  2017   •  SECTION     • ECHO - CONVERTED  2017   • KIDNEY SURGERY       Family History   Problem Relation Age of Onset   • Hyperlipidemia Mother    • Hypertension Mother    • Heart attack Father 65   • Cancer Father    • Hyperlipidemia Brother    • Hypertension Brother      Past Medical History:   Diagnosis Date   • B12 deficiency    • Fatigue    • GERD (gastroesophageal reflux disease)    • Hyperlipidemia    • Hypertension      Patient Active Problem List   Diagnosis   • Hypertension   • Hyperlipidemia   • B12 deficiency   • GERD (gastroesophageal reflux disease)   • Fatigue   • Anxiety   • Palpitation   • Urgency incontinence   • Skin rash       Social History   Substance Use Topics   • Smoking status: Never Smoker   • Smokeless tobacco: Never Used   • Alcohol use No       No Known Allergies    Current Outpatient Prescriptions on File Prior to Visit   Medication Sig   • atorvastatin (LIPITOR) 10 MG tablet Take 1 tablet by mouth Daily.   • cholecalciferol (VITAMIN D3) 1000 UNITS tablet Take 1,000 Units by mouth daily.   • cyanocobalamin 1000  "MCG/ML injection INJECT AS DIRECTED EVERY MONTH.   • lisinopril (PRINIVIL,ZESTRIL) 20 MG tablet Take 1 tablet by mouth Daily.   • ondansetron (ZOFRAN) 4 MG tablet Take 1 tablet by mouth Every 8 (Eight) Hours As Needed for Nausea or Vomiting.   • pantoprazole (PROTONIX) 40 MG EC tablet TAKE 1 TABLET BY MOUTH DAILY   • solifenacin (VESICARE) 10 MG tablet Take 1 tablet by mouth Daily.     No current facility-administered medications on file prior to visit.          The following portions of the patient's history were reviewed and updated as appropriate: allergies, current medications, past family history, past medical history, past social history, past surgical history and problem list.    Review of Systems   Constitution: Negative.   HENT: Negative.  Negative for congestion.    Eyes: Negative.    Cardiovascular: Negative.  Negative for chest pain, cyanosis, dyspnea on exertion, irregular heartbeat, leg swelling, near-syncope, orthopnea, palpitations, paroxysmal nocturnal dyspnea and syncope.   Respiratory: Negative.  Negative for shortness of breath.    Hematologic/Lymphatic: Negative.    Skin: Positive for color change and rash.   Musculoskeletal: Negative.    Gastrointestinal: Negative.    Neurological: Negative.  Negative for headaches.          Objective:     /76 (BP Location: Left arm, Patient Position: Sitting)   Pulse 89   Ht 170.2 cm (67\")   Wt 93 kg (205 lb)   SpO2 98%   BMI 32.11 kg/m²   Physical Exam   Skin: Rash noted.   Patient has blotchy skin rash on both lower extremities worse on the left         Lab Review  Lab Results   Component Value Date     04/22/2018    K 4.2 04/22/2018     04/22/2018    BUN 20 04/22/2018    CREATININE 1.00 04/22/2018    GLUCOSE 83 04/22/2018    CALCIUM 9.2 04/22/2018    ALT 18 04/22/2018    ALKPHOS 70 04/22/2018    LABIL2 1.5 04/22/2018     Lab Results   Component Value Date    CKTOTAL 128 04/22/2018     Lab Results   Component Value Date    WBC 6.00 " 04/22/2018    HGB 12.7 04/22/2018    HCT 38.9 04/22/2018     04/22/2018     No results found for: INR  Lab Results   Component Value Date    MG 2.0 04/17/2014     Lab Results   Component Value Date    TSH 2.620 07/25/2016     No results found for: BNP  Lab Results   Component Value Date    CHLPL 172 02/02/2016    CHOL 194 04/22/2018    TRIG 148 04/22/2018    HDL 43 (L) 04/22/2018    VLDL 29.6 04/22/2018    LDLHDL 2.82 04/22/2018     Lab Results   Component Value Date     (H) 04/22/2018     (H) 11/17/2017       Procedures       I personally viewed and interpreted the patient's LAB data         Assessment:     1. Skin rash    2. Mixed hyperlipidemia    3. Essential hypertension    4. Palpitation    5. Vasculitis          Plan:      Patient mowed grass all day long Sunday and Monday developed a rash in both lower extremities below the knee.  It was red blotchy rash.  She was told there is vasculitis.  No testing were done.    Patient wanted to be referred to Dr. esquivel who is a cardiologist.    Patient has no PAD.  She does not need to see a cardiologist to get arterial Doppler.    If there is a suspicion for vasculitis she will need to be seen by rheumatologist or a dermatologist.  It seems to be a localized problem limited to the skin.  Will get dermatology consult and may need a biopsy.  Extensive lab work for vasculitis were discussed with the patient.  None was done because patient wants to discuss that with dermatologist first.    Appointment was made          No Follow-up on file.

## 2018-06-19 ENCOUNTER — OFFICE VISIT (OUTPATIENT)
Dept: UROLOGY | Facility: CLINIC | Age: 58
End: 2018-06-19

## 2018-06-19 VITALS — WEIGHT: 205 LBS | HEIGHT: 67 IN | BODY MASS INDEX: 32.18 KG/M2

## 2018-06-19 DIAGNOSIS — N39.0 RECURRENT UTI: ICD-10-CM

## 2018-06-19 DIAGNOSIS — N20.0 KIDNEY STONE: ICD-10-CM

## 2018-06-19 DIAGNOSIS — N20.0 KIDNEY STONES: Primary | ICD-10-CM

## 2018-06-19 LAB
BILIRUB BLD-MCNC: NEGATIVE MG/DL
CLARITY, POC: CLEAR
COLOR UR: YELLOW
GLUCOSE UR STRIP-MCNC: NEGATIVE MG/DL
KETONES UR QL: NEGATIVE
LEUKOCYTE EST, POC: ABNORMAL
NITRITE UR-MCNC: NEGATIVE MG/ML
PH UR: 6 [PH] (ref 5–8)
PROT UR STRIP-MCNC: NEGATIVE MG/DL
RBC # UR STRIP: NEGATIVE /UL
SP GR UR: 1 (ref 1–1.03)
UROBILINOGEN UR QL: NORMAL

## 2018-06-19 PROCEDURE — 87077 CULTURE AEROBIC IDENTIFY: CPT | Performed by: UROLOGY

## 2018-06-19 PROCEDURE — 87086 URINE CULTURE/COLONY COUNT: CPT | Performed by: UROLOGY

## 2018-06-19 PROCEDURE — 99214 OFFICE O/P EST MOD 30 MIN: CPT | Performed by: UROLOGY

## 2018-06-19 PROCEDURE — 87186 SC STD MICRODIL/AGAR DIL: CPT | Performed by: UROLOGY

## 2018-06-19 NOTE — PROGRESS NOTES
Chief Complaint:          Left flank pain    HPI:   58 y.o. female.  Her pain has improved but when she mows the grass or weed eats her pain is worse.  She had non obstructing stones that were small on her ct scan ter is a larger calcification that does not look like it is within the ureter.. Her ua has trace leukocyte esterase with negative nitrites.  We have never operated on her stones.  HPI      Past Medical History:        Past Medical History:   Diagnosis Date   • B12 deficiency    • Fatigue    • GERD (gastroesophageal reflux disease)    • Hyperlipidemia    • Hypertension          Current Meds:     Current Outpatient Prescriptions   Medication Sig Dispense Refill   • atorvastatin (LIPITOR) 10 MG tablet Take 1 tablet by mouth Daily. 90 tablet 3   • cholecalciferol (VITAMIN D3) 1000 UNITS tablet Take 1,000 Units by mouth daily.     • cyanocobalamin 1000 MCG/ML injection INJECT AS DIRECTED EVERY MONTH. 3 mL 0   • lisinopril (PRINIVIL,ZESTRIL) 20 MG tablet Take 1 tablet by mouth Daily. 90 tablet 2   • ondansetron (ZOFRAN) 4 MG tablet Take 1 tablet by mouth Every 8 (Eight) Hours As Needed for Nausea or Vomiting. 30 tablet 0   • pantoprazole (PROTONIX) 40 MG EC tablet TAKE 1 TABLET BY MOUTH DAILY 90 tablet 0   • sertraline (ZOLOFT) 50 MG tablet TAKE 1 TABLET BY MOUTH DAILY 90 tablet 0   • solifenacin (VESICARE) 10 MG tablet Take 1 tablet by mouth Daily. 30 tablet 11     No current facility-administered medications for this visit.         Allergies:      No Known Allergies     Past Surgical History:     Past Surgical History:   Procedure Laterality Date   • CARDIOVASCULAR STRESS TEST  2017   •  SECTION     • ECHO - CONVERTED  2017   • KIDNEY SURGERY           Social History:     Social History     Social History   • Marital status:      Spouse name: N/A   • Number of children: N/A   • Years of education: N/A     Occupational History   • Not on file.     Social History Main Topics   •  "Smoking status: Never Smoker   • Smokeless tobacco: Never Used   • Alcohol use No   • Drug use: No   • Sexual activity: Defer     Other Topics Concern   • Not on file     Social History Narrative   • No narrative on file       Family History:     Family History   Problem Relation Age of Onset   • Hyperlipidemia Mother    • Hypertension Mother    • Heart attack Father 65   • Cancer Father    • Hyperlipidemia Brother    • Hypertension Brother        Review of Systems:     Review of Systems   Constitutional: Negative for fatigue.   HENT: Negative for sinus pain.    Eyes: Negative for pain.   Respiratory: Negative for chest tightness.    Cardiovascular: Negative for chest pain.   Gastrointestinal: Negative for abdominal pain and constipation.   Endocrine: Negative for heat intolerance.   Genitourinary: Positive for frequency and urgency.   Musculoskeletal: Negative for back pain.   Skin: Negative for rash.   Allergic/Immunologic: Negative for food allergies.   Neurological: Negative for headaches.   Hematological: Does not bruise/bleed easily.   Psychiatric/Behavioral: The patient is not nervous/anxious.        I have reviewed the follow portions of the patient's history and confirmed they are accurate today:  allergies, current medications, past family history, past medical history, past social history, past surgical history, problem list and ROS  Physical Exam:     Physical Exam    Ht 170.2 cm (67\")   Wt 93 kg (205 lb)   BMI 32.11 kg/m²    Procedure:         Assessment:   No diagnosis found.  No orders of the defined types were placed in this encounter.      Plan:   Will see pt back in 6 months with a kub    Patient's Body mass index is 32.11 kg/m². BMI is above normal parameters. Recommendations include: educational material.      Counseling was given to patient for the following topics diagnostic results including: UA and CT scan. The interim medical history and current results were reviewed.  A treatment plan " with follow-up was made for No primary diagnosis found..  This document has been electronically signed by Viraj Benito MD June 19, 2018 2:57 PM

## 2018-06-22 LAB — BACTERIA SPEC AEROBE CULT: ABNORMAL

## 2018-06-22 RX ORDER — SULFAMETHOXAZOLE AND TRIMETHOPRIM 800; 160 MG/1; MG/1
1 TABLET ORAL 2 TIMES DAILY
Qty: 20 TABLET | Refills: 0 | Status: SHIPPED | OUTPATIENT
Start: 2018-06-22 | End: 2019-02-19 | Stop reason: ALTCHOICE

## 2018-06-29 ENCOUNTER — DOCUMENTATION (OUTPATIENT)
Dept: UROLOGY | Facility: CLINIC | Age: 58
End: 2018-06-29

## 2018-06-29 NOTE — PROGRESS NOTES
Patients urine culture result acknowledged, positive for Klebsiella. Patient was prescribed Bactrim per Dr Benito, Klebsiella showing sensitivity to Bactrim. Patient is to finish all Bactrim then return to clinic for a repeat urine culture.

## 2018-08-05 DIAGNOSIS — K21.9 GASTROESOPHAGEAL REFLUX DISEASE WITHOUT ESOPHAGITIS: ICD-10-CM

## 2018-08-06 RX ORDER — PANTOPRAZOLE SODIUM 40 MG/1
TABLET, DELAYED RELEASE ORAL
Qty: 90 TABLET | Refills: 0 | Status: SHIPPED | OUTPATIENT
Start: 2018-08-06 | End: 2019-01-23 | Stop reason: SDUPTHER

## 2018-08-06 RX ORDER — CYANOCOBALAMIN 1000 UG/ML
INJECTION, SOLUTION INTRAMUSCULAR; SUBCUTANEOUS
Qty: 3 ML | Refills: 0 | Status: SHIPPED | OUTPATIENT
Start: 2018-08-06 | End: 2018-09-10 | Stop reason: SDUPTHER

## 2018-09-10 DIAGNOSIS — F32.A DEPRESSION, UNSPECIFIED DEPRESSION TYPE: ICD-10-CM

## 2018-09-10 DIAGNOSIS — K21.9 GASTROESOPHAGEAL REFLUX DISEASE WITHOUT ESOPHAGITIS: ICD-10-CM

## 2018-09-10 RX ORDER — ONDANSETRON 4 MG/1
TABLET, FILM COATED ORAL
Qty: 30 TABLET | Refills: 0 | Status: SHIPPED | OUTPATIENT
Start: 2018-09-10 | End: 2019-02-19 | Stop reason: SDUPTHER

## 2018-09-10 RX ORDER — CYANOCOBALAMIN 1000 UG/ML
INJECTION, SOLUTION INTRAMUSCULAR; SUBCUTANEOUS
Qty: 3 ML | Refills: 0 | Status: SHIPPED | OUTPATIENT
Start: 2018-09-10 | End: 2020-02-25 | Stop reason: ALTCHOICE

## 2019-01-15 ENCOUNTER — HOSPITAL ENCOUNTER (OUTPATIENT)
Dept: GENERAL RADIOLOGY | Facility: HOSPITAL | Age: 59
Discharge: HOME | End: 2019-01-15
Attending: UROLOGY | Admitting: UROLOGY

## 2019-01-15 ENCOUNTER — OFFICE VISIT (OUTPATIENT)
Dept: UROLOGY | Facility: CLINIC | Age: 59
End: 2019-01-15

## 2019-01-15 VITALS — WEIGHT: 205 LBS | HEIGHT: 67 IN | BODY MASS INDEX: 32.18 KG/M2

## 2019-01-15 DIAGNOSIS — N20.0 KIDNEY STONES: ICD-10-CM

## 2019-01-15 DIAGNOSIS — N39.0 URINARY TRACT INFECTION WITHOUT HEMATURIA, SITE UNSPECIFIED: Primary | ICD-10-CM

## 2019-01-15 DIAGNOSIS — N20.1 URETERAL STONE: ICD-10-CM

## 2019-01-15 DIAGNOSIS — N20.0 KIDNEY STONE: ICD-10-CM

## 2019-01-15 LAB
ALBUMIN SERPL-MCNC: 4.2 G/DL (ref 3.4–4.8)
ALBUMIN/GLOB SERPL: 1.3 G/DL (ref 1.5–2.5)
ALP SERPL-CCNC: 78 U/L (ref 35–104)
ALT SERPL W P-5'-P-CCNC: 17 U/L (ref 10–36)
ANION GAP SERPL CALCULATED.3IONS-SCNC: 5.9 MMOL/L (ref 3.6–11.2)
AST SERPL-CCNC: 16 U/L (ref 10–30)
BILIRUB BLD-MCNC: NEGATIVE MG/DL
BILIRUB SERPL-MCNC: 0.3 MG/DL (ref 0.2–1.8)
BUN BLD-MCNC: 17 MG/DL (ref 7–21)
BUN/CREAT SERPL: 16.5 (ref 7–25)
CALCIUM SPEC-SCNC: 9.3 MG/DL (ref 7.7–10)
CHLORIDE SERPL-SCNC: 106 MMOL/L (ref 99–112)
CLARITY, POC: CLEAR
CO2 SERPL-SCNC: 27.1 MMOL/L (ref 24.3–31.9)
COLOR UR: YELLOW
CREAT BLD-MCNC: 1.03 MG/DL (ref 0.43–1.29)
GFR SERPL CREATININE-BSD FRML MDRD: 55 ML/MIN/1.73
GLOBULIN UR ELPH-MCNC: 3.3 GM/DL
GLUCOSE BLD-MCNC: 83 MG/DL (ref 70–110)
GLUCOSE UR STRIP-MCNC: NEGATIVE MG/DL
KETONES UR QL: NEGATIVE
LEUKOCYTE EST, POC: NEGATIVE
NITRITE UR-MCNC: NEGATIVE MG/ML
OSMOLALITY SERPL CALC.SUM OF ELEC: 278.2 MOSM/KG (ref 273–305)
PH UR: 6 [PH] (ref 5–8)
POTASSIUM BLD-SCNC: 4.8 MMOL/L (ref 3.5–5.3)
PROT SERPL-MCNC: 7.5 G/DL (ref 6–8)
PROT UR STRIP-MCNC: NEGATIVE MG/DL
RBC # UR STRIP: NEGATIVE /UL
SODIUM BLD-SCNC: 139 MMOL/L (ref 135–153)
SP GR UR: 1.02 (ref 1–1.03)
UROBILINOGEN UR QL: NORMAL

## 2019-01-15 PROCEDURE — 74018 RADEX ABDOMEN 1 VIEW: CPT | Performed by: RADIOLOGY

## 2019-01-15 PROCEDURE — 74018 RADEX ABDOMEN 1 VIEW: CPT

## 2019-01-15 PROCEDURE — 36415 COLL VENOUS BLD VENIPUNCTURE: CPT | Performed by: UROLOGY

## 2019-01-15 PROCEDURE — 80053 COMPREHEN METABOLIC PANEL: CPT | Performed by: UROLOGY

## 2019-01-15 PROCEDURE — 99213 OFFICE O/P EST LOW 20 MIN: CPT | Performed by: UROLOGY

## 2019-01-15 NOTE — PROGRESS NOTES
Chief Complaint:          Renal stones and possible ureteral stone    HPI:   58 y.o. female.  Pt's pain is unchange and does not really bother her.  She has had open surgery for stones on right and left sides in the 70's and In the 80's she had laser surgery for stones but no operations for stones since the 90's  Pt has not had a change in her back pain for years.  He kub showed possible renal sones and possible ureteral stone.  Her creat is 1.0 in April of this year.  HPI      Past Medical History:        Past Medical History:   Diagnosis Date   • B12 deficiency    • Fatigue    • GERD (gastroesophageal reflux disease)    • Hyperlipidemia    • Hypertension          Current Meds:     Current Outpatient Medications   Medication Sig Dispense Refill   • atorvastatin (LIPITOR) 10 MG tablet Take 1 tablet by mouth Daily. 90 tablet 3   • cholecalciferol (VITAMIN D3) 1000 UNITS tablet Take 1,000 Units by mouth daily.     • cyanocobalamin 1000 MCG/ML injection INJECT AS DIRECTED EVERY MONTH. 3 mL 0   • lisinopril (PRINIVIL,ZESTRIL) 20 MG tablet Take 1 tablet by mouth Daily. 90 tablet 2   • ondansetron (ZOFRAN) 4 MG tablet Take 1 tablet by mouth Every 8 (Eight) Hours As Needed for Nausea or Vomiting. 30 tablet 0   • ondansetron (ZOFRAN) 4 MG tablet TAKE 1 TABLET BY MOUTH EVERY 8 HOURS AS NEEDED FOR NAUSEA OR VOMITING 30 tablet 0   • pantoprazole (PROTONIX) 40 MG EC tablet TAKE 1 TABLET BY MOUTH DAILY 90 tablet 0   • sertraline (ZOLOFT) 50 MG tablet TAKE 1 TABLET BY MOUTH DAILY 90 tablet 0   • solifenacin (VESICARE) 10 MG tablet Take 1 tablet by mouth Daily. 30 tablet 11   • sulfamethoxazole-trimethoprim (BACTRIM DS,SEPTRA DS) 800-160 MG per tablet Take 1 tablet by mouth 2 (Two) Times a Day. 20 tablet 0     No current facility-administered medications for this visit.         Allergies:      No Known Allergies     Past Surgical History:     Past Surgical History:   Procedure Laterality Date   • CARDIOVASCULAR STRESS TEST   2017   •  SECTION     • ECHO - CONVERTED  2017   • KIDNEY SURGERY           Social History:     Social History     Socioeconomic History   • Marital status:      Spouse name: Not on file   • Number of children: Not on file   • Years of education: Not on file   • Highest education level: Not on file   Social Needs   • Financial resource strain: Not on file   • Food insecurity - worry: Not on file   • Food insecurity - inability: Not on file   • Transportation needs - medical: Not on file   • Transportation needs - non-medical: Not on file   Occupational History   • Not on file   Tobacco Use   • Smoking status: Never Smoker   • Smokeless tobacco: Never Used   Substance and Sexual Activity   • Alcohol use: No   • Drug use: No   • Sexual activity: Defer   Other Topics Concern   • Not on file   Social History Narrative   • Not on file       Family History:     Family History   Problem Relation Age of Onset   • Hyperlipidemia Mother    • Hypertension Mother    • Heart attack Father 65   • Cancer Father    • Hyperlipidemia Brother    • Hypertension Brother        Review of Systems:     Review of Systems   Constitutional: Negative for chills, fatigue and fever.   HENT: Negative for congestion and sore throat.    Eyes: Negative for pain.   Respiratory: Negative for cough, chest tightness and shortness of breath.    Cardiovascular: Negative for chest pain and palpitations.   Gastrointestinal: Negative for abdominal pain.   Endocrine: Negative for polydipsia.   Genitourinary: Positive for flank pain. Negative for decreased urine volume, difficulty urinating, frequency, hematuria, pelvic pain, vaginal bleeding, vaginal discharge and vaginal pain.   Musculoskeletal: Negative for back pain.   Neurological: Negative for dizziness and headaches.   Hematological: Does not bruise/bleed easily.   Psychiatric/Behavioral: Negative for behavioral problems.         I have reviewed the follow portions of the  "patient's history and confirmed they are accurate today:  allergies, current medications, past family history, past medical history, past social history, past surgical history, problem list and ROS  Physical Exam:     Physical Exam   Constitutional: She is oriented to person, place, and time. She appears well-developed.   HENT:   Head: Normocephalic.   Right Ear: External ear normal.   Left Ear: External ear normal.   Nose: Nose normal.   Mouth/Throat: Oropharynx is clear and moist.   Eyes: Conjunctivae and EOM are normal. Pupils are equal, round, and reactive to light.   Neck: Normal range of motion. Neck supple. No tracheal deviation present. No thyromegaly present.   Cardiovascular: Normal heart sounds. Exam reveals no gallop and no friction rub.   No murmur heard.  Pulmonary/Chest: Effort normal and breath sounds normal. No respiratory distress. She has no wheezes. She has no rales. She exhibits no tenderness.   Abdominal: Soft. Bowel sounds are normal. She exhibits no distension and no mass. There is no tenderness. There is no rebound and no guarding. No hernia.   Musculoskeletal: Normal range of motion. She exhibits no edema.   Lymphadenopathy:     She has no cervical adenopathy.   Neurological: She is alert and oriented to person, place, and time. She displays normal reflexes. No cranial nerve deficit. She exhibits normal muscle tone. Coordination normal.   Skin: Skin is warm. No rash noted.   Psychiatric: She has a normal mood and affect. Judgment normal.       Ht 170.2 cm (67.01\")   Wt 93 kg (205 lb)   BMI 32.10 kg/m²    Procedure:         Assessment:     Encounter Diagnoses   Name Primary?   • Urinary tract infection without hematuria, site unspecified Yes   • Kidney stone    • Ureteral stone      Orders Placed This Encounter   Procedures   • POC Urinalysis Dipstick, Automated       Plan:   Will get ct scan stone study and cmp  Will see her back in 3 weeks.    Patient's Body mass index is 32.1 kg/m². BMI " is above normal parameters. Recommendations include: educational material.      Counseling was given to patient for the following topics diagnostic results including: kub. The interim medical history and current results were reviewed.  A treatment plan with follow-up was made for Urinary tract infection without hematuria, site unspecified [N39.0].  This document has been electronically signed by Viraj Benito MD January 15, 2019 11:48 AM

## 2019-01-23 ENCOUNTER — HOSPITAL ENCOUNTER (OUTPATIENT)
Dept: CT IMAGING | Facility: HOSPITAL | Age: 59
Discharge: HOME | End: 2019-01-23
Attending: UROLOGY | Admitting: UROLOGY

## 2019-01-23 DIAGNOSIS — N20.1 URETERAL STONE: ICD-10-CM

## 2019-01-23 DIAGNOSIS — N20.0 KIDNEY STONE: ICD-10-CM

## 2019-01-23 DIAGNOSIS — K21.9 GASTROESOPHAGEAL REFLUX DISEASE WITHOUT ESOPHAGITIS: ICD-10-CM

## 2019-01-23 PROCEDURE — 74176 CT ABD & PELVIS W/O CONTRAST: CPT | Performed by: RADIOLOGY

## 2019-01-23 PROCEDURE — 74176 CT ABD & PELVIS W/O CONTRAST: CPT

## 2019-01-23 RX ORDER — PANTOPRAZOLE SODIUM 40 MG/1
TABLET, DELAYED RELEASE ORAL
Qty: 90 TABLET | Refills: 0 | Status: SHIPPED | OUTPATIENT
Start: 2019-01-23 | End: 2019-05-28 | Stop reason: SDUPTHER

## 2019-02-05 ENCOUNTER — OFFICE VISIT (OUTPATIENT)
Dept: UROLOGY | Facility: CLINIC | Age: 59
End: 2019-02-05

## 2019-02-05 VITALS — WEIGHT: 205 LBS | BODY MASS INDEX: 32.18 KG/M2 | HEIGHT: 67 IN

## 2019-02-05 DIAGNOSIS — G89.29 CHRONIC LOW BACK PAIN WITHOUT SCIATICA, UNSPECIFIED BACK PAIN LATERALITY: ICD-10-CM

## 2019-02-05 DIAGNOSIS — M54.50 CHRONIC LOW BACK PAIN WITHOUT SCIATICA, UNSPECIFIED BACK PAIN LATERALITY: ICD-10-CM

## 2019-02-05 DIAGNOSIS — N39.0 URINARY TRACT INFECTION WITHOUT HEMATURIA, SITE UNSPECIFIED: Primary | ICD-10-CM

## 2019-02-05 DIAGNOSIS — N20.0 KIDNEY STONE: ICD-10-CM

## 2019-02-05 LAB
BILIRUB BLD-MCNC: NEGATIVE MG/DL
CLARITY, POC: CLEAR
COLOR UR: YELLOW
GLUCOSE UR STRIP-MCNC: NEGATIVE MG/DL
KETONES UR QL: NEGATIVE
LEUKOCYTE EST, POC: NEGATIVE
NITRITE UR-MCNC: NEGATIVE MG/ML
PH UR: 6 [PH] (ref 5–8)
PROT UR STRIP-MCNC: NEGATIVE MG/DL
RBC # UR STRIP: NEGATIVE /UL
SP GR UR: 1.03 (ref 1–1.03)
UROBILINOGEN UR QL: NORMAL

## 2019-02-05 PROCEDURE — 99213 OFFICE O/P EST LOW 20 MIN: CPT | Performed by: UROLOGY

## 2019-02-05 NOTE — PROGRESS NOTES
Chief Complaint:          Back pain.    HPI:   58 y.o. female.  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  Her ct scan is unchanged.  Her UA is negative for infections.  The ct scan did not show any  hyrdronephrosis or masses.  HPI      Past Medical History:        Past Medical History:   Diagnosis Date   • B12 deficiency    • Fatigue    • GERD (gastroesophageal reflux disease)    • Hyperlipidemia    • Hypertension          Current Meds:     Current Outpatient Medications   Medication Sig Dispense Refill   • atorvastatin (LIPITOR) 10 MG tablet Take 1 tablet by mouth Daily. 90 tablet 3   • cholecalciferol (VITAMIN D3) 1000 UNITS tablet Take 1,000 Units by mouth daily.     • cyanocobalamin 1000 MCG/ML injection INJECT AS DIRECTED EVERY MONTH. 3 mL 0   • lisinopril (PRINIVIL,ZESTRIL) 20 MG tablet Take 1 tablet by mouth Daily. 90 tablet 2   • ondansetron (ZOFRAN) 4 MG tablet Take 1 tablet by mouth Every 8 (Eight) Hours As Needed for Nausea or Vomiting. 30 tablet 0   • ondansetron (ZOFRAN) 4 MG tablet TAKE 1 TABLET BY MOUTH EVERY 8 HOURS AS NEEDED FOR NAUSEA OR VOMITING 30 tablet 0   • pantoprazole (PROTONIX) 40 MG EC tablet TAKE 1 TABLET BY MOUTH DAILY 90 tablet 0   • sertraline (ZOLOFT) 50 MG tablet TAKE 1 TABLET BY MOUTH DAILY 90 tablet 0   • solifenacin (VESICARE) 10 MG tablet Take 1 tablet by mouth Daily. 30 tablet 11   • sulfamethoxazole-trimethoprim (BACTRIM DS,SEPTRA DS) 800-160 MG per tablet Take 1 tablet by mouth 2 (Two) Times a Day. 20 tablet 0     No current facility-administered medications for this visit.         Allergies:      No Known Allergies     Past Surgical History:     Past Surgical History:   Procedure Laterality Date   • CARDIOVASCULAR STRESS TEST  2017   •  SECTION     • ECHO - CONVERTED  2017   • KIDNEY SURGERY           Social History:     Social History     Socioeconomic History   • Marital status:      Spouse name: Not on file   • Number of  children: Not on file   • Years of education: Not on file   • Highest education level: Not on file   Social Needs   • Financial resource strain: Not on file   • Food insecurity - worry: Not on file   • Food insecurity - inability: Not on file   • Transportation needs - medical: Not on file   • Transportation needs - non-medical: Not on file   Occupational History   • Not on file   Tobacco Use   • Smoking status: Never Smoker   • Smokeless tobacco: Never Used   Substance and Sexual Activity   • Alcohol use: No   • Drug use: No   • Sexual activity: Defer   Other Topics Concern   • Not on file   Social History Narrative   • Not on file       Family History:     Family History   Problem Relation Age of Onset   • Hyperlipidemia Mother    • Hypertension Mother    • Heart attack Father 65   • Cancer Father    • Hyperlipidemia Brother    • Hypertension Brother        Review of Systems:     Review of Systems   Respiratory: Negative for chest tightness.    Genitourinary: Positive for frequency.   Neurological: Negative for headaches.   Hematological: Does not bruise/bleed easily.             I have reviewed the follow portions of the patient's history and confirmed they are accurate today:  allergies, current medications, past family history, past medical history, past social history, past surgical history, problem list and ROS  Physical Exam:     Physical Exam   Constitutional: She is oriented to person, place, and time. She appears well-developed.   HENT:   Head: Normocephalic.   Right Ear: External ear normal.   Left Ear: External ear normal.   Nose: Nose normal.   Mouth/Throat: Oropharynx is clear and moist.   Eyes: Conjunctivae and EOM are normal. Pupils are equal, round, and reactive to light.   Neck: Normal range of motion. Neck supple. No tracheal deviation present. No thyromegaly present.   Cardiovascular: Normal heart sounds. Exam reveals no gallop and no friction rub.   No murmur heard.  Pulmonary/Chest: Effort  "normal and breath sounds normal. No respiratory distress. She has no wheezes. She has no rales. She exhibits no tenderness.   Abdominal: Soft. Bowel sounds are normal. She exhibits no distension and no mass. There is no tenderness. There is no rebound and no guarding. No hernia.   Genitourinary: Vagina normal and uterus normal.   Musculoskeletal: Normal range of motion. She exhibits no edema.   Lymphadenopathy:     She has no cervical adenopathy.   Neurological: She is alert and oriented to person, place, and time. She displays normal reflexes. No cranial nerve deficit. She exhibits normal muscle tone. Coordination normal.   Skin: Skin is warm. No rash noted.   Psychiatric: She has a normal mood and affect. Judgment normal.       Ht 170.2 cm (67\")   Wt 93 kg (205 lb)   BMI 32.11 kg/m²    Procedure:         Assessment:     Encounter Diagnoses   Name Primary?   • Urinary tract infection without hematuria, site unspecified Yes   • Kidney stone    • Chronic low back pain without sciatica, unspecified back pain laterality      Orders Placed This Encounter   Procedures   • POC Urinalysis Dipstick       Plan:   Will see pt back in 6 months.  Pt has had infections in the past but does not have them at this time and that is why we checked her UA today.  Pt has nephrocalcinosis with a scar and parenchymal calcifications from old infections.    Patient's Body mass index is 32.11 kg/m². BMI is above normal parameters. Recommendations include: educational material.      Counseling was given to patient for the following topics diagnostic results including: ct scan. The interim medical history and current results were reviewed.  A treatment plan with follow-up was made for Urinary tract infection without hematuria, site unspecified [N39.0].  This document has been electronically signed by Viraj Benito MD February 15, 2019 3:49 PM  "

## 2019-02-15 ENCOUNTER — PRIOR AUTHORIZATION (OUTPATIENT)
Dept: UROLOGY | Facility: CLINIC | Age: 59
End: 2019-02-15

## 2019-02-19 ENCOUNTER — LAB (OUTPATIENT)
Dept: LAB | Facility: HOSPITAL | Age: 59
End: 2019-02-19

## 2019-02-19 ENCOUNTER — TELEPHONE (OUTPATIENT)
Dept: UROLOGY | Facility: CLINIC | Age: 59
End: 2019-02-19

## 2019-02-19 ENCOUNTER — TELEPHONE (OUTPATIENT)
Dept: CARDIOLOGY | Facility: CLINIC | Age: 59
End: 2019-02-19

## 2019-02-19 ENCOUNTER — OFFICE VISIT (OUTPATIENT)
Dept: CARDIOLOGY | Facility: CLINIC | Age: 59
End: 2019-02-19

## 2019-02-19 VITALS
SYSTOLIC BLOOD PRESSURE: 140 MMHG | HEIGHT: 67 IN | OXYGEN SATURATION: 98 % | DIASTOLIC BLOOD PRESSURE: 78 MMHG | BODY MASS INDEX: 30.13 KG/M2 | WEIGHT: 192 LBS | HEART RATE: 64 BPM

## 2019-02-19 DIAGNOSIS — E55.9 VITAMIN D DEFICIENCY: ICD-10-CM

## 2019-02-19 DIAGNOSIS — E53.8 B12 DEFICIENCY: ICD-10-CM

## 2019-02-19 DIAGNOSIS — I10 ESSENTIAL HYPERTENSION: ICD-10-CM

## 2019-02-19 DIAGNOSIS — F41.9 ANXIETY: ICD-10-CM

## 2019-02-19 DIAGNOSIS — E78.2 MIXED HYPERLIPIDEMIA: Primary | ICD-10-CM

## 2019-02-19 DIAGNOSIS — E78.2 MIXED HYPERLIPIDEMIA: ICD-10-CM

## 2019-02-19 DIAGNOSIS — R53.82 CHRONIC FATIGUE: ICD-10-CM

## 2019-02-19 DIAGNOSIS — K21.9 GASTROESOPHAGEAL REFLUX DISEASE WITHOUT ESOPHAGITIS: ICD-10-CM

## 2019-02-19 LAB
25(OH)D3 SERPL-MCNC: 36 NG/ML
ALBUMIN SERPL-MCNC: 4.3 G/DL (ref 3.4–4.8)
ALBUMIN/GLOB SERPL: 1.4 G/DL (ref 1.5–2.5)
ALP SERPL-CCNC: 69 U/L (ref 35–104)
ALT SERPL W P-5'-P-CCNC: 30 U/L (ref 10–36)
ANION GAP SERPL CALCULATED.3IONS-SCNC: 5.7 MMOL/L (ref 3.6–11.2)
AST SERPL-CCNC: 26 U/L (ref 10–30)
BASOPHILS # BLD AUTO: 0.02 10*3/MM3 (ref 0–0.3)
BASOPHILS NFR BLD AUTO: 0.3 % (ref 0–2)
BILIRUB SERPL-MCNC: 0.5 MG/DL (ref 0.2–1.8)
BUN BLD-MCNC: 14 MG/DL (ref 7–21)
BUN/CREAT SERPL: 14.1 (ref 7–25)
CALCIUM SPEC-SCNC: 9.6 MG/DL (ref 7.7–10)
CHLORIDE SERPL-SCNC: 107 MMOL/L (ref 99–112)
CHOLEST SERPL-MCNC: 204 MG/DL (ref 0–200)
CK SERPL-CCNC: 101 U/L (ref 24–173)
CO2 SERPL-SCNC: 27.3 MMOL/L (ref 24.3–31.9)
CREAT BLD-MCNC: 0.99 MG/DL (ref 0.43–1.29)
DEPRECATED RDW RBC AUTO: 42.7 FL (ref 37–54)
EOSINOPHIL # BLD AUTO: 0.17 10*3/MM3 (ref 0–0.7)
EOSINOPHIL NFR BLD AUTO: 2.5 % (ref 0–5)
ERYTHROCYTE [DISTWIDTH] IN BLOOD BY AUTOMATED COUNT: 13.1 % (ref 11.5–14.5)
GFR SERPL CREATININE-BSD FRML MDRD: 57 ML/MIN/1.73
GLOBULIN UR ELPH-MCNC: 3 GM/DL
GLUCOSE BLD-MCNC: 87 MG/DL (ref 70–110)
HCT VFR BLD AUTO: 40.1 % (ref 37–47)
HDLC SERPL-MCNC: 46 MG/DL (ref 60–100)
HGB BLD-MCNC: 12.7 G/DL (ref 12–16)
IMM GRANULOCYTES # BLD AUTO: 0.01 10*3/MM3 (ref 0–0.03)
IMM GRANULOCYTES NFR BLD AUTO: 0.1 % (ref 0–0.5)
LDLC SERPL CALC-MCNC: 131 MG/DL (ref 0–100)
LDLC/HDLC SERPL: 2.85 {RATIO}
LYMPHOCYTES # BLD AUTO: 1.77 10*3/MM3 (ref 1–3)
LYMPHOCYTES NFR BLD AUTO: 25.8 % (ref 21–51)
MCH RBC QN AUTO: 28.8 PG (ref 27–33)
MCHC RBC AUTO-ENTMCNC: 31.7 G/DL (ref 33–37)
MCV RBC AUTO: 90.9 FL (ref 80–94)
MONOCYTES # BLD AUTO: 0.65 10*3/MM3 (ref 0.1–0.9)
MONOCYTES NFR BLD AUTO: 9.5 % (ref 0–10)
NEUTROPHILS # BLD AUTO: 4.23 10*3/MM3 (ref 1.4–6.5)
NEUTROPHILS NFR BLD AUTO: 61.8 % (ref 30–70)
OSMOLALITY SERPL CALC.SUM OF ELEC: 279.2 MOSM/KG (ref 273–305)
PLATELET # BLD AUTO: 219 10*3/MM3 (ref 130–400)
PMV BLD AUTO: 10.1 FL (ref 6–10)
POTASSIUM BLD-SCNC: 4.3 MMOL/L (ref 3.5–5.3)
PROT SERPL-MCNC: 7.3 G/DL (ref 6–8)
RBC # BLD AUTO: 4.41 10*6/MM3 (ref 4.2–5.4)
SODIUM BLD-SCNC: 140 MMOL/L (ref 135–153)
T4 FREE SERPL-MCNC: 0.92 NG/DL (ref 0.89–1.76)
TRIGL SERPL-MCNC: 135 MG/DL (ref 0–150)
TSH SERPL DL<=0.05 MIU/L-ACNC: 1.51 MIU/ML (ref 0.55–4.78)
VIT B12 BLD-MCNC: 1283 PG/ML (ref 211–911)
VLDLC SERPL-MCNC: 27 MG/DL
WBC NRBC COR # BLD: 6.85 10*3/MM3 (ref 4.5–12.5)

## 2019-02-19 PROCEDURE — 82550 ASSAY OF CK (CPK): CPT

## 2019-02-19 PROCEDURE — 85025 COMPLETE CBC W/AUTO DIFF WBC: CPT

## 2019-02-19 PROCEDURE — 87186 SC STD MICRODIL/AGAR DIL: CPT | Performed by: UROLOGY

## 2019-02-19 PROCEDURE — 84439 ASSAY OF FREE THYROXINE: CPT

## 2019-02-19 PROCEDURE — 82306 VITAMIN D 25 HYDROXY: CPT

## 2019-02-19 PROCEDURE — 99214 OFFICE O/P EST MOD 30 MIN: CPT | Performed by: INTERNAL MEDICINE

## 2019-02-19 PROCEDURE — 80053 COMPREHEN METABOLIC PANEL: CPT

## 2019-02-19 PROCEDURE — 80061 LIPID PANEL: CPT

## 2019-02-19 PROCEDURE — 84443 ASSAY THYROID STIM HORMONE: CPT

## 2019-02-19 PROCEDURE — 36415 COLL VENOUS BLD VENIPUNCTURE: CPT

## 2019-02-19 PROCEDURE — 87086 URINE CULTURE/COLONY COUNT: CPT | Performed by: UROLOGY

## 2019-02-19 PROCEDURE — 82607 VITAMIN B-12: CPT

## 2019-02-19 PROCEDURE — 87077 CULTURE AEROBIC IDENTIFY: CPT | Performed by: UROLOGY

## 2019-02-19 RX ORDER — ONDANSETRON 4 MG/1
4 TABLET, FILM COATED ORAL EVERY 8 HOURS PRN
Qty: 30 TABLET | Refills: 0 | Status: SHIPPED | OUTPATIENT
Start: 2019-02-19 | End: 2019-07-16 | Stop reason: SDUPTHER

## 2019-02-19 RX ORDER — LISINOPRIL 20 MG/1
20 TABLET ORAL DAILY
Qty: 90 TABLET | Refills: 3 | Status: SHIPPED | OUTPATIENT
Start: 2019-02-19 | End: 2019-05-20 | Stop reason: SDUPTHER

## 2019-02-19 RX ORDER — ATORVASTATIN CALCIUM 20 MG/1
20 TABLET, FILM COATED ORAL DAILY
Qty: 90 TABLET | Refills: 3 | Status: SHIPPED | OUTPATIENT
Start: 2019-02-19 | End: 2020-02-25 | Stop reason: SDUPTHER

## 2019-02-19 NOTE — PROGRESS NOTES
subjective     Chief Complaint   Patient presents with   • Hyperlipidemia   • Hypertension   • Follow-up     History of Present Illness  Patient is here for follow-up.  She has been been under a lot of stress today she is taking care of her grandchild.  She is tolerating Lipitor 10 mg daily but she has not had lab work in a while lab work will be done today.    Blood pressure is very well controlled with current medications.  Refills will be given.  She denies any palpitations at this time.  Because of fatigue she has been taking B12.  And vitamin D also  Lab work will be reviewed.  GI symptoms are better.  Urinary urgency is much better with the Vesicare.      Past Surgical History:   Procedure Laterality Date   • CARDIOVASCULAR STRESS TEST  2017   •  SECTION     • ECHO - CONVERTED  2017   • KIDNEY SURGERY       Family History   Problem Relation Age of Onset   • Hyperlipidemia Mother    • Hypertension Mother    • Heart attack Father 65   • Cancer Father    • Hyperlipidemia Brother    • Hypertension Brother      Past Medical History:   Diagnosis Date   • B12 deficiency    • Fatigue    • GERD (gastroesophageal reflux disease)    • Hyperlipidemia    • Hypertension      Patient Active Problem List   Diagnosis   • Hypertension   • Hyperlipidemia   • B12 deficiency   • GERD (gastroesophageal reflux disease)   • Fatigue   • Anxiety   • Palpitation   • Urgency incontinence   • Skin rash   • Vitamin D deficiency       Social History     Tobacco Use   • Smoking status: Never Smoker   • Smokeless tobacco: Never Used   Substance Use Topics   • Alcohol use: No   • Drug use: No       No Known Allergies    Current Outpatient Medications on File Prior to Visit   Medication Sig   • cholecalciferol (VITAMIN D3) 1000 UNITS tablet Take 1,000 Units by mouth daily.   • cyanocobalamin 1000 MCG/ML injection INJECT AS DIRECTED EVERY MONTH.   • pantoprazole (PROTONIX) 40 MG EC tablet TAKE 1 TABLET BY MOUTH DAILY   •  "sertraline (ZOLOFT) 50 MG tablet TAKE 1 TABLET BY MOUTH DAILY   • solifenacin (VESICARE) 10 MG tablet Take 1 tablet by mouth Daily.   • [DISCONTINUED] atorvastatin (LIPITOR) 10 MG tablet Take 1 tablet by mouth Daily.   • [DISCONTINUED] lisinopril (PRINIVIL,ZESTRIL) 20 MG tablet Take 1 tablet by mouth Daily.   • [DISCONTINUED] ondansetron (ZOFRAN) 4 MG tablet Take 1 tablet by mouth Every 8 (Eight) Hours As Needed for Nausea or Vomiting.   • [DISCONTINUED] ondansetron (ZOFRAN) 4 MG tablet TAKE 1 TABLET BY MOUTH EVERY 8 HOURS AS NEEDED FOR NAUSEA OR VOMITING   • [DISCONTINUED] sulfamethoxazole-trimethoprim (BACTRIM DS,SEPTRA DS) 800-160 MG per tablet Take 1 tablet by mouth 2 (Two) Times a Day.     No current facility-administered medications on file prior to visit.          The following portions of the patient's history were reviewed and updated as appropriate: allergies, current medications, past family history, past medical history, past social history, past surgical history and problem list.    Review of Systems   Constitution: Positive for weakness and malaise/fatigue.   HENT: Negative.  Negative for congestion.    Eyes: Negative.    Cardiovascular: Negative.  Negative for chest pain, cyanosis, dyspnea on exertion, irregular heartbeat, leg swelling, near-syncope, orthopnea, palpitations, paroxysmal nocturnal dyspnea and syncope.   Respiratory: Negative.  Negative for shortness of breath.    Hematologic/Lymphatic: Negative.    Musculoskeletal: Negative.    Gastrointestinal: Negative.    Neurological: Negative for headaches.   Psychiatric/Behavioral: The patient is nervous/anxious.           Objective:     /78 (BP Location: Left arm, Patient Position: Sitting)   Pulse 64   Ht 170.2 cm (67\")   Wt 87.1 kg (192 lb)   SpO2 98%   BMI 30.07 kg/m²   Physical Exam   Constitutional: She appears well-developed and well-nourished. No distress.   HENT:   Head: Normocephalic and atraumatic.   Mouth/Throat: Oropharynx " is clear and moist. No oropharyngeal exudate.   Eyes: Conjunctivae and EOM are normal. Pupils are equal, round, and reactive to light. No scleral icterus.   Neck: Normal range of motion. Neck supple. No JVD present. No tracheal deviation present. No thyromegaly present.   Cardiovascular: Normal rate, regular rhythm, normal heart sounds and intact distal pulses. PMI is not displaced. Exam reveals no gallop, no friction rub and no decreased pulses.   No murmur heard.  Pulses:       Carotid pulses are 3+ on the right side, and 3+ on the left side.       Radial pulses are 3+ on the right side, and 3+ on the left side.   Pulmonary/Chest: Effort normal and breath sounds normal. No respiratory distress. She has no wheezes. She has no rales. She exhibits no tenderness.   Abdominal: Soft. Bowel sounds are normal. She exhibits no distension, no abdominal bruit and no mass. There is no splenomegaly or hepatomegaly. There is no tenderness. There is no rebound and no guarding.   Musculoskeletal: Normal range of motion. She exhibits no edema, tenderness or deformity.   Lymphadenopathy:     She has no cervical adenopathy.   Neurological: She is alert. She has normal reflexes. No cranial nerve deficit. She exhibits normal muscle tone. Coordination normal.   Skin: Skin is warm and dry. No rash noted. She is not diaphoretic. No erythema.   Psychiatric: She has a normal mood and affect. Her behavior is normal. Judgment and thought content normal.         Lab Review  Lab Results   Component Value Date     02/19/2019    K 4.3 02/19/2019     02/19/2019    BUN 14 02/19/2019    CREATININE 0.99 02/19/2019    GLUCOSE 87 02/19/2019    CALCIUM 9.6 02/19/2019    ALT 30 02/19/2019    ALKPHOS 69 02/19/2019    LABIL2 1.2 (L) 02/02/2016     Lab Results   Component Value Date    CKTOTAL 101 02/19/2019     Lab Results   Component Value Date    WBC 6.85 02/19/2019    HGB 12.7 02/19/2019    HCT 40.1 02/19/2019     02/19/2019     No  results found for: INR  Lab Results   Component Value Date    MG 2.0 04/17/2014     Lab Results   Component Value Date    TSH 1.510 02/19/2019     No results found for: BNP  Lab Results   Component Value Date    CHLPL 172 02/02/2016    CHOL 204 (H) 02/19/2019    TRIG 135 02/19/2019    HDL 46 (L) 02/19/2019    VLDL 27 02/19/2019    LDLHDL 2.85 02/19/2019     Lab Results   Component Value Date     (H) 02/19/2019     (H) 04/22/2018       Procedures       I personally viewed and interpreted the patient's LAB data         Assessment:     1. Mixed hyperlipidemia    2. Gastroesophageal reflux disease without esophagitis    3. Essential hypertension    4. Chronic fatigue    5. Anxiety    6. B12 deficiency    7. Vitamin D deficiency          Plan:   Labs obtained and results   reviewed and discussed with the patient and lipids are abnormal with LDL of 131.  Patient was advised to increase Lipitor 20 mg daily.  Healthy lifestyle was also discussed.    B12 level is quite high.  Patient was advised that B12 injections are not necessary at this time.  We'll check lab work next time without B12  Blood pressure is fairly well controlled no change in therapy.  Vitamin D level is good vitamin D was continued.  Refills were given.  No change in therapy          No Follow-up on file.

## 2019-02-19 NOTE — TELEPHONE ENCOUNTER
Patient came in the office for a UA and UC and patient stated she was hurting, per Dr calle called in Septra 800 mg BID # 20 and Pyridium 200 mg tid # 30 no refills. Patients urine was sent off for a urine culture. Patient verbalized understanding when I spoke with her.

## 2019-02-21 NOTE — TELEPHONE ENCOUNTER
PA for Vesicare was approved from 02/16/2019-02/16/2020.    Patient called and notified of this outcome.

## 2019-04-26 ENCOUNTER — TELEPHONE (OUTPATIENT)
Dept: UROLOGY | Facility: CLINIC | Age: 59
End: 2019-04-26

## 2019-04-26 ENCOUNTER — TELEPHONE (OUTPATIENT)
Dept: CARDIOLOGY | Facility: CLINIC | Age: 59
End: 2019-04-26

## 2019-04-26 DIAGNOSIS — N32.81 OVERACTIVE BLADDER: Primary | ICD-10-CM

## 2019-04-26 DIAGNOSIS — F32.A DEPRESSION, UNSPECIFIED DEPRESSION TYPE: ICD-10-CM

## 2019-04-26 RX ORDER — SOLIFENACIN SUCCINATE 10 MG/1
10 TABLET, FILM COATED ORAL DAILY
Qty: 30 TABLET | Refills: 11 | Status: SHIPPED | OUTPATIENT
Start: 2019-04-26 | End: 2020-01-15

## 2019-04-26 NOTE — TELEPHONE ENCOUNTER
zoloft 50 mg 1 qd  [facility]    She request this 2 days ago thru her pharmacy and they told her to call us to request it

## 2019-04-26 NOTE — TELEPHONE ENCOUNTER
Patient called and was needing a refill on vesicare 10mg, I have sent that to her pharmacy pending co-sign from Dr. Benito.     Thanks,   Leyla Choudhury, CMA

## 2019-05-20 ENCOUNTER — OFFICE VISIT (OUTPATIENT)
Dept: CARDIOLOGY | Facility: CLINIC | Age: 59
End: 2019-05-20

## 2019-05-20 VITALS
BODY MASS INDEX: 31.08 KG/M2 | WEIGHT: 198 LBS | HEART RATE: 60 BPM | SYSTOLIC BLOOD PRESSURE: 164 MMHG | HEIGHT: 67 IN | OXYGEN SATURATION: 98 % | DIASTOLIC BLOOD PRESSURE: 82 MMHG

## 2019-05-20 DIAGNOSIS — E78.2 MIXED HYPERLIPIDEMIA: ICD-10-CM

## 2019-05-20 DIAGNOSIS — E53.8 B12 DEFICIENCY: ICD-10-CM

## 2019-05-20 DIAGNOSIS — R53.82 CHRONIC FATIGUE: ICD-10-CM

## 2019-05-20 DIAGNOSIS — F32.A DEPRESSION, UNSPECIFIED DEPRESSION TYPE: ICD-10-CM

## 2019-05-20 DIAGNOSIS — F41.9 ANXIETY: ICD-10-CM

## 2019-05-20 DIAGNOSIS — R00.2 PALPITATION: ICD-10-CM

## 2019-05-20 DIAGNOSIS — I10 ESSENTIAL HYPERTENSION: Primary | ICD-10-CM

## 2019-05-20 PROCEDURE — 99214 OFFICE O/P EST MOD 30 MIN: CPT | Performed by: INTERNAL MEDICINE

## 2019-05-20 RX ORDER — LISINOPRIL 20 MG/1
20 TABLET ORAL 2 TIMES DAILY
Qty: 180 TABLET | Refills: 3 | Status: SHIPPED | OUTPATIENT
Start: 2019-05-20 | End: 2020-02-25 | Stop reason: SDUPTHER

## 2019-05-20 RX ORDER — LORATADINE 10 MG/1
10 TABLET ORAL DAILY
Qty: 90 TABLET | Refills: 0 | Status: SHIPPED | OUTPATIENT
Start: 2019-05-20 | End: 2020-02-25 | Stop reason: ALTCHOICE

## 2019-05-20 NOTE — PROGRESS NOTES
subjective     Chief Complaint   Patient presents with   • Hyperlipidemia   • Follow-up     History of Present Illness  Patient is 59 years old white female who is here for follow-up of multiple chronic medical problems.  Blood pressure is elevated.  She is taking her medications regularly.  She takes lisinopril 20 mg daily.  Blood pressure is 164/82.    Patient also has hyperlipidemia and is taking Lipitor 20 mg daily without any drug side effects.    GI symptoms which are GERD type symptoms are better with the Protonix.  She was advised to continue Protonix.  Patient denies any nausea but she still has some Zofran available.    Patient complains of being weak and tired and has lot of anxiety.  She is taking Zoloft 50 mg daily.  Will arrange psychiatric consult    Past Surgical History:   Procedure Laterality Date   • CARDIOVASCULAR STRESS TEST  2017   •  SECTION     • ECHO - CONVERTED  2017   • KIDNEY SURGERY       Family History   Problem Relation Age of Onset   • Hyperlipidemia Mother    • Hypertension Mother    • Heart attack Father 65   • Cancer Father    • Hyperlipidemia Brother    • Hypertension Brother      Past Medical History:   Diagnosis Date   • B12 deficiency    • Fatigue    • GERD (gastroesophageal reflux disease)    • Hyperlipidemia    • Hypertension      Patient Active Problem List   Diagnosis   • Hypertension   • Hyperlipidemia   • B12 deficiency   • GERD (gastroesophageal reflux disease)   • Fatigue   • Anxiety   • Palpitation   • Urgency incontinence   • Skin rash   • Vitamin D deficiency       Social History     Tobacco Use   • Smoking status: Never Smoker   • Smokeless tobacco: Never Used   Substance Use Topics   • Alcohol use: No   • Drug use: No       No Known Allergies    Current Outpatient Medications on File Prior to Visit   Medication Sig   • atorvastatin (LIPITOR) 20 MG tablet Take 1 tablet by mouth Daily.   • cholecalciferol (VITAMIN D3) 1000 UNITS tablet Take 1,000  "Units by mouth daily.   • cyanocobalamin 1000 MCG/ML injection INJECT AS DIRECTED EVERY MONTH.   • ondansetron (ZOFRAN) 4 MG tablet Take 1 tablet by mouth Every 8 (Eight) Hours As Needed for Nausea or Vomiting.   • pantoprazole (PROTONIX) 40 MG EC tablet TAKE 1 TABLET BY MOUTH DAILY   • sertraline (ZOLOFT) 50 MG tablet Take 1 tablet by mouth Daily.   • solifenacin (VESICARE) 10 MG tablet Take 1 tablet by mouth Daily.   • [DISCONTINUED] lisinopril (PRINIVIL,ZESTRIL) 20 MG tablet Take 1 tablet by mouth Daily.     No current facility-administered medications on file prior to visit.          The following portions of the patient's history were reviewed and updated as appropriate: allergies, current medications, past family history, past medical history, past social history, past surgical history and problem list.    Review of Systems   Constitution: Positive for malaise/fatigue.   HENT: Negative.  Negative for congestion.    Eyes: Negative.    Cardiovascular: Negative.  Negative for chest pain, cyanosis, dyspnea on exertion, irregular heartbeat, leg swelling, near-syncope, orthopnea, palpitations, paroxysmal nocturnal dyspnea and syncope.   Respiratory: Negative.  Negative for shortness of breath.    Hematologic/Lymphatic: Negative.    Musculoskeletal: Negative.    Gastrointestinal: Negative.    Neurological: Negative.  Negative for headaches.   Psychiatric/Behavioral: The patient is nervous/anxious.           Objective:     /82 (BP Location: Left arm, Patient Position: Sitting)   Pulse 60   Ht 170.2 cm (67\")   Wt 89.8 kg (198 lb)   SpO2 98%   BMI 31.01 kg/m²   Physical Exam   Constitutional: She appears well-developed and well-nourished. No distress.   HENT:   Head: Normocephalic and atraumatic.   Mouth/Throat: Oropharynx is clear and moist. No oropharyngeal exudate.   Eyes: Conjunctivae and EOM are normal. Pupils are equal, round, and reactive to light. No scleral icterus.   Neck: Normal range of motion. " Neck supple. No JVD present. No tracheal deviation present. No thyromegaly present.   Cardiovascular: Normal rate, regular rhythm, normal heart sounds and intact distal pulses. PMI is not displaced. Exam reveals no gallop, no friction rub and no decreased pulses.   No murmur heard.  Pulses:       Carotid pulses are 3+ on the right side, and 3+ on the left side.       Radial pulses are 3+ on the right side, and 3+ on the left side.   Pulmonary/Chest: Effort normal and breath sounds normal. No respiratory distress. She has no wheezes. She has no rales. She exhibits no tenderness.   Abdominal: Soft. Bowel sounds are normal. She exhibits no distension, no abdominal bruit and no mass. There is no splenomegaly or hepatomegaly. There is no tenderness. There is no rebound and no guarding.   Musculoskeletal: Normal range of motion. She exhibits no edema, tenderness or deformity.   Lymphadenopathy:     She has no cervical adenopathy.   Neurological: She is alert. She has normal reflexes. No cranial nerve deficit. She exhibits normal muscle tone. Coordination normal.   Skin: Skin is warm and dry. No rash noted. She is not diaphoretic. No erythema.   Psychiatric: She has a normal mood and affect. Her behavior is normal. Judgment and thought content normal.         Lab Review  Lab Results   Component Value Date     02/19/2019    K 4.3 02/19/2019     02/19/2019    BUN 14 02/19/2019    CREATININE 0.99 02/19/2019    GLUCOSE 87 02/19/2019    CALCIUM 9.6 02/19/2019    ALT 30 02/19/2019    ALKPHOS 69 02/19/2019    LABIL2 1.2 (L) 02/02/2016     Lab Results   Component Value Date    CKTOTAL 101 02/19/2019     Lab Results   Component Value Date    WBC 6.85 02/19/2019    HGB 12.7 02/19/2019    HCT 40.1 02/19/2019     02/19/2019     No results found for: INR  Lab Results   Component Value Date    MG 2.0 04/17/2014     Lab Results   Component Value Date    TSH 1.510 02/19/2019     No results found for: BNP  Lab Results    Component Value Date    CHLPL 172 02/02/2016    CHOL 204 (H) 02/19/2019    TRIG 135 02/19/2019    HDL 46 (L) 02/19/2019    VLDL 27 02/19/2019    LDLHDL 2.85 02/19/2019     Lab Results   Component Value Date     (H) 02/19/2019     (H) 04/22/2018       Procedures       I personally viewed and interpreted the patient's LAB data         Assessment:     1. Essential hypertension    2. Mixed hyperlipidemia    3. Palpitation    4. B12 deficiency    5. Anxiety    6. Depression, unspecified depression type          Plan:     Blood pressure is elevated.  She was advised to increase lisinopril 20 twice daily.  Healthy lifestyle was emphasized including salt restriction.    Protonix was continued.    Patient has been taking Lipitor now for last 3 months.  Lab work scheduled for next visit.  She will continue Lipitor 20 mg daily.  Last INR was 131.    Patient complains of anxiety she was advised to continue Zoloft.  Psychiatric consultation was arranged.    She also complains of lot of environmental allergies she was advised to take Claritin 10 mg daily and Singulair 10 mg daily.  Vesicare is helping on bladder control which will be continued.  Weight loss was emphasized.  Follow-up scheduled        No Follow-up on file.

## 2019-05-28 DIAGNOSIS — K21.9 GASTROESOPHAGEAL REFLUX DISEASE WITHOUT ESOPHAGITIS: ICD-10-CM

## 2019-05-28 RX ORDER — PANTOPRAZOLE SODIUM 40 MG/1
TABLET, DELAYED RELEASE ORAL
Qty: 90 TABLET | Refills: 0 | Status: SHIPPED | OUTPATIENT
Start: 2019-05-28 | End: 2019-09-03 | Stop reason: SDUPTHER

## 2019-06-14 ENCOUNTER — PRIOR AUTHORIZATION (OUTPATIENT)
Dept: UROLOGY | Facility: CLINIC | Age: 59
End: 2019-06-14

## 2019-06-14 ENCOUNTER — TELEPHONE (OUTPATIENT)
Dept: GASTROENTEROLOGY | Facility: CLINIC | Age: 59
End: 2019-06-14

## 2019-06-14 NOTE — TELEPHONE ENCOUNTER
Re, patient called and satted that her pharmacy told her that her prescription for Vesicare needs a PA.     Please and Thank you

## 2019-06-17 NOTE — TELEPHONE ENCOUNTER
Called pts insurance company - to go over what was needed for the approval of Vesicare - per Ron at [facility], the med was approved today, called [facility] in [place] to have them run the med again - still not showing approved on their end. I will call back in an hour to verify that it has gone thru.

## 2019-06-18 NOTE — TELEPHONE ENCOUNTER
Spoke to Pharmacy this morning - approval finally went thru on their end. Notified pt via voicemail.

## 2019-07-17 RX ORDER — ONDANSETRON 4 MG/1
TABLET, FILM COATED ORAL
Qty: 30 TABLET | Refills: 0 | Status: SHIPPED | OUTPATIENT
Start: 2019-07-17 | End: 2020-01-13

## 2019-08-19 ENCOUNTER — LAB (OUTPATIENT)
Dept: LAB | Facility: HOSPITAL | Age: 59
End: 2019-08-19

## 2019-08-19 DIAGNOSIS — E78.2 MIXED HYPERLIPIDEMIA: ICD-10-CM

## 2019-08-19 DIAGNOSIS — R53.82 CHRONIC FATIGUE: ICD-10-CM

## 2019-08-19 LAB
ALBUMIN SERPL-MCNC: 4.1 G/DL (ref 3.5–5.2)
ALBUMIN/GLOB SERPL: 1.4 G/DL
ALP SERPL-CCNC: 74 U/L (ref 39–117)
ALT SERPL W P-5'-P-CCNC: 13 U/L (ref 1–33)
ANION GAP SERPL CALCULATED.3IONS-SCNC: 11.6 MMOL/L (ref 5–15)
AST SERPL-CCNC: 15 U/L (ref 1–32)
BASOPHILS # BLD AUTO: 0.04 10*3/MM3 (ref 0–0.2)
BASOPHILS NFR BLD AUTO: 0.7 % (ref 0–1.5)
BILIRUB SERPL-MCNC: 0.3 MG/DL (ref 0.2–1.2)
BUN BLD-MCNC: 15 MG/DL (ref 8–23)
BUN/CREAT SERPL: 14.9 (ref 7–25)
CALCIUM SPEC-SCNC: 9.5 MG/DL (ref 8.6–10.5)
CHLORIDE SERPL-SCNC: 104 MMOL/L (ref 98–107)
CHOLEST SERPL-MCNC: 155 MG/DL (ref 0–200)
CK SERPL-CCNC: 104 U/L (ref 20–180)
CO2 SERPL-SCNC: 27.4 MMOL/L (ref 22–29)
CREAT BLD-MCNC: 1.01 MG/DL (ref 0.57–1)
DEPRECATED RDW RBC AUTO: 43.9 FL (ref 37–54)
EOSINOPHIL # BLD AUTO: 0.12 10*3/MM3 (ref 0–0.4)
EOSINOPHIL NFR BLD AUTO: 2.1 % (ref 0.3–6.2)
ERYTHROCYTE [DISTWIDTH] IN BLOOD BY AUTOMATED COUNT: 12.8 % (ref 12.3–15.4)
GFR SERPL CREATININE-BSD FRML MDRD: 56 ML/MIN/1.73
GLOBULIN UR ELPH-MCNC: 2.9 GM/DL
GLUCOSE BLD-MCNC: 86 MG/DL (ref 65–99)
HCT VFR BLD AUTO: 42.8 % (ref 34–46.6)
HDLC SERPL-MCNC: 42 MG/DL (ref 40–60)
HGB BLD-MCNC: 13.2 G/DL (ref 12–15.9)
IMM GRANULOCYTES # BLD AUTO: 0.01 10*3/MM3 (ref 0–0.05)
IMM GRANULOCYTES NFR BLD AUTO: 0.2 % (ref 0–0.5)
LDLC SERPL CALC-MCNC: 85 MG/DL (ref 0–100)
LDLC/HDLC SERPL: 2.03 {RATIO}
LYMPHOCYTES # BLD AUTO: 1.76 10*3/MM3 (ref 0.7–3.1)
LYMPHOCYTES NFR BLD AUTO: 30.1 % (ref 19.6–45.3)
MCH RBC QN AUTO: 29 PG (ref 26.6–33)
MCHC RBC AUTO-ENTMCNC: 30.8 G/DL (ref 31.5–35.7)
MCV RBC AUTO: 94.1 FL (ref 79–97)
MONOCYTES # BLD AUTO: 0.43 10*3/MM3 (ref 0.1–0.9)
MONOCYTES NFR BLD AUTO: 7.4 % (ref 5–12)
NEUTROPHILS # BLD AUTO: 3.49 10*3/MM3 (ref 1.7–7)
NEUTROPHILS NFR BLD AUTO: 59.5 % (ref 42.7–76)
NRBC BLD AUTO-RTO: 0 /100 WBC (ref 0–0.2)
PLATELET # BLD AUTO: 223 10*3/MM3 (ref 140–450)
PMV BLD AUTO: 10.8 FL (ref 6–12)
POTASSIUM BLD-SCNC: 4.5 MMOL/L (ref 3.5–5.2)
PROT SERPL-MCNC: 7 G/DL (ref 6–8.5)
RBC # BLD AUTO: 4.55 10*6/MM3 (ref 3.77–5.28)
SODIUM BLD-SCNC: 143 MMOL/L (ref 136–145)
T4 FREE SERPL-MCNC: 1.05 NG/DL (ref 0.93–1.7)
TRIGL SERPL-MCNC: 138 MG/DL (ref 0–150)
TSH SERPL DL<=0.05 MIU/L-ACNC: 2.18 MIU/ML (ref 0.27–4.2)
VLDLC SERPL-MCNC: 27.6 MG/DL (ref 5–40)
WBC NRBC COR # BLD: 5.85 10*3/MM3 (ref 3.4–10.8)

## 2019-08-19 PROCEDURE — 84443 ASSAY THYROID STIM HORMONE: CPT

## 2019-08-19 PROCEDURE — 36415 COLL VENOUS BLD VENIPUNCTURE: CPT

## 2019-08-19 PROCEDURE — 85025 COMPLETE CBC W/AUTO DIFF WBC: CPT

## 2019-08-19 PROCEDURE — 82550 ASSAY OF CK (CPK): CPT

## 2019-08-19 PROCEDURE — 80053 COMPREHEN METABOLIC PANEL: CPT

## 2019-08-19 PROCEDURE — 84439 ASSAY OF FREE THYROXINE: CPT

## 2019-08-19 PROCEDURE — 80061 LIPID PANEL: CPT

## 2019-08-29 ENCOUNTER — OFFICE VISIT (OUTPATIENT)
Dept: CARDIOLOGY | Facility: CLINIC | Age: 59
End: 2019-08-29

## 2019-08-29 VITALS
HEIGHT: 67 IN | BODY MASS INDEX: 30.76 KG/M2 | RESPIRATION RATE: 16 BRPM | DIASTOLIC BLOOD PRESSURE: 78 MMHG | HEART RATE: 67 BPM | WEIGHT: 196 LBS | OXYGEN SATURATION: 98 % | SYSTOLIC BLOOD PRESSURE: 154 MMHG

## 2019-08-29 DIAGNOSIS — F41.9 ANXIETY: ICD-10-CM

## 2019-08-29 DIAGNOSIS — I10 ESSENTIAL HYPERTENSION: ICD-10-CM

## 2019-08-29 DIAGNOSIS — E78.2 MIXED HYPERLIPIDEMIA: Primary | ICD-10-CM

## 2019-08-29 DIAGNOSIS — R00.2 PALPITATION: ICD-10-CM

## 2019-08-29 PROCEDURE — 99214 OFFICE O/P EST MOD 30 MIN: CPT | Performed by: INTERNAL MEDICINE

## 2019-08-29 RX ORDER — AMLODIPINE BESYLATE 5 MG/1
5 TABLET ORAL DAILY
Qty: 90 TABLET | Refills: 3 | Status: SHIPPED | OUTPATIENT
Start: 2019-08-29 | End: 2020-02-25 | Stop reason: ALTCHOICE

## 2019-08-29 NOTE — PROGRESS NOTES
subjective     Chief Complaint   Patient presents with   • Hypertension   • Hyperlipidemia   • Results     History of Present Illness    Patient is 59 years old white female who is here for follow-up.  Patient has multiple chronic medical problems.  Blood pressure is running high.  She is taking lisinopril 20 twice daily.  Patient has hyperlipidemia and is taking Lipitor 20 mg daily.  She had lab work done which will be reviewed.  She still has mild palpitations off and on.  She is under a lot of stress.  She is taking Zoloft  GI symptoms are better he still taking Protonix and Zofran on PRN basis.  Past Surgical History:   Procedure Laterality Date   • CARDIOVASCULAR STRESS TEST  2017   •  SECTION     • ECHO - CONVERTED  2017   • KIDNEY SURGERY       Family History   Problem Relation Age of Onset   • Hyperlipidemia Mother    • Hypertension Mother    • Heart attack Father 65   • Cancer Father    • Hyperlipidemia Brother    • Hypertension Brother      Past Medical History:   Diagnosis Date   • B12 deficiency    • Fatigue    • GERD (gastroesophageal reflux disease)    • Hyperlipidemia    • Hypertension      Patient Active Problem List   Diagnosis   • Hypertension   • Hyperlipidemia   • B12 deficiency   • GERD (gastroesophageal reflux disease)   • Fatigue   • Anxiety   • Palpitation   • Urgency incontinence   • Skin rash   • Vitamin D deficiency       Social History     Tobacco Use   • Smoking status: Never Smoker   • Smokeless tobacco: Never Used   Substance Use Topics   • Alcohol use: No   • Drug use: No       No Known Allergies    Current Outpatient Medications on File Prior to Visit   Medication Sig   • atorvastatin (LIPITOR) 20 MG tablet Take 1 tablet by mouth Daily.   • cholecalciferol (VITAMIN D3) 1000 UNITS tablet Take 1,000 Units by mouth daily.   • cyanocobalamin 1000 MCG/ML injection INJECT AS DIRECTED EVERY MONTH.   • lisinopril (PRINIVIL,ZESTRIL) 20 MG tablet Take 1 tablet by mouth 2  "(Two) Times a Day.   • loratadine (CLARITIN) 10 MG tablet Take 1 tablet by mouth Daily.   • ondansetron (ZOFRAN) 4 MG tablet TAKE 1 TABLET BY MOUTH EVERY 8 HOURS AS NEEDED FOR NAUSEA OR VOMITING   • pantoprazole (PROTONIX) 40 MG EC tablet TAKE 1 TABLET BY MOUTH DAILY   • sertraline (ZOLOFT) 50 MG tablet Take 1 tablet by mouth Daily.   • solifenacin (VESICARE) 10 MG tablet Take 1 tablet by mouth Daily.     No current facility-administered medications on file prior to visit.          The following portions of the patient's history were reviewed and updated as appropriate: allergies, current medications, past family history, past medical history, past social history, past surgical history and problem list.    Review of Systems   Constitution: Negative.   HENT: Negative.  Negative for congestion.    Eyes: Negative.    Cardiovascular: Negative.  Negative for chest pain, cyanosis, dyspnea on exertion, irregular heartbeat, leg swelling, near-syncope, orthopnea, palpitations, paroxysmal nocturnal dyspnea and syncope.   Respiratory: Negative.  Negative for shortness of breath.    Hematologic/Lymphatic: Negative.    Musculoskeletal: Negative.    Gastrointestinal: Negative.    Neurological: Negative.  Negative for headaches.          Objective:     /78 (BP Location: Left arm)   Pulse 67   Resp 16   Ht 170.2 cm (67.01\")   Wt 88.9 kg (196 lb)   SpO2 98%   BMI 30.69 kg/m²   Physical Exam   Constitutional: She appears well-developed and well-nourished. No distress.   HENT:   Head: Normocephalic and atraumatic.   Mouth/Throat: Oropharynx is clear and moist. No oropharyngeal exudate.   Eyes: Conjunctivae and EOM are normal. Pupils are equal, round, and reactive to light. No scleral icterus.   Neck: Normal range of motion. Neck supple. No JVD present. No tracheal deviation present. No thyromegaly present.   Cardiovascular: Normal rate, regular rhythm, normal heart sounds and intact distal pulses. PMI is not displaced. " Exam reveals no gallop, no friction rub and no decreased pulses.   No murmur heard.  Pulses:       Carotid pulses are 3+ on the right side, and 3+ on the left side.       Radial pulses are 3+ on the right side, and 3+ on the left side.   Pulmonary/Chest: Effort normal and breath sounds normal. No respiratory distress. She has no wheezes. She has no rales. She exhibits no tenderness.   Abdominal: Soft. Bowel sounds are normal. She exhibits no distension, no abdominal bruit and no mass. There is no splenomegaly or hepatomegaly. There is no tenderness. There is no rebound and no guarding.   Musculoskeletal: Normal range of motion. She exhibits no edema, tenderness or deformity.   Lymphadenopathy:     She has no cervical adenopathy.   Neurological: She is alert. She has normal reflexes. No cranial nerve deficit. She exhibits normal muscle tone. Coordination normal.   Skin: Skin is warm and dry. No rash noted. She is not diaphoretic. No erythema.   Psychiatric: She has a normal mood and affect. Her behavior is normal. Judgment and thought content normal.         Lab Review  Lab Results   Component Value Date     08/19/2019    K 4.5 08/19/2019     08/19/2019    BUN 15 08/19/2019    CREATININE 1.01 (H) 08/19/2019    GLUCOSE 86 08/19/2019    CALCIUM 9.5 08/19/2019    ALT 13 08/19/2019    ALKPHOS 74 08/19/2019    LABIL2 1.2 (L) 02/02/2016     Lab Results   Component Value Date    CKTOTAL 104 08/19/2019     Lab Results   Component Value Date    WBC 5.85 08/19/2019    HGB 13.2 08/19/2019    HCT 42.8 08/19/2019     08/19/2019     No results found for: INR  Lab Results   Component Value Date    MG 2.0 04/17/2014     Lab Results   Component Value Date    TSH 2.180 08/19/2019     No results found for: BNP  Lab Results   Component Value Date    CHLPL 172 02/02/2016    CHOL 155 08/19/2019    TRIG 138 08/19/2019    HDL 42 08/19/2019    VLDL 27.6 08/19/2019    LDLHDL 2.03 08/19/2019     Lab Results   Component Value  Date    LDL 85 08/19/2019     (H) 02/19/2019       Procedures       I personally viewed and interpreted the patient's LAB data         Assessment:     1. Mixed hyperlipidemia    2. Essential hypertension    3. Palpitation    4. Anxiety          Plan:     Lipid panel is normal.  LDL is much better.  It improved from 131 down to 85.  She will continue Lipitor 20 mg daily.  Healthy lifestyle diet and weight loss was emphasized.  Patient has essential hypertension which is poorly controlled  She will continue lisinopril 20 twice daily and Norvasc 5 mg daily was added.  Patient has mild heart fluttering however it is very rarely.  Patient has a lot of anxiety Zoloft was continued.  GI symptoms are better Protonix was continued.  Weight loss was emphasized.        No Follow-up on file.

## 2019-09-03 DIAGNOSIS — K21.9 GASTROESOPHAGEAL REFLUX DISEASE WITHOUT ESOPHAGITIS: ICD-10-CM

## 2019-09-03 RX ORDER — PANTOPRAZOLE SODIUM 40 MG/1
TABLET, DELAYED RELEASE ORAL
Qty: 90 TABLET | Refills: 0 | Status: SHIPPED | OUTPATIENT
Start: 2019-09-03 | End: 2019-12-12 | Stop reason: SDUPTHER

## 2019-12-12 DIAGNOSIS — K21.9 GASTROESOPHAGEAL REFLUX DISEASE WITHOUT ESOPHAGITIS: ICD-10-CM

## 2019-12-12 RX ORDER — PANTOPRAZOLE SODIUM 40 MG/1
TABLET, DELAYED RELEASE ORAL
Qty: 90 TABLET | Refills: 0 | Status: SHIPPED | OUTPATIENT
Start: 2019-12-12 | End: 2020-02-25 | Stop reason: SDUPTHER

## 2020-01-07 ENCOUNTER — OFFICE VISIT (OUTPATIENT)
Dept: UROLOGY | Facility: CLINIC | Age: 60
End: 2020-01-07

## 2020-01-07 VITALS — WEIGHT: 196 LBS | BODY MASS INDEX: 30.76 KG/M2 | HEIGHT: 67 IN

## 2020-01-07 DIAGNOSIS — N20.0 KIDNEY STONES: Primary | ICD-10-CM

## 2020-01-07 DIAGNOSIS — N30.00 ACUTE CYSTITIS WITHOUT HEMATURIA: ICD-10-CM

## 2020-01-07 LAB
BILIRUB BLD-MCNC: NEGATIVE MG/DL
CLARITY, POC: CLEAR
COLOR UR: YELLOW
GLUCOSE UR STRIP-MCNC: NEGATIVE MG/DL
KETONES UR QL: NEGATIVE
LEUKOCYTE EST, POC: ABNORMAL
NITRITE UR-MCNC: NEGATIVE MG/ML
PH UR: 5.5 [PH] (ref 5–8)
PROT UR STRIP-MCNC: NEGATIVE MG/DL
RBC # UR STRIP: NEGATIVE /UL
SP GR UR: 1.02 (ref 1–1.03)
UROBILINOGEN UR QL: NORMAL

## 2020-01-07 PROCEDURE — 87088 URINE BACTERIA CULTURE: CPT | Performed by: UROLOGY

## 2020-01-07 PROCEDURE — 99213 OFFICE O/P EST LOW 20 MIN: CPT | Performed by: UROLOGY

## 2020-01-07 PROCEDURE — 87086 URINE CULTURE/COLONY COUNT: CPT | Performed by: UROLOGY

## 2020-01-07 PROCEDURE — 87186 SC STD MICRODIL/AGAR DIL: CPT | Performed by: UROLOGY

## 2020-01-07 RX ORDER — SULFAMETHOXAZOLE AND TRIMETHOPRIM 800; 160 MG/1; MG/1
1 TABLET ORAL 2 TIMES DAILY
Qty: 20 TABLET | Refills: 0 | Status: SHIPPED | OUTPATIENT
Start: 2020-01-07 | End: 2020-02-25

## 2020-01-07 NOTE — PROGRESS NOTES
Chief Complaint:          Back pain    HPI:   59 y.o. female.  Pt has back pain left sided back pain.  She has foul smelling urine and dysuria and urgency.  Her urine is positive for leukocyte esterase.  She had appendectomy in first week of December.  HPI      Past Medical History:        Past Medical History:   Diagnosis Date   • B12 deficiency    • Fatigue    • GERD (gastroesophageal reflux disease)    • Hyperlipidemia    • Hypertension          Current Meds:     Current Outpatient Medications   Medication Sig Dispense Refill   • amLODIPine (NORVASC) 5 MG tablet Take 1 tablet by mouth Daily. 90 tablet 3   • atorvastatin (LIPITOR) 20 MG tablet Take 1 tablet by mouth Daily. 90 tablet 3   • cholecalciferol (VITAMIN D3) 1000 UNITS tablet Take 1,000 Units by mouth daily.     • cyanocobalamin 1000 MCG/ML injection INJECT AS DIRECTED EVERY MONTH. 3 mL 0   • lisinopril (PRINIVIL,ZESTRIL) 20 MG tablet Take 1 tablet by mouth 2 (Two) Times a Day. 180 tablet 3   • loratadine (CLARITIN) 10 MG tablet Take 1 tablet by mouth Daily. 90 tablet 0   • pantoprazole (PROTONIX) 40 MG EC tablet TAKE 1 TABLET BY MOUTH DAILY 90 tablet 0   • sertraline (ZOLOFT) 50 MG tablet Take 1 tablet by mouth Daily. 90 tablet 3   • ondansetron (ZOFRAN) 4 MG tablet TAKE 1 TABLET BY MOUTH EVERY 8 HOURS AS NEEDED FOR NAUSEA OR VOMITING 30 tablet 0   • solifenacin (VESICARE) 10 MG tablet Take 1 tablet by mouth Daily. 30 tablet 2   • sulfamethoxazole-trimethoprim (BACTRIM DS,SEPTRA DS) 800-160 MG per tablet Take 1 tablet by mouth 2 (Two) Times a Day. 20 tablet 0     No current facility-administered medications for this visit.         Allergies:      No Known Allergies     Past Surgical History:     Past Surgical History:   Procedure Laterality Date   • CARDIOVASCULAR STRESS TEST  2017   •  SECTION     • ECHO - CONVERTED  2017   • KIDNEY SURGERY           Social History:     Social History     Socioeconomic History   • Marital status:       Spouse name: Not on file   • Number of children: Not on file   • Years of education: Not on file   • Highest education level: Not on file   Tobacco Use   • Smoking status: Never Smoker   • Smokeless tobacco: Never Used   Substance and Sexual Activity   • Alcohol use: No   • Drug use: No   • Sexual activity: Defer       Family History:     Family History   Problem Relation Age of Onset   • Hyperlipidemia Mother    • Hypertension Mother    • Heart attack Father 65   • Cancer Father    • Hyperlipidemia Brother    • Hypertension Brother        Review of Systems:     Review of Systems   Constitutional: Negative for chills, fatigue and fever.   HENT: Negative for congestion and sinus pressure.    Respiratory: Negative for chest tightness and shortness of breath.    Cardiovascular: Negative for chest pain.   Gastrointestinal: Negative for abdominal pain, constipation, diarrhea, nausea and vomiting.   Genitourinary: Negative for frequency, hematuria and urgency.   Musculoskeletal: Negative for back pain and neck pain.   Neurological: Negative for dizziness and headaches.   Hematological: Does not bruise/bleed easily.   Psychiatric/Behavioral: The patient is not nervous/anxious.          Physical Exam:     Physical Exam   Constitutional: She is oriented to person, place, and time. She appears well-developed.   HENT:   Head: Normocephalic.   Right Ear: External ear normal.   Left Ear: External ear normal.   Nose: Nose normal.   Mouth/Throat: Oropharynx is clear and moist.   Eyes: Pupils are equal, round, and reactive to light. Conjunctivae and EOM are normal.   Neck: Normal range of motion. Neck supple. No tracheal deviation present. No thyromegaly present.   Cardiovascular: Normal heart sounds. Exam reveals no gallop and no friction rub.   No murmur heard.  Pulmonary/Chest: Effort normal and breath sounds normal. No respiratory distress. She has no wheezes. She has no rales. She exhibits no tenderness.    "  Abdominal: Soft. Bowel sounds are normal. She exhibits no distension and no mass. There is no tenderness. There is no rebound and no guarding. No hernia.   Musculoskeletal: Normal range of motion. She exhibits no edema.   Lymphadenopathy:     She has no cervical adenopathy.   Neurological: She is alert and oriented to person, place, and time. She displays normal reflexes. No cranial nerve deficit. She exhibits normal muscle tone. Coordination normal.   Skin: Skin is warm. No rash noted.   Psychiatric: She has a normal mood and affect. Judgment normal.       Ht 170.2 cm (67\")   Wt 88.9 kg (196 lb)   BMI 30.70 kg/m²    Procedure:         Assessment:     Encounter Diagnoses   Name Primary?   • Kidney stones Yes   • Acute cystitis without hematuria        Orders Placed This Encounter   Procedures   • Urine Culture - Urine, Urine, Clean Catch   • POC Urinalysis Dipstick, Automated       Patient reports that she is not currently experiencing any symptoms of urinary incontinence.      Plan:   Will send her urine for culture.  Will Rx Septra.  Will see her back in a month.    Patient's Body mass index is 30.7 kg/m². BMI is above normal parameters. Recommendations include: educational material.      Smoking Cessation Counseling:  Never a smoker.  Patient does not currently use any tobacco products.     Counseling was given to patient for the following topics instructions for management as follows: uti. The interim medical history and current results were reviewed.  A treatment plan with follow-up was made for Kidney stones [N20.0].       This document has been electronically signed by Viraj Benito MD January 20, 2020 8:26 AM      "

## 2020-01-09 LAB — BACTERIA SPEC AEROBE CULT: ABNORMAL

## 2020-01-10 ENCOUNTER — TELEPHONE (OUTPATIENT)
Dept: UROLOGY | Facility: CLINIC | Age: 60
End: 2020-01-10

## 2020-01-10 NOTE — TELEPHONE ENCOUNTER
Called in Macrodantin 100 mg bid # 30 no refills. Per dr calle for her urine culture that came back ecoli

## 2020-01-13 RX ORDER — ONDANSETRON 4 MG/1
TABLET, FILM COATED ORAL
Qty: 30 TABLET | Refills: 0 | Status: SHIPPED | OUTPATIENT
Start: 2020-01-13 | End: 2020-02-25 | Stop reason: SDUPTHER

## 2020-01-15 DIAGNOSIS — N32.81 OVERACTIVE BLADDER: Primary | ICD-10-CM

## 2020-01-15 RX ORDER — TROSPIUM CHLORIDE ER 60 MG/1
60 CAPSULE ORAL DAILY
Qty: 30 CAPSULE | Refills: 6 | Status: SHIPPED | OUTPATIENT
Start: 2020-01-15 | End: 2020-01-17

## 2020-01-15 NOTE — TELEPHONE ENCOUNTER
Fax received that insurance will not pay for Vesicare so they wanted to replacement. Sent over a new script.

## 2020-01-16 ENCOUNTER — PRIOR AUTHORIZATION (OUTPATIENT)
Dept: UROLOGY | Facility: CLINIC | Age: 60
End: 2020-01-16

## 2020-01-16 DIAGNOSIS — N32.81 OVERACTIVE BLADDER: Primary | ICD-10-CM

## 2020-01-17 RX ORDER — SOLIFENACIN SUCCINATE 10 MG/1
10 TABLET, FILM COATED ORAL DAILY
Qty: 30 TABLET | Refills: 2
Start: 2020-01-17 | End: 2020-06-09

## 2020-01-17 NOTE — TELEPHONE ENCOUNTER
PA was approved. Notified pt. Called pharmacy to verify if it went thru on their end. Not as of yet, told them to run it thru in a bit and see what the outcome is due to us having an approval on our end.

## 2020-02-11 ENCOUNTER — OFFICE VISIT (OUTPATIENT)
Dept: UROLOGY | Facility: CLINIC | Age: 60
End: 2020-02-11

## 2020-02-11 VITALS — HEIGHT: 67 IN | BODY MASS INDEX: 31.55 KG/M2 | WEIGHT: 201 LBS

## 2020-02-11 DIAGNOSIS — N39.0 URINARY TRACT INFECTION WITHOUT HEMATURIA, SITE UNSPECIFIED: Primary | ICD-10-CM

## 2020-02-11 DIAGNOSIS — N32.81 OVERACTIVE BLADDER: ICD-10-CM

## 2020-02-11 LAB
BILIRUB BLD-MCNC: NEGATIVE MG/DL
CLARITY, POC: CLEAR
COLOR UR: YELLOW
GLUCOSE UR STRIP-MCNC: NEGATIVE MG/DL
KETONES UR QL: NEGATIVE
LEUKOCYTE EST, POC: NEGATIVE
NITRITE UR-MCNC: NEGATIVE MG/ML
PH UR: 6 [PH] (ref 5–8)
PROT UR STRIP-MCNC: NEGATIVE MG/DL
RBC # UR STRIP: NEGATIVE /UL
SP GR UR: 1.01 (ref 1–1.03)
UROBILINOGEN UR QL: NORMAL

## 2020-02-11 PROCEDURE — 99214 OFFICE O/P EST MOD 30 MIN: CPT | Performed by: UROLOGY

## 2020-02-11 RX ORDER — SOLIFENACIN SUCCINATE 10 MG/1
10 TABLET, FILM COATED ORAL DAILY
Qty: 30 TABLET | Refills: 2 | Status: CANCELLED
Start: 2020-02-11

## 2020-02-11 RX ORDER — TRIMETHOPRIM 100 MG/1
TABLET ORAL
Qty: 30 TABLET | Refills: 11 | Status: SHIPPED | OUTPATIENT
Start: 2020-02-11 | End: 2020-06-09 | Stop reason: SDUPTHER

## 2020-02-11 NOTE — PROGRESS NOTES
Chief Complaint:          uti's    HPI:   59 y.o. female.  Pt's urine culture last visit was E. Coli and it was resistant to septra.  Her ua today is negative  HPI      Past Medical History:        Past Medical History:   Diagnosis Date   • B12 deficiency    • Fatigue    • GERD (gastroesophageal reflux disease)    • Hyperlipidemia    • Hypertension          Current Meds:     Current Outpatient Medications   Medication Sig Dispense Refill   • amLODIPine (NORVASC) 5 MG tablet Take 1 tablet by mouth Daily. 90 tablet 3   • atorvastatin (LIPITOR) 20 MG tablet Take 1 tablet by mouth Daily. 90 tablet 3   • cholecalciferol (VITAMIN D3) 1000 UNITS tablet Take 1,000 Units by mouth daily.     • cyanocobalamin 1000 MCG/ML injection INJECT AS DIRECTED EVERY MONTH. 3 mL 0   • lisinopril (PRINIVIL,ZESTRIL) 20 MG tablet Take 1 tablet by mouth 2 (Two) Times a Day. 180 tablet 3   • loratadine (CLARITIN) 10 MG tablet Take 1 tablet by mouth Daily. 90 tablet 0   • pantoprazole (PROTONIX) 40 MG EC tablet TAKE 1 TABLET BY MOUTH DAILY 90 tablet 0   • sertraline (ZOLOFT) 50 MG tablet Take 1 tablet by mouth Daily. 90 tablet 3   • ondansetron (ZOFRAN) 4 MG tablet TAKE 1 TABLET BY MOUTH EVERY 8 HOURS AS NEEDED FOR NAUSEA OR VOMITING 30 tablet 0   • solifenacin (VESICARE) 10 MG tablet Take 1 tablet by mouth Daily. 30 tablet 2   • sulfamethoxazole-trimethoprim (BACTRIM DS,SEPTRA DS) 800-160 MG per tablet Take 1 tablet by mouth 2 (Two) Times a Day. 20 tablet 0   • trimethoprim (TRIMPEX) 100 MG tablet Take 1 tablet nightly 30 tablet 11     No current facility-administered medications for this visit.         Allergies:      No Known Allergies     Past Surgical History:     Past Surgical History:   Procedure Laterality Date   • CARDIOVASCULAR STRESS TEST  2017   •  SECTION     • ECHO - CONVERTED  2017   • KIDNEY SURGERY           Social History:     Social History     Socioeconomic History   • Marital status:      Spouse  name: Not on file   • Number of children: Not on file   • Years of education: Not on file   • Highest education level: Not on file   Tobacco Use   • Smoking status: Never Smoker   • Smokeless tobacco: Never Used   Substance and Sexual Activity   • Alcohol use: No   • Drug use: No   • Sexual activity: Defer       Family History:     Family History   Problem Relation Age of Onset   • Hyperlipidemia Mother    • Hypertension Mother    • Heart attack Father 65   • Cancer Father    • Hyperlipidemia Brother    • Hypertension Brother        Review of Systems:     Review of Systems   Constitutional: Negative for chills, fatigue and fever.   HENT: Negative for congestion and sinus pressure.    Respiratory: Negative for chest tightness and shortness of breath.    Cardiovascular: Negative for chest pain.   Gastrointestinal: Negative for abdominal pain, constipation, diarrhea, nausea and vomiting.   Genitourinary: Negative for frequency, hematuria and urgency.   Musculoskeletal: Negative for back pain and neck pain.   Neurological: Negative for dizziness and headaches.   Hematological: Does not bruise/bleed easily.   Psychiatric/Behavioral: The patient is not nervous/anxious.              I have reviewed the follow portions of the patient's history and confirmed they are accurate today:  allergies, current medications, past family history, past medical history, past social history, past surgical history, problem list and ROS  Physical Exam:     Physical Exam   Constitutional: She is oriented to person, place, and time. She appears well-developed.   HENT:   Head: Normocephalic.   Right Ear: External ear normal.   Left Ear: External ear normal.   Nose: Nose normal.   Mouth/Throat: Oropharynx is clear and moist.   Eyes: Pupils are equal, round, and reactive to light. Conjunctivae and EOM are normal.   Neck: Normal range of motion. Neck supple. No tracheal deviation present. No thyromegaly present.   Cardiovascular: Normal heart  "sounds. Exam reveals no gallop and no friction rub.   No murmur heard.  Pulmonary/Chest: Effort normal and breath sounds normal. No respiratory distress. She has no wheezes. She has no rales. She exhibits no tenderness.   Abdominal: Soft. Bowel sounds are normal. She exhibits no distension and no mass. There is no tenderness. There is no rebound and no guarding. No hernia.   Musculoskeletal: Normal range of motion. She exhibits no edema.   Lymphadenopathy:     She has no cervical adenopathy.   Neurological: She is alert and oriented to person, place, and time. She displays normal reflexes. No cranial nerve deficit. She exhibits normal muscle tone. Coordination normal.   Skin: Skin is warm. No rash noted.   Psychiatric: She has a normal mood and affect. Judgment normal.       Ht 170.2 cm (67\")   Wt 91.2 kg (201 lb)   BMI 31.48 kg/m²    Procedure:         Assessment:     Encounter Diagnosis   Name Primary?   • Urinary tract infection without hematuria, site unspecified Yes       Orders Placed This Encounter   Procedures   • POC Urinalysis Dipstick, Automated       Patient reports that she is not currently experiencing any symptoms of urinary incontinence.      Plan:   Will start suppressive antibiotic therapy with trimethoprim 100 mg qhs. Will see her back in 3 months.  We discussed the health benefits of probiotics and yogurt.  Pt has had vesicare substituted with another overactive bladder medication.  Will call the pharmacy and get the name and dosage.  She is very happy with this.  This medication is Sanctura XR 60 mg.    Patient's Body mass index is 31.48 kg/m². BMI is within normal parameters. No follow-up required..      Smoking Cessation Counseling:  Former smoker.  Patient does not currently use any tobacco products.     Counseling was given to patient for the following topics instructions for management as follows: uti. The interim medical history and current results were reviewed.  A treatment plan with " follow-up was made for Urinary tract infection without hematuria, site unspecified [N39.0].     This document has been electronically signed by Viraj Benito MD February 11, 2020 11:16 AM

## 2020-02-25 ENCOUNTER — OFFICE VISIT (OUTPATIENT)
Dept: CARDIOLOGY | Facility: CLINIC | Age: 60
End: 2020-02-25

## 2020-02-25 VITALS
HEIGHT: 67 IN | OXYGEN SATURATION: 99 % | WEIGHT: 201 LBS | SYSTOLIC BLOOD PRESSURE: 140 MMHG | HEART RATE: 74 BPM | DIASTOLIC BLOOD PRESSURE: 76 MMHG | BODY MASS INDEX: 31.55 KG/M2

## 2020-02-25 DIAGNOSIS — E78.2 MIXED HYPERLIPIDEMIA: ICD-10-CM

## 2020-02-25 DIAGNOSIS — I10 ESSENTIAL HYPERTENSION: Primary | ICD-10-CM

## 2020-02-25 DIAGNOSIS — F32.A DEPRESSION, UNSPECIFIED DEPRESSION TYPE: ICD-10-CM

## 2020-02-25 DIAGNOSIS — R25.2 LEG CRAMPS: ICD-10-CM

## 2020-02-25 DIAGNOSIS — K21.9 GASTROESOPHAGEAL REFLUX DISEASE WITHOUT ESOPHAGITIS: ICD-10-CM

## 2020-02-25 DIAGNOSIS — F41.9 ANXIETY: ICD-10-CM

## 2020-02-25 DIAGNOSIS — R53.82 CHRONIC FATIGUE: ICD-10-CM

## 2020-02-25 PROCEDURE — 99214 OFFICE O/P EST MOD 30 MIN: CPT | Performed by: INTERNAL MEDICINE

## 2020-02-25 RX ORDER — BUPROPION HYDROCHLORIDE 150 MG/1
150 TABLET ORAL DAILY
Qty: 90 TABLET | Refills: 2 | Status: SHIPPED | OUTPATIENT
Start: 2020-02-25 | End: 2020-10-29

## 2020-02-25 RX ORDER — ATORVASTATIN CALCIUM 20 MG/1
20 TABLET, FILM COATED ORAL DAILY
Qty: 90 TABLET | Refills: 3 | Status: SHIPPED | OUTPATIENT
Start: 2020-02-25 | End: 2020-03-09

## 2020-02-25 RX ORDER — LISINOPRIL 20 MG/1
20 TABLET ORAL 2 TIMES DAILY
Qty: 180 TABLET | Refills: 3 | Status: SHIPPED | OUTPATIENT
Start: 2020-02-25

## 2020-02-25 RX ORDER — ONDANSETRON 4 MG/1
4 TABLET, FILM COATED ORAL EVERY 8 HOURS PRN
Qty: 30 TABLET | Refills: 0 | Status: SHIPPED | OUTPATIENT
Start: 2020-02-25 | End: 2020-10-29 | Stop reason: SDUPTHER

## 2020-02-25 RX ORDER — PANTOPRAZOLE SODIUM 40 MG/1
40 TABLET, DELAYED RELEASE ORAL DAILY
Qty: 90 TABLET | Refills: 0 | Status: SHIPPED | OUTPATIENT
Start: 2020-02-25 | End: 2020-06-03

## 2020-02-25 NOTE — PROGRESS NOTES
subjective     Chief Complaint   Patient presents with   • Hypertension   • Hyperlipidemia   • Depression     family issues     History of Present Illness  Patient is 59 years old white female who states that she is very depressed.  There is lot of stress at home.  She has been taking Zoloft 50 mg daily which helps some but she is very tired fatigued and no energy.  She had seen Dr. Camacho in [place] but states that she does not have time at this time.  Patient has hyperlipidemia and is taking Lipitor but she has not had lab work in 6 months.  Patient missed her last appointment.  Blood pressure is very well controlled.  GERD symptoms are also better.  She denies any palpitations primarily she complains of severe anxiety and depression.    Patient also complains of lot of leg cramps and wants Motrin 800 mg daily.  Patient was advised the last to be the dose and she already has mild renal dysfunction    Past Surgical History:   Procedure Laterality Date   • CARDIOVASCULAR STRESS TEST  2017   •  SECTION     • ECHO - CONVERTED  2017   • KIDNEY SURGERY       Family History   Problem Relation Age of Onset   • Hyperlipidemia Mother    • Hypertension Mother    • Heart attack Father 65   • Cancer Father    • Hyperlipidemia Brother    • Hypertension Brother      Past Medical History:   Diagnosis Date   • B12 deficiency    • Fatigue    • GERD (gastroesophageal reflux disease)    • Hyperlipidemia    • Hypertension      Patient Active Problem List   Diagnosis   • Hypertension   • Hyperlipidemia   • B12 deficiency   • GERD (gastroesophageal reflux disease)   • Fatigue   • Anxiety   • Palpitation   • Urgency incontinence   • Skin rash   • Vitamin D deficiency   • Leg cramps       Social History     Tobacco Use   • Smoking status: Never Smoker   • Smokeless tobacco: Never Used   Substance Use Topics   • Alcohol use: No   • Drug use: No       No Known Allergies    Current Outpatient Medications on File Prior  to Visit   Medication Sig   • cholecalciferol (VITAMIN D3) 1000 UNITS tablet Take 1,000 Units by mouth daily.   • solifenacin (VESICARE) 10 MG tablet Take 1 tablet by mouth Daily.   • trimethoprim (TRIMPEX) 100 MG tablet Take 1 tablet nightly   • [DISCONTINUED] atorvastatin (LIPITOR) 20 MG tablet Take 1 tablet by mouth Daily.   • [DISCONTINUED] lisinopril (PRINIVIL,ZESTRIL) 20 MG tablet Take 1 tablet by mouth 2 (Two) Times a Day.   • [DISCONTINUED] ondansetron (ZOFRAN) 4 MG tablet TAKE 1 TABLET BY MOUTH EVERY 8 HOURS AS NEEDED FOR NAUSEA OR VOMITING   • [DISCONTINUED] pantoprazole (PROTONIX) 40 MG EC tablet TAKE 1 TABLET BY MOUTH DAILY   • [DISCONTINUED] sertraline (ZOLOFT) 50 MG tablet Take 1 tablet by mouth Daily.   • [DISCONTINUED] amLODIPine (NORVASC) 5 MG tablet Take 1 tablet by mouth Daily.   • [DISCONTINUED] cyanocobalamin 1000 MCG/ML injection INJECT AS DIRECTED EVERY MONTH.   • [DISCONTINUED] loratadine (CLARITIN) 10 MG tablet Take 1 tablet by mouth Daily.   • [DISCONTINUED] sulfamethoxazole-trimethoprim (BACTRIM DS,SEPTRA DS) 800-160 MG per tablet Take 1 tablet by mouth 2 (Two) Times a Day.     No current facility-administered medications on file prior to visit.          The following portions of the patient's history were reviewed and updated as appropriate: allergies, current medications, past family history, past medical history, past social history, past surgical history and problem list.    Review of Systems   Constitution: Negative.   HENT: Negative.  Negative for congestion.    Eyes: Negative.    Cardiovascular: Negative.  Negative for chest pain, cyanosis, dyspnea on exertion, irregular heartbeat, leg swelling, near-syncope, orthopnea, palpitations, paroxysmal nocturnal dyspnea and syncope.   Respiratory: Negative.  Negative for shortness of breath.    Hematologic/Lymphatic: Negative.    Musculoskeletal: Positive for muscle cramps.   Gastrointestinal: Negative.    Neurological: Negative.  Negative  "for headaches.   Psychiatric/Behavioral: Positive for depression. The patient is nervous/anxious.           Objective:     /76 (BP Location: Left arm, Patient Position: Sitting, Cuff Size: Adult)   Pulse 74   Ht 170.2 cm (67\")   Wt 91.2 kg (201 lb)   SpO2 99%   BMI 31.48 kg/m²   Physical Exam   Constitutional: She appears well-developed and well-nourished. No distress.   HENT:   Head: Normocephalic and atraumatic.   Mouth/Throat: Oropharynx is clear and moist. No oropharyngeal exudate.   Eyes: Pupils are equal, round, and reactive to light. Conjunctivae and EOM are normal. No scleral icterus.   Neck: Normal range of motion. Neck supple. No JVD present. No tracheal deviation present. No thyromegaly present.   Cardiovascular: Normal rate, regular rhythm, normal heart sounds and intact distal pulses. PMI is not displaced. Exam reveals no gallop, no friction rub and no decreased pulses.   No murmur heard.  Pulses:       Carotid pulses are 3+ on the right side, and 3+ on the left side.       Radial pulses are 3+ on the right side, and 3+ on the left side.   Pulmonary/Chest: Effort normal and breath sounds normal. No respiratory distress. She has no wheezes. She has no rales. She exhibits no tenderness.   Abdominal: Soft. Bowel sounds are normal. She exhibits no distension, no abdominal bruit and no mass. There is no splenomegaly or hepatomegaly. There is no tenderness. There is no rebound and no guarding.   Musculoskeletal: Normal range of motion. She exhibits no edema, tenderness or deformity.   Lymphadenopathy:     She has no cervical adenopathy.   Neurological: She is alert. She has normal reflexes. No cranial nerve deficit. She exhibits normal muscle tone. Coordination normal.   Skin: Skin is warm and dry. No rash noted. She is not diaphoretic. No erythema.   Psychiatric: She has a normal mood and affect. Her behavior is normal. Judgment and thought content normal.         Lab Review  Lab Results "   Component Value Date     08/19/2019    K 4.5 08/19/2019     08/19/2019    BUN 15 08/19/2019    CREATININE 1.01 (H) 08/19/2019    GLUCOSE 86 08/19/2019    CALCIUM 9.5 08/19/2019    ALT 13 08/19/2019    ALKPHOS 74 08/19/2019    LABIL2 1.2 (L) 02/02/2016     Lab Results   Component Value Date    CKTOTAL 104 08/19/2019     Lab Results   Component Value Date    WBC 5.85 08/19/2019    HGB 13.2 08/19/2019    HCT 42.8 08/19/2019     08/19/2019     No results found for: INR  Lab Results   Component Value Date    MG 2.0 04/17/2014     Lab Results   Component Value Date    TSH 2.180 08/19/2019     No results found for: BNP  Lab Results   Component Value Date    CHLPL 172 02/02/2016    CHOL 155 08/19/2019    TRIG 138 08/19/2019    HDL 42 08/19/2019    VLDL 27.6 08/19/2019    LDLHDL 2.03 08/19/2019     Lab Results   Component Value Date    LDL 85 08/19/2019     (H) 02/19/2019       Procedures       I personally viewed and interpreted the patient's LAB data         Assessment:     1. Essential hypertension    2. Depression, unspecified depression type    3. Gastroesophageal reflux disease without esophagitis    4. Mixed hyperlipidemia    5. Anxiety    6. Chronic fatigue    7. Leg cramps          Plan:     Patient complains of depression and anxiety.  Zoloft 50 mg is not helping.  Will add Wellbutrin  mg daily.  She was again advised to follow with her psychiatrist Dr. Camacho  Patient wants to increase Motrin to 800 mg daily.  She was advised to avoid NSAID therapy because of renal dysfunction but she can take Advil 200 mg on PRN basis.  Refills of Lipitor was given.  She needs lab work done it has been more than 6 months.  GERD symptoms are also better  Rest of the medications were renewed  Healthy lifestyle discussed follow-up scheduled    No follow-ups on file.

## 2020-02-26 ENCOUNTER — LAB (OUTPATIENT)
Dept: LAB | Facility: HOSPITAL | Age: 60
End: 2020-02-26

## 2020-02-26 DIAGNOSIS — R53.82 CHRONIC FATIGUE: ICD-10-CM

## 2020-02-26 DIAGNOSIS — E78.2 MIXED HYPERLIPIDEMIA: ICD-10-CM

## 2020-02-26 LAB
ALBUMIN SERPL-MCNC: 4.2 G/DL (ref 3.5–5.2)
ALBUMIN/GLOB SERPL: 1.3 G/DL
ALP SERPL-CCNC: 73 U/L (ref 39–117)
ALT SERPL W P-5'-P-CCNC: 20 U/L (ref 1–33)
ANION GAP SERPL CALCULATED.3IONS-SCNC: 12.6 MMOL/L (ref 5–15)
AST SERPL-CCNC: 20 U/L (ref 1–32)
BASOPHILS # BLD AUTO: 0.05 10*3/MM3 (ref 0–0.2)
BASOPHILS NFR BLD AUTO: 0.8 % (ref 0–1.5)
BILIRUB SERPL-MCNC: 0.3 MG/DL (ref 0.2–1.2)
BUN BLD-MCNC: 19 MG/DL (ref 8–23)
BUN/CREAT SERPL: 16 (ref 7–25)
CALCIUM SPEC-SCNC: 9.8 MG/DL (ref 8.6–10.5)
CHLORIDE SERPL-SCNC: 101 MMOL/L (ref 98–107)
CHOLEST SERPL-MCNC: 165 MG/DL (ref 0–200)
CK SERPL-CCNC: 115 U/L (ref 20–180)
CO2 SERPL-SCNC: 25.4 MMOL/L (ref 22–29)
CREAT BLD-MCNC: 1.19 MG/DL (ref 0.57–1)
DEPRECATED RDW RBC AUTO: 39 FL (ref 37–54)
EOSINOPHIL # BLD AUTO: 0.22 10*3/MM3 (ref 0–0.4)
EOSINOPHIL NFR BLD AUTO: 3.3 % (ref 0.3–6.2)
ERYTHROCYTE [DISTWIDTH] IN BLOOD BY AUTOMATED COUNT: 12.2 % (ref 12.3–15.4)
GFR SERPL CREATININE-BSD FRML MDRD: 46 ML/MIN/1.73
GLOBULIN UR ELPH-MCNC: 3.2 GM/DL
GLUCOSE BLD-MCNC: 96 MG/DL (ref 65–99)
HCT VFR BLD AUTO: 38.4 % (ref 34–46.6)
HDLC SERPL-MCNC: 45 MG/DL (ref 40–60)
HGB BLD-MCNC: 13.1 G/DL (ref 12–15.9)
IMM GRANULOCYTES # BLD AUTO: 0.02 10*3/MM3 (ref 0–0.05)
IMM GRANULOCYTES NFR BLD AUTO: 0.3 % (ref 0–0.5)
LDLC SERPL CALC-MCNC: 87 MG/DL (ref 0–100)
LDLC/HDLC SERPL: 1.93 {RATIO}
LYMPHOCYTES # BLD AUTO: 1.95 10*3/MM3 (ref 0.7–3.1)
LYMPHOCYTES NFR BLD AUTO: 29.6 % (ref 19.6–45.3)
MCH RBC QN AUTO: 30.2 PG (ref 26.6–33)
MCHC RBC AUTO-ENTMCNC: 34.1 G/DL (ref 31.5–35.7)
MCV RBC AUTO: 88.5 FL (ref 79–97)
MONOCYTES # BLD AUTO: 0.51 10*3/MM3 (ref 0.1–0.9)
MONOCYTES NFR BLD AUTO: 7.7 % (ref 5–12)
NEUTROPHILS # BLD AUTO: 3.84 10*3/MM3 (ref 1.7–7)
NEUTROPHILS NFR BLD AUTO: 58.3 % (ref 42.7–76)
NRBC BLD AUTO-RTO: 0 /100 WBC (ref 0–0.2)
PLATELET # BLD AUTO: 231 10*3/MM3 (ref 140–450)
PMV BLD AUTO: 10 FL (ref 6–12)
POTASSIUM BLD-SCNC: 4.7 MMOL/L (ref 3.5–5.2)
PROT SERPL-MCNC: 7.4 G/DL (ref 6–8.5)
RBC # BLD AUTO: 4.34 10*6/MM3 (ref 3.77–5.28)
SODIUM BLD-SCNC: 139 MMOL/L (ref 136–145)
TRIGL SERPL-MCNC: 165 MG/DL (ref 0–150)
TSH SERPL DL<=0.05 MIU/L-ACNC: 2.08 UIU/ML (ref 0.27–4.2)
VLDLC SERPL-MCNC: 33 MG/DL (ref 5–40)
WBC NRBC COR # BLD: 6.59 10*3/MM3 (ref 3.4–10.8)

## 2020-02-26 PROCEDURE — 82550 ASSAY OF CK (CPK): CPT

## 2020-02-26 PROCEDURE — 85025 COMPLETE CBC W/AUTO DIFF WBC: CPT

## 2020-02-26 PROCEDURE — 84443 ASSAY THYROID STIM HORMONE: CPT

## 2020-02-26 PROCEDURE — 80061 LIPID PANEL: CPT

## 2020-02-26 PROCEDURE — 80053 COMPREHEN METABOLIC PANEL: CPT

## 2020-02-26 PROCEDURE — 36415 COLL VENOUS BLD VENIPUNCTURE: CPT

## 2020-02-28 ENCOUNTER — TELEPHONE (OUTPATIENT)
Dept: CARDIOLOGY | Facility: CLINIC | Age: 60
End: 2020-02-28

## 2020-02-28 ENCOUNTER — TELEPHONE (OUTPATIENT)
Dept: UROLOGY | Facility: CLINIC | Age: 60
End: 2020-02-28

## 2020-02-28 NOTE — TELEPHONE ENCOUNTER
----- Message from Evelyn Espinoza MD sent at 2/27/2020  9:26 AM EST -----  Lab work is good.  Kidney function is slightly low.  Drink more fluids

## 2020-02-28 NOTE — TELEPHONE ENCOUNTER
Pt would like for Dr. Benito to look at her blood work done by Dr. Pickering and make sure it is ok.  She would like someone to call her back.

## 2020-02-28 NOTE — TELEPHONE ENCOUNTER
I called the pt and told her that her labs were not too bad that some of her levels were a little bit elevated or a little under value. She said Dr Zhang;s office called and told her to drink more water and I agreed that according to her labs she looked a little dehydrated. I told her to keep an eye on her self and if her symptoms changed to call us.

## 2020-03-03 PROCEDURE — 87086 URINE CULTURE/COLONY COUNT: CPT | Performed by: UROLOGY

## 2020-03-09 RX ORDER — ATORVASTATIN CALCIUM 20 MG/1
20 TABLET, FILM COATED ORAL DAILY
Qty: 90 TABLET | Refills: 3 | Status: SHIPPED | OUTPATIENT
Start: 2020-03-09

## 2020-03-11 ENCOUNTER — TELEPHONE (OUTPATIENT)
Dept: CARDIOLOGY | Facility: CLINIC | Age: 60
End: 2020-03-11

## 2020-03-11 ENCOUNTER — APPOINTMENT (OUTPATIENT)
Dept: CT IMAGING | Facility: HOSPITAL | Age: 60
End: 2020-03-11

## 2020-03-11 ENCOUNTER — APPOINTMENT (OUTPATIENT)
Dept: GENERAL RADIOLOGY | Facility: HOSPITAL | Age: 60
End: 2020-03-11

## 2020-03-11 ENCOUNTER — HOSPITAL ENCOUNTER (EMERGENCY)
Facility: HOSPITAL | Age: 60
Discharge: HOME | End: 2020-03-11
Attending: EMERGENCY MEDICINE | Admitting: EMERGENCY MEDICINE

## 2020-03-11 VITALS
OXYGEN SATURATION: 98 % | RESPIRATION RATE: 16 BRPM | SYSTOLIC BLOOD PRESSURE: 128 MMHG | HEART RATE: 77 BPM | TEMPERATURE: 97.9 F | HEIGHT: 67 IN | BODY MASS INDEX: 31.08 KG/M2 | DIASTOLIC BLOOD PRESSURE: 61 MMHG | WEIGHT: 198 LBS

## 2020-03-11 DIAGNOSIS — R10.13 ACUTE EPIGASTRIC PAIN: ICD-10-CM

## 2020-03-11 DIAGNOSIS — R10.9 ACUTE ABDOMINAL PAIN: Primary | ICD-10-CM

## 2020-03-11 DIAGNOSIS — N20.0 RENAL STONE: ICD-10-CM

## 2020-03-11 DIAGNOSIS — K27.9 PUD (PEPTIC ULCER DISEASE): ICD-10-CM

## 2020-03-11 LAB
ALBUMIN SERPL-MCNC: 4.3 G/DL (ref 3.5–5.2)
ALBUMIN/GLOB SERPL: 1.4 G/DL
ALP SERPL-CCNC: 85 U/L (ref 39–117)
ALT SERPL W P-5'-P-CCNC: 20 U/L (ref 1–33)
ANION GAP SERPL CALCULATED.3IONS-SCNC: 10 MMOL/L (ref 5–15)
AST SERPL-CCNC: 19 U/L (ref 1–32)
BASOPHILS # BLD AUTO: 0.04 10*3/MM3 (ref 0–0.2)
BASOPHILS NFR BLD AUTO: 0.5 % (ref 0–1.5)
BILIRUB SERPL-MCNC: 0.4 MG/DL (ref 0.2–1.2)
BILIRUB UR QL STRIP: NEGATIVE
BUN BLD-MCNC: 18 MG/DL (ref 8–23)
BUN/CREAT SERPL: 14.3 (ref 7–25)
CALCIUM SPEC-SCNC: 9.5 MG/DL (ref 8.6–10.5)
CHLORIDE SERPL-SCNC: 105 MMOL/L (ref 98–107)
CLARITY UR: CLEAR
CO2 SERPL-SCNC: 25 MMOL/L (ref 22–29)
COLOR UR: YELLOW
CREAT BLD-MCNC: 1.26 MG/DL (ref 0.57–1)
DEPRECATED RDW RBC AUTO: 43.1 FL (ref 37–54)
EOSINOPHIL # BLD AUTO: 0.19 10*3/MM3 (ref 0–0.4)
EOSINOPHIL NFR BLD AUTO: 2.3 % (ref 0.3–6.2)
ERYTHROCYTE [DISTWIDTH] IN BLOOD BY AUTOMATED COUNT: 12.5 % (ref 12.3–15.4)
GFR SERPL CREATININE-BSD FRML MDRD: 43 ML/MIN/1.73
GLOBULIN UR ELPH-MCNC: 3.1 GM/DL
GLUCOSE BLD-MCNC: 96 MG/DL (ref 65–99)
GLUCOSE UR STRIP-MCNC: NEGATIVE MG/DL
HCT VFR BLD AUTO: 41.3 % (ref 34–46.6)
HGB BLD-MCNC: 13.5 G/DL (ref 12–15.9)
HGB UR QL STRIP.AUTO: NEGATIVE
HOLD SPECIMEN: NORMAL
HOLD SPECIMEN: NORMAL
IMM GRANULOCYTES # BLD AUTO: 0.02 10*3/MM3 (ref 0–0.05)
IMM GRANULOCYTES NFR BLD AUTO: 0.2 % (ref 0–0.5)
KETONES UR QL STRIP: ABNORMAL
LEUKOCYTE ESTERASE UR QL STRIP.AUTO: NEGATIVE
LIPASE SERPL-CCNC: 35 U/L (ref 13–60)
LYMPHOCYTES # BLD AUTO: 2.45 10*3/MM3 (ref 0.7–3.1)
LYMPHOCYTES NFR BLD AUTO: 29.4 % (ref 19.6–45.3)
MCH RBC QN AUTO: 30.5 PG (ref 26.6–33)
MCHC RBC AUTO-ENTMCNC: 32.7 G/DL (ref 31.5–35.7)
MCV RBC AUTO: 93.4 FL (ref 79–97)
MONOCYTES # BLD AUTO: 0.54 10*3/MM3 (ref 0.1–0.9)
MONOCYTES NFR BLD AUTO: 6.5 % (ref 5–12)
NEUTROPHILS # BLD AUTO: 5.09 10*3/MM3 (ref 1.7–7)
NEUTROPHILS NFR BLD AUTO: 61.1 % (ref 42.7–76)
NITRITE UR QL STRIP: NEGATIVE
NRBC BLD AUTO-RTO: 0 /100 WBC (ref 0–0.2)
PH UR STRIP.AUTO: 5.5 [PH] (ref 5–8)
PLATELET # BLD AUTO: 270 10*3/MM3 (ref 140–450)
PMV BLD AUTO: 9.7 FL (ref 6–12)
POTASSIUM BLD-SCNC: 4.7 MMOL/L (ref 3.5–5.2)
PROT SERPL-MCNC: 7.4 G/DL (ref 6–8.5)
PROT UR QL STRIP: NEGATIVE
RBC # BLD AUTO: 4.42 10*6/MM3 (ref 3.77–5.28)
SODIUM BLD-SCNC: 140 MMOL/L (ref 136–145)
SP GR UR STRIP: 1.02 (ref 1–1.03)
TROPONIN T SERPL-MCNC: <0.01 NG/ML (ref 0–0.03)
UROBILINOGEN UR QL STRIP: ABNORMAL
WBC NRBC COR # BLD: 8.33 10*3/MM3 (ref 3.4–10.8)
WHOLE BLOOD HOLD SPECIMEN: NORMAL
WHOLE BLOOD HOLD SPECIMEN: NORMAL

## 2020-03-11 PROCEDURE — 25010000002 ONDANSETRON PER 1 MG: Performed by: EMERGENCY MEDICINE

## 2020-03-11 PROCEDURE — 93005 ELECTROCARDIOGRAM TRACING: CPT | Performed by: EMERGENCY MEDICINE

## 2020-03-11 PROCEDURE — 74178 CT ABD&PLV WO CNTR FLWD CNTR: CPT

## 2020-03-11 PROCEDURE — 93005 ELECTROCARDIOGRAM TRACING: CPT

## 2020-03-11 PROCEDURE — 84484 ASSAY OF TROPONIN QUANT: CPT

## 2020-03-11 PROCEDURE — 0 IOPAMIDOL PER 1 ML: Performed by: EMERGENCY MEDICINE

## 2020-03-11 PROCEDURE — 80053 COMPREHEN METABOLIC PANEL: CPT

## 2020-03-11 PROCEDURE — 85025 COMPLETE CBC W/AUTO DIFF WBC: CPT

## 2020-03-11 PROCEDURE — 96375 TX/PRO/DX INJ NEW DRUG ADDON: CPT

## 2020-03-11 PROCEDURE — 81003 URINALYSIS AUTO W/O SCOPE: CPT | Performed by: EMERGENCY MEDICINE

## 2020-03-11 PROCEDURE — 83690 ASSAY OF LIPASE: CPT

## 2020-03-11 PROCEDURE — 25010000002 HYDROMORPHONE PER 4 MG: Performed by: EMERGENCY MEDICINE

## 2020-03-11 PROCEDURE — 71045 X-RAY EXAM CHEST 1 VIEW: CPT

## 2020-03-11 PROCEDURE — 96374 THER/PROPH/DIAG INJ IV PUSH: CPT

## 2020-03-11 PROCEDURE — 99284 EMERGENCY DEPT VISIT MOD MDM: CPT

## 2020-03-11 RX ORDER — SODIUM CHLORIDE 0.9 % (FLUSH) 0.9 %
10 SYRINGE (ML) INJECTION AS NEEDED
Status: DISCONTINUED | OUTPATIENT
Start: 2020-03-11 | End: 2020-03-11 | Stop reason: HOSPADM

## 2020-03-11 RX ORDER — ONDANSETRON 2 MG/ML
4 INJECTION INTRAMUSCULAR; INTRAVENOUS ONCE
Status: COMPLETED | OUTPATIENT
Start: 2020-03-11 | End: 2020-03-11

## 2020-03-11 RX ORDER — HYDROCODONE BITARTRATE AND ACETAMINOPHEN 5; 325 MG/1; MG/1
1-2 TABLET ORAL EVERY 6 HOURS PRN
Qty: 10 TABLET | Refills: 0 | Status: SHIPPED | OUTPATIENT
Start: 2020-03-11 | End: 2020-10-29

## 2020-03-11 RX ORDER — PROMETHAZINE HYDROCHLORIDE 25 MG/1
25 TABLET ORAL EVERY 6 HOURS PRN
Qty: 12 TABLET | Refills: 0 | Status: SHIPPED | OUTPATIENT
Start: 2020-03-11

## 2020-03-11 RX ORDER — HYDROMORPHONE HYDROCHLORIDE 1 MG/ML
0.5 INJECTION, SOLUTION INTRAMUSCULAR; INTRAVENOUS; SUBCUTANEOUS ONCE
Status: COMPLETED | OUTPATIENT
Start: 2020-03-11 | End: 2020-03-11

## 2020-03-11 RX ORDER — RANITIDINE 150 MG/1
150 TABLET ORAL 2 TIMES DAILY
Qty: 20 TABLET | Refills: 0 | Status: SHIPPED | OUTPATIENT
Start: 2020-03-11 | End: 2020-09-03 | Stop reason: ALTCHOICE

## 2020-03-11 RX ADMIN — ONDANSETRON 4 MG: 2 INJECTION INTRAMUSCULAR; INTRAVENOUS at 17:30

## 2020-03-11 RX ADMIN — HYDROMORPHONE HYDROCHLORIDE 0.5 MG: 1 INJECTION, SOLUTION INTRAMUSCULAR; INTRAVENOUS; SUBCUTANEOUS at 17:30

## 2020-03-11 RX ADMIN — IOPAMIDOL 95 ML: 755 INJECTION, SOLUTION INTRAVENOUS at 18:20

## 2020-03-11 RX ADMIN — SODIUM CHLORIDE 1000 ML: 9 INJECTION, SOLUTION INTRAVENOUS at 17:30

## 2020-03-11 RX ADMIN — SODIUM CHLORIDE 1000 ML: 9 INJECTION, SOLUTION INTRAVENOUS at 19:00

## 2020-03-11 NOTE — TELEPHONE ENCOUNTER
Called and spoke with patient and given message per DR. Espinoza.  She stated she was at the [facility] ER as we spoke as she was in much pain.  She stated she also had an appt with a GI specialist in [place] on Monday.

## 2020-03-11 NOTE — ED PROVIDER NOTES
"Subjective   Sona Rosa is a 59 year old female complaining of abdominal pain. The patient reports that she has had a pain to her left-sided abdomen for the past year along with nausea and vomiting. She states that her symptoms have been gradually worsening and that she now becomes ill with food or liquid intake. Additionally, she has had significant abdominal bloating. She has been to the ER in [place], [place] and states that she was once suspected to have her \"small intestine connected to her appendix.\" The patient received an appendectomy in October or November 2019 but did not experience any relief from her symptoms. She returned to the [facility] ER yesterday, though she was discharged and told to follow up with a gastroenterologist in five days. However, the patient reports that she was unable to get an appointment with a gastroenterologist and decided to come to the [facility] ER this afternoon instead. She currently believes that her symptoms may be related to her gallbladder. She denies any fever, but has had weight gain of 4-5 pounds over the past week as well as some pain between her shoulder blades which she attributes to gas. The patient has a history of hypertension and kidney stones but denies a history of congestive heart failure. She adds that her physicians attempted to treat a prior kidney stone with lasers, though it was unsuccessful. She subsequently had small portions of her kidneys removed six months apart. The patient sees a urologist regularly but does not see a nephrologist. The patient denies receiving a CT with contrast since the onset of her symptoms, though she has received one recently without contrast which revealed an unspecified liver lesion as well as a large kidney stone. She received a colonoscopy two months ago with Dr. Oglesby, who removed a precancerous polyp. She will follow up with that physician in four months. She reports that she has had normal cardiac catheterizations and " stress tests in the past with Dr. Hayes's office, though she did not see Dr. Hayes personally. She also sees Dr. Espinoza, cardiology, in [place]. She has been treating her nausea with Zofran. There are no other acute complaints at this time.      History provided by:  Patient and relative  Abdominal Pain   Pain location:  LLQ and LUQ  Pain severity:  Moderate  Onset quality:  Gradual  Duration: 1 year.  Progression:  Worsening  Chronicity:  Recurrent  Context: eating and previous surgery    Relieved by:  Nothing  Worsened by:  Eating  Ineffective treatments: Appendectomy.  Associated symptoms: nausea and vomiting    Associated symptoms: no fever    Risk factors: obesity    Risk factors: not elderly        Review of Systems   Constitutional: Positive for unexpected weight change (4-5 pounds gained over week). Negative for fever.   Gastrointestinal: Positive for abdominal distention, abdominal pain, nausea and vomiting.   Musculoskeletal: Positive for back pain (patient believes secondary to gas).   All other systems reviewed and are negative.      Past Medical History:   Diagnosis Date   • B12 deficiency    • Fatigue    • GERD (gastroesophageal reflux disease)    • Hyperlipidemia    • Hypertension        No Known Allergies    Past Surgical History:   Procedure Laterality Date   • CARDIOVASCULAR STRESS TEST  2017   •  SECTION     • ECHO - CONVERTED  2017   • KIDNEY SURGERY         Family History   Problem Relation Age of Onset   • Hyperlipidemia Mother    • Hypertension Mother    • Heart attack Father 65   • Cancer Father    • Hyperlipidemia Brother    • Hypertension Brother        Social History     Socioeconomic History   • Marital status:      Spouse name: Not on file   • Number of children: Not on file   • Years of education: Not on file   • Highest education level: Not on file   Tobacco Use   • Smoking status: Never Smoker   • Smokeless tobacco: Never Used   Substance and Sexual  Activity   • Alcohol use: No   • Drug use: No   • Sexual activity: Defer         Objective   Physical Exam   Constitutional: She is oriented to person, place, and time. She appears well-developed and well-nourished.   HENT:   Head: Normocephalic and atraumatic.   Nose: Nose normal.   Eyes: Conjunctivae are normal. No scleral icterus.   Neck: Normal range of motion. Neck supple.   Cardiovascular: Normal rate and regular rhythm.   No murmur heard.  Pulmonary/Chest: Effort normal and breath sounds normal. No respiratory distress.   Abdominal: Soft. There is generalized tenderness.   Musculoskeletal: Normal range of motion.   Neurological: She is alert and oriented to person, place, and time.   Skin: Skin is warm and dry.   Psychiatric: She has a normal mood and affect. Her behavior is normal.   Nursing note and vitals reviewed.      Procedures         ED Course  ED Course as of Mar 11 2025   Wed Mar 11, 2020   1750 I discussed with the patient about IV fluids and her renal function.  We will give her some IV fluids before and after.  She did come up here for further evaluation as she reports she was not getting anywhere down where she lives that.  And she concerned about the lesion on her liver.  She is okay with getting IV contrast for further evaluation.  I did discuss the case with CAT scan we will give her some fluid before and after.  Also discussed the case with Dr. Valente.    [RON]   1927 At bedside reevaluating the patient and updating on lab and imaging results.--RON    [RF]   2016 I had a long sitdown conversation with the patient and the family the bedside.  She apparently has GI follow-up on Monday she is unsure who with.  But she was able to get this follow-up through Dr. Oglesby.  I do think a HIDA scan is in her future.  However this could also be lead to peptic ulcer disease or gastritis.  She does feel much better.  She is well aware the signs is worse condition.  All thankful and agreeable.  She is already  on a PPI.  I will add Zantac as well.  And sporadic pain medicine.    [JM]   2019 ABDIRAHMAN Request #: [id number]    ABDIRAHMAN query complete. Treatment plan to include limited course of prescribed  controlled substance. Risks including addiction, benefits, and alternatives presented to patient.--RON    [RF]   2025 I also recommended the patient follow-up with a cardiologist she said she is due to see them this month.  I think that is also reasonable.  Her EKG and troponin were negative.  And she is not having any chest pain.    [JM]      ED Course User Index  [JM] Daren Toribio APRN  [RF] Macy Tolentino     Recent Results (from the past 24 hour(s))   Comprehensive Metabolic Panel    Collection Time: 03/11/20  1:23 PM   Result Value Ref Range    Glucose 96 65 - 99 mg/dL    BUN 18 8 - 23 mg/dL    Creatinine 1.26 (H) 0.57 - 1.00 mg/dL    Sodium 140 136 - 145 mmol/L    Potassium 4.7 3.5 - 5.2 mmol/L    Chloride 105 98 - 107 mmol/L    CO2 25.0 22.0 - 29.0 mmol/L    Calcium 9.5 8.6 - 10.5 mg/dL    Total Protein 7.4 6.0 - 8.5 g/dL    Albumin 4.30 3.50 - 5.20 g/dL    ALT (SGPT) 20 1 - 33 U/L    AST (SGOT) 19 1 - 32 U/L    Alkaline Phosphatase 85 39 - 117 U/L    Total Bilirubin 0.4 0.2 - 1.2 mg/dL    eGFR Non African Amer 43 (L) >60 mL/min/1.73    Globulin 3.1 gm/dL    A/G Ratio 1.4 g/dL    BUN/Creatinine Ratio 14.3 7.0 - 25.0    Anion Gap 10.0 5.0 - 15.0 mmol/L   Lipase    Collection Time: 03/11/20  1:23 PM   Result Value Ref Range    Lipase 35 13 - 60 U/L   Troponin    Collection Time: 03/11/20  1:23 PM   Result Value Ref Range    Troponin T <0.010 0.000 - 0.030 ng/mL   Light Blue Top    Collection Time: 03/11/20  1:23 PM   Result Value Ref Range    Extra Tube hold for add-on    Green Top (Gel)    Collection Time: 03/11/20  1:23 PM   Result Value Ref Range    Extra Tube Hold for add-ons.    Lavender Top    Collection Time: 03/11/20  1:23 PM   Result Value Ref Range    Extra Tube hold for add-on    Gold Top - SST    Collection  Time: 03/11/20  1:23 PM   Result Value Ref Range    Extra Tube Hold for add-ons.    CBC Auto Differential    Collection Time: 03/11/20  1:23 PM   Result Value Ref Range    WBC 8.33 3.40 - 10.80 10*3/mm3    RBC 4.42 3.77 - 5.28 10*6/mm3    Hemoglobin 13.5 12.0 - 15.9 g/dL    Hematocrit 41.3 34.0 - 46.6 %    MCV 93.4 79.0 - 97.0 fL    MCH 30.5 26.6 - 33.0 pg    MCHC 32.7 31.5 - 35.7 g/dL    RDW 12.5 12.3 - 15.4 %    RDW-SD 43.1 37.0 - 54.0 fl    MPV 9.7 6.0 - 12.0 fL    Platelets 270 140 - 450 10*3/mm3    Neutrophil % 61.1 42.7 - 76.0 %    Lymphocyte % 29.4 19.6 - 45.3 %    Monocyte % 6.5 5.0 - 12.0 %    Eosinophil % 2.3 0.3 - 6.2 %    Basophil % 0.5 0.0 - 1.5 %    Immature Grans % 0.2 0.0 - 0.5 %    Neutrophils, Absolute 5.09 1.70 - 7.00 10*3/mm3    Lymphocytes, Absolute 2.45 0.70 - 3.10 10*3/mm3    Monocytes, Absolute 0.54 0.10 - 0.90 10*3/mm3    Eosinophils, Absolute 0.19 0.00 - 0.40 10*3/mm3    Basophils, Absolute 0.04 0.00 - 0.20 10*3/mm3    Immature Grans, Absolute 0.02 0.00 - 0.05 10*3/mm3    nRBC 0.0 0.0 - 0.2 /100 WBC   Urinalysis With Microscopic If Indicated (No Culture) - Urine, Clean Catch    Collection Time: 03/11/20  4:08 PM   Result Value Ref Range    Color, UA Yellow Yellow, Straw    Appearance, UA Clear Clear    pH, UA 5.5 5.0 - 8.0    Specific Gravity, UA 1.016 1.001 - 1.030    Glucose, UA Negative Negative    Ketones, UA Trace (A) Negative    Bilirubin, UA Negative Negative    Blood, UA Negative Negative    Protein, UA Negative Negative    Leuk Esterase, UA Negative Negative    Nitrite, UA Negative Negative    Urobilinogen, UA 1.0 E.U./dL 0.2 - 1.0 E.U./dL     Note: In addition to lab results from this visit, the labs listed above may include labs taken at another facility or during a different encounter within the last 24 hours. Please correlate lab times with ED admission and discharge times for further clarification of the services performed during this visit.    CT Abdomen Pelvis With & Without  Contrast   Preliminary Result   1. No visible liver lesions.   2. Multiple bilateral perinephric clips from previous nonspecified renal   surgery. Mild left renal cortical scarring. Appearance of clustered   calcifications along the dorsal margin of the left lower renal pole, but   only questionably within the collecting system. No evidence of any   ongoing obstructive uropathy.   3. No evidence of acute inflammatory process or other clearly acute   intra-abdominal or intrapelvic disease.       DICTATED:   03/11/2020   EDITED/ls :   03/11/2020           XR Chest 1 View   Preliminary Result   No evidence of active chest disease.       DICTATED:   03/11/2020   EDITED/ls :   03/11/2020                  Vitals:    03/11/20 1902 03/11/20 1930 03/11/20 1945 03/11/20 2000   BP: 142/84 145/70  126/58   Patient Position: Sitting      Pulse: 79      Resp: 16      Temp:       SpO2: 98% 98% 99% 98%   Weight:       Height:         Medications   sodium chloride 0.9 % flush 10 mL (has no administration in time range)   sodium chloride 0.9 % bolus 1,000 mL (0 mL Intravenous Stopped 3/11/20 1859)   HYDROmorphone (DILAUDID) injection 0.5 mg (0.5 mg Intravenous Given 3/11/20 1730)   ondansetron (ZOFRAN) injection 4 mg (4 mg Intravenous Given 3/11/20 1730)   sodium chloride 0.9 % bolus 1,000 mL (0 mL Intravenous Stopped 3/11/20 2013)   iopamidol (ISOVUE-370) 76 % injection 100 mL (95 mL Intravenous Given 3/11/20 1820)     ECG/EMG Results (last 24 hours)     Procedure Component Value Units Date/Time    ECG 12 Lead [[id number]] Collected:  03/11/20 1320     Updated:  03/11/20 1320        ECG 12 Lead                                                    MDM  Number of Diagnoses or Management Options     Amount and/or Complexity of Data Reviewed  Decide to obtain previous medical records or to obtain history from someone other than the patient: yes        Final diagnoses:   Acute abdominal pain   Renal stone   PUD (peptic ulcer disease)    Acute epigastric pain       Documentation assistance provided by carolyn Tolentino.  Information recorded by the scribe was done at my direction and has been verified and validated by me.     Macy Tolentino  03/11/20 1706       Macy Tolentino  03/11/20 1711       Daren Toribio APRN  03/11/20 2025

## 2020-03-11 NOTE — TELEPHONE ENCOUNTER
----- Message from Evelyn Espinoza MD sent at 3/11/2020  1:29 PM EDT -----  CT scan from [place] shows shows a 10 mm port on the left lobe of the liver, this will need further work-up.  Patient will need GI consult and a CT scan with contrast.  Left kidney stone noted.

## 2020-03-12 NOTE — DISCHARGE INSTRUCTIONS
Please call ASAP to arrange outpatient follow up with one of the following gastroenterologists:    [facility] [place]. Chapincito Morales, Dr. FELICITA Hernandez, Dr. Guicho Hickey, MARCIO Rachel    Office Location:  [address] [id number]    Office # [phone]    Or    [facility] [place]. Robbie Levi and Dr. Tru Lyman    Office Location:   [address] [id number]    Office # [phone]    Follow up with one of the [facility] Primary Care Providers below to setup primary care. If you need assistance coordinating a primary care appointment with a [facility] Primary Care Provider, please contact the Primary Care Coordinators at [phone] for appointment scheduling.    [facility], Primary Care   [address], [address]   [place], [place] [address] [phone]    [facility] Internal Medicine & Endocrinology  [address], [address]  [place] [address] [phone]    [facility] Family Medicine  [address], [address]   [place] [address] [phone]    [facility] Primary Care  [address], [address]  [place], [place] [address] [phone]    [facility], Primary Care,   [address], [address]   [place], [place] [address] [phone]    [facility] Primary Care  [address]  [place], [place] [address] [phone]    [facility], Primary Care  [address], [address]  [place], [place] [address] [phone]    [facility] Internal Medicine & Pediatrics  [address] [place] [address], [address]   [place] [address] [phone]    [facility], Primary Care  [address] [place] [address] [phone]      [facility] Primary  [facility]  [address], [address]   [place] [address] [phone]    [facility] Medicine  [address] [place] [address] [phone]

## 2020-06-03 DIAGNOSIS — K21.9 GASTROESOPHAGEAL REFLUX DISEASE WITHOUT ESOPHAGITIS: ICD-10-CM

## 2020-06-03 RX ORDER — PANTOPRAZOLE SODIUM 40 MG/1
40 TABLET, DELAYED RELEASE ORAL DAILY
Qty: 90 TABLET | Refills: 0 | Status: SHIPPED | OUTPATIENT
Start: 2020-06-03 | End: 2020-09-03

## 2020-06-09 ENCOUNTER — OFFICE VISIT (OUTPATIENT)
Dept: UROLOGY | Facility: CLINIC | Age: 60
End: 2020-06-09

## 2020-06-09 VITALS — BODY MASS INDEX: 31.55 KG/M2 | WEIGHT: 201 LBS | HEIGHT: 67 IN | TEMPERATURE: 98.4 F

## 2020-06-09 DIAGNOSIS — N39.41 URGENCY INCONTINENCE: Primary | ICD-10-CM

## 2020-06-09 DIAGNOSIS — N39.0 URINARY TRACT INFECTION WITHOUT HEMATURIA, SITE UNSPECIFIED: ICD-10-CM

## 2020-06-09 DIAGNOSIS — N39.0 RECURRENT UTI: ICD-10-CM

## 2020-06-09 LAB
BILIRUB BLD-MCNC: ABNORMAL MG/DL
CLARITY, POC: CLEAR
COLOR UR: YELLOW
GLUCOSE UR STRIP-MCNC: NEGATIVE MG/DL
KETONES UR QL: NEGATIVE
LEUKOCYTE EST, POC: NEGATIVE
NITRITE UR-MCNC: NEGATIVE MG/ML
PH UR: 5.5 [PH] (ref 5–8)
PROT UR STRIP-MCNC: NEGATIVE MG/DL
RBC # UR STRIP: ABNORMAL /UL
SP GR UR: 1.02 (ref 1–1.03)
UROBILINOGEN UR QL: NORMAL

## 2020-06-09 PROCEDURE — 99213 OFFICE O/P EST LOW 20 MIN: CPT | Performed by: UROLOGY

## 2020-06-09 RX ORDER — TRIMETHOPRIM 100 MG/1
TABLET ORAL
Qty: 30 TABLET | Refills: 11 | Status: SHIPPED | OUTPATIENT
Start: 2020-06-09

## 2020-06-09 RX ORDER — TROSPIUM CHLORIDE ER 60 MG/1
60 CAPSULE ORAL EVERY MORNING
Qty: 30 CAPSULE | Refills: 11 | Status: SHIPPED | OUTPATIENT
Start: 2020-06-09 | End: 2020-10-06

## 2020-06-09 NOTE — PROGRESS NOTES
Chief Complaint:          Recurrent infections.    HPI:   60 y.o. female.  Pt has had bilateral renal surgeries in the 80's and she has renal segmental atrophy and calcification of her renal scarring.  HPI  Her UA today is negative.  Pt has had one UTI a couple of months ago.    Pt has stopped her suppressive antibiotic therapy.  Past Medical History:        Past Medical History:   Diagnosis Date   • B12 deficiency    • Fatigue    • GERD (gastroesophageal reflux disease)    • Hyperlipidemia    • Hypertension          Current Meds:     Current Outpatient Medications   Medication Sig Dispense Refill   • atorvastatin (LIPITOR) 20 MG tablet TAKE 1 TABLET BY MOUTH DAILY 90 tablet 3   • buPROPion XL (WELLBUTRIN XL) 150 MG 24 hr tablet Take 1 tablet by mouth Daily. 90 tablet 2   • cholecalciferol (VITAMIN D3) 1000 UNITS tablet Take 1,000 Units by mouth daily.     • HYDROcodone-acetaminophen (NORCO) 5-325 MG per tablet Take 1-2 tablets by mouth Every 6 (Six) Hours As Needed for Severe Pain . 10 tablet 0   • lisinopril (PRINIVIL,ZESTRIL) 20 MG tablet Take 1 tablet by mouth 2 (Two) Times a Day. 180 tablet 3   • ondansetron (ZOFRAN) 4 MG tablet Take 1 tablet by mouth Every 8 (Eight) Hours As Needed for Nausea or Vomiting. 30 tablet 0   • pantoprazole (PROTONIX) 40 MG EC tablet TAKE 1 TABLET BY MOUTH DAILY 90 tablet 0   • promethazine (PHENERGAN) 25 MG tablet Take 1 tablet by mouth Every 6 (Six) Hours As Needed for Nausea or Vomiting. 12 tablet 0   • raNITIdine (ZANTAC) 150 MG tablet Take 1 tablet by mouth 2 (Two) Times a Day. 20 tablet 0   • sertraline (ZOLOFT) 50 MG tablet Take 1 tablet by mouth Daily. 90 tablet 3   • trimethoprim (TRIMPEX) 100 MG tablet Take 1 tablet nightly 30 tablet 11   • trospium 60 MG capsule sustained-release 24 hr capsule Take 1 capsule by mouth Every Morning. 30 capsule 11     No current facility-administered medications for this visit.         Allergies:      No Known Allergies     Past Surgical  History:     Past Surgical History:   Procedure Laterality Date   • CARDIOVASCULAR STRESS TEST  2017   •  SECTION     • ECHO - CONVERTED  2017   • KIDNEY SURGERY           Social History:     Social History     Socioeconomic History   • Marital status:      Spouse name: Not on file   • Number of children: Not on file   • Years of education: Not on file   • Highest education level: Not on file   Tobacco Use   • Smoking status: Never Smoker   • Smokeless tobacco: Never Used   Substance and Sexual Activity   • Alcohol use: No   • Drug use: No   • Sexual activity: Defer       Family History:     Family History   Problem Relation Age of Onset   • Hyperlipidemia Mother    • Hypertension Mother    • Heart attack Father 65   • Cancer Father    • Hyperlipidemia Brother    • Hypertension Brother        Review of Systems:     Review of Systems   Constitutional: Negative for chills, fatigue and fever.   HENT: Negative for congestion and sinus pressure.    Respiratory: Negative for chest tightness and shortness of breath.    Cardiovascular: Negative for chest pain.   Gastrointestinal: Negative for abdominal pain, constipation, diarrhea, nausea and vomiting.   Genitourinary: Positive for dysuria and frequency. Negative for hematuria and urgency.   Musculoskeletal: Negative for back pain and neck pain.   Neurological: Negative for dizziness and headaches.   Hematological: Does not bruise/bleed easily.   Psychiatric/Behavioral: The patient is not nervous/anxious.          Physical Exam:     Physical Exam   Constitutional: She is oriented to person, place, and time. She appears well-developed.   HENT:   Head: Normocephalic.   Right Ear: External ear normal.   Left Ear: External ear normal.   Nose: Nose normal.   Mouth/Throat: Oropharynx is clear and moist.   Eyes: Pupils are equal, round, and reactive to light. Conjunctivae and EOM are normal.   Neck: Normal range of motion. Neck supple. No tracheal  "deviation present. No thyromegaly present.   Cardiovascular: Normal heart sounds. Exam reveals no gallop and no friction rub.   No murmur heard.  Pulmonary/Chest: Effort normal and breath sounds normal. No respiratory distress. She has no wheezes. She has no rales. She exhibits no tenderness.   Abdominal: Soft. Bowel sounds are normal. She exhibits no distension and no mass. There is no tenderness. There is no rebound and no guarding. No hernia.   Musculoskeletal: Normal range of motion. She exhibits no edema.   Lymphadenopathy:     She has no cervical adenopathy.   Neurological: She is alert and oriented to person, place, and time. She displays normal reflexes. No cranial nerve deficit. She exhibits normal muscle tone. Coordination normal.   Skin: Skin is warm. No rash noted.   Psychiatric: She has a normal mood and affect. Judgment normal.       Temp 98.4 °F (36.9 °C)   Ht 170.2 cm (67\")   Wt 91.2 kg (201 lb)   BMI 31.48 kg/m²    Procedure:         Assessment:     Encounter Diagnoses   Name Primary?   • Recurrent UTI    • Urinary tract infection without hematuria, site unspecified    • Urgency incontinence Yes       Orders Placed This Encounter   Procedures   • POC Urinalysis Dipstick, Automated       Patient reports that she is not currently experiencing any symptoms of urinary incontinence.      Plan:   Will resume trimethoprim 100 mg qhs and sanctura  Will see her in 6 months.    Patient's Body mass index is 31.48 kg/m². BMI is above normal parameters. Recommendations include: referral to primary care.      Smoking Cessation Counseling:  Former smoker.  Patient does not currently use any tobacco products.     Counseling was given to patient for the following topics instructions for management as follows: recurrent infections and urgency. The interim medical history and current results were reviewed.  A treatment plan with follow-up was made for Urgency incontinence [N39.41]   This document has been " electronically signed by Viraj Benito MD June 9, 2020 14:02

## 2020-09-03 DIAGNOSIS — K21.9 GASTROESOPHAGEAL REFLUX DISEASE WITHOUT ESOPHAGITIS: ICD-10-CM

## 2020-09-03 RX ORDER — PANTOPRAZOLE SODIUM 40 MG/1
40 TABLET, DELAYED RELEASE ORAL DAILY
Qty: 30 TABLET | Refills: 0 | Status: SHIPPED | OUTPATIENT
Start: 2020-09-03

## 2020-10-06 DIAGNOSIS — N39.41 URGENCY INCONTINENCE: ICD-10-CM

## 2020-10-06 RX ORDER — TROSPIUM CHLORIDE ER 60 MG/1
CAPSULE ORAL
Qty: 30 CAPSULE | Refills: 11 | Status: SHIPPED | OUTPATIENT
Start: 2020-10-06 | End: 2021-10-12 | Stop reason: SDUPTHER

## 2020-10-29 ENCOUNTER — HOSPITAL ENCOUNTER (OUTPATIENT)
Dept: GENERAL RADIOLOGY | Facility: HOSPITAL | Age: 60
Discharge: HOME | End: 2020-10-29
Admitting: NURSE PRACTITIONER

## 2020-10-29 ENCOUNTER — OFFICE VISIT (OUTPATIENT)
Dept: UROLOGY | Facility: CLINIC | Age: 60
End: 2020-10-29

## 2020-10-29 VITALS — HEIGHT: 67 IN | WEIGHT: 198 LBS | BODY MASS INDEX: 31.08 KG/M2 | TEMPERATURE: 98.8 F

## 2020-10-29 DIAGNOSIS — Z87.442 HISTORY OF KIDNEY STONES: ICD-10-CM

## 2020-10-29 DIAGNOSIS — N39.0 RECURRENT UTI: Primary | ICD-10-CM

## 2020-10-29 DIAGNOSIS — R10.9 ACUTE LEFT FLANK PAIN: ICD-10-CM

## 2020-10-29 DIAGNOSIS — N20.1 LEFT URETERAL STONE: ICD-10-CM

## 2020-10-29 DIAGNOSIS — N20.0 BILATERAL NEPHROLITHIASIS: ICD-10-CM

## 2020-10-29 LAB
BILIRUB BLD-MCNC: NEGATIVE MG/DL
CLARITY, POC: CLEAR
COLOR UR: YELLOW
GLUCOSE UR STRIP-MCNC: NEGATIVE MG/DL
KETONES UR QL: NEGATIVE
LEUKOCYTE EST, POC: NEGATIVE
NITRITE UR-MCNC: NEGATIVE MG/ML
PH UR: 6 [PH] (ref 5–8)
PROT UR STRIP-MCNC: NEGATIVE MG/DL
RBC # UR STRIP: ABNORMAL /UL
SP GR UR: 1.02 (ref 1–1.03)
UROBILINOGEN UR QL: NORMAL

## 2020-10-29 PROCEDURE — 74018 RADEX ABDOMEN 1 VIEW: CPT | Performed by: RADIOLOGY

## 2020-10-29 PROCEDURE — 74018 RADEX ABDOMEN 1 VIEW: CPT

## 2020-10-29 PROCEDURE — 87086 URINE CULTURE/COLONY COUNT: CPT | Performed by: NURSE PRACTITIONER

## 2020-10-29 PROCEDURE — 99214 OFFICE O/P EST MOD 30 MIN: CPT | Performed by: NURSE PRACTITIONER

## 2020-10-29 RX ORDER — ONDANSETRON 4 MG/1
4 TABLET, FILM COATED ORAL EVERY 12 HOURS PRN
Qty: 30 TABLET | Refills: 0 | Status: SHIPPED | OUTPATIENT
Start: 2020-10-29

## 2020-10-29 RX ORDER — IBUPROFEN 800 MG/1
800 TABLET ORAL EVERY 8 HOURS PRN
Qty: 30 TABLET | Refills: 0 | Status: CANCELLED | OUTPATIENT
Start: 2020-10-29

## 2020-10-29 RX ORDER — PHENAZOPYRIDINE HYDROCHLORIDE 100 MG/1
100 TABLET, FILM COATED ORAL
COMMUNITY
Start: 2020-10-03

## 2020-10-29 RX ORDER — TAMSULOSIN HYDROCHLORIDE 0.4 MG/1
1 CAPSULE ORAL NIGHTLY
Qty: 30 CAPSULE | Refills: 1 | Status: SHIPPED | OUTPATIENT
Start: 2020-10-29

## 2020-10-29 RX ORDER — CYCLOBENZAPRINE HCL 5 MG
5 TABLET ORAL EVERY 8 HOURS PRN
Qty: 15 TABLET | Refills: 0 | Status: SHIPPED | OUTPATIENT
Start: 2020-10-29

## 2020-10-29 RX ORDER — ETODOLAC 400 MG/1
400 TABLET, FILM COATED ORAL 2 TIMES DAILY
Qty: 20 TABLET | Refills: 0 | Status: SHIPPED | OUTPATIENT
Start: 2020-10-29

## 2020-10-29 NOTE — PROGRESS NOTES
Chief Complaint:          Chief Complaint   Patient presents with   • Urinary Tract Infection /Left Flank Pain/LLQ abdominal pain     follow up UTI/Dysuria/Flank Pain       HPI:   60 y.o. female.  Very pleasant female with significant history of kidney stone disease presents to clinic today with concerns of Bilateral flank pain, LEFT flank >RightFlank.  Patient reports her pain has been intermittent,  worse the last few days.      She states yesterday she did a lot of lawn mowing on a riding lawnmower yesterday, with jarring motions in and out of potholes on her lawn and might have hurt her flank area. She reports significant left flank pain/discomfort since yesterday that has been colicky in nature and intermittent 5/10.  Aggravating  factors are position changes, patient reports her pain is better during rest.  She also reports frequent episodes of left lower quadrant abdominal pain/discomfort, nausea without vomiting.  She denies having any fevers or chills.     Patient reports that she has had some urine symptoms of frequency,  urgency and dysuria.  She reports pelvic pressure and discomfort, she has been on antibiotic prophylaxis with Trimpex, which she stopped 2 weeks ago 10/11/2020 secondary to preparation for colonoscopy procedure.  She denies any episodes of gross hematuria, or burning on urination.     Her urine dipstick today is completely negative for any infection, she has 1+ microscopic hematuria.     Will get a KUB      Past Medical History:        Past Medical History:   Diagnosis Date   • B12 deficiency    • Fatigue    • GERD (gastroesophageal reflux disease)    • Hyperlipidemia    • Hypertension      The following portions of the patient's history were reviewed and updated as appropriate: allergies, current medications, past family history, past medical history, past social history, past surgical history and problem list.    Current Meds:     Current Outpatient Medications   Medication Sig Dispense  Refill   • atorvastatin (LIPITOR) 20 MG tablet TAKE 1 TABLET BY MOUTH DAILY 90 tablet 3   • cholecalciferol (VITAMIN D3) 1000 UNITS tablet Take 1,000 Units by mouth daily.     • lisinopril (PRINIVIL,ZESTRIL) 20 MG tablet Take 1 tablet by mouth 2 (Two) Times a Day. 180 tablet 3   • pantoprazole (PROTONIX) 40 MG EC tablet TAKE 1 TABLET BY MOUTH DAILY 30 tablet 0   • phenazopyridine (PYRIDIUM) 100 MG tablet 100 mg.     • sertraline (ZOLOFT) 50 MG tablet Take 1 tablet by mouth Daily. 90 tablet 3   • trimethoprim (TRIMPEX) 100 MG tablet Take 1 tablet nightly 30 tablet 11   • trospium 60 MG capsule sustained-release 24 hr capsule TAKE 1 CAPSULE BY MOUTH DAILY 30 capsule 11   • cyclobenzaprine (FLEXERIL) 5 MG tablet Take 1 tablet by mouth Every 8 (Eight) Hours As Needed for Muscle Spasms. 15 tablet 0   • etodolac (LODINE) 400 MG tablet Take 1 tablet by mouth 2 (Two) Times a Day. 20 tablet 0   • ondansetron (Zofran) 4 MG tablet Take 1 tablet by mouth Every 12 (Twelve) Hours As Needed for Nausea or Vomiting. 30 tablet 0   • promethazine (PHENERGAN) 25 MG tablet Take 1 tablet by mouth Every 6 (Six) Hours As Needed for Nausea or Vomiting. 12 tablet 0   • tamsulosin (Flomax) 0.4 MG capsule 24 hr capsule Take 1 capsule by mouth Every Night. 30 capsule 1     No current facility-administered medications for this visit.         Allergies:      No Known Allergies     Past Surgical History:     Past Surgical History:   Procedure Laterality Date   • CARDIOVASCULAR STRESS TEST  2017   •  SECTION     • ECHO - CONVERTED  2017   • KIDNEY SURGERY           Social History:     Social History     Socioeconomic History   • Marital status:      Spouse name: Not on file   • Number of children: Not on file   • Years of education: Not on file   • Highest education level: Not on file   Tobacco Use   • Smoking status: Never Smoker   • Smokeless tobacco: Never Used   Substance and Sexual Activity   • Alcohol use: No   •  Drug use: No   • Sexual activity: Defer       Family History:     Family History   Problem Relation Age of Onset   • Hyperlipidemia Mother    • Hypertension Mother    • Heart attack Father 65   • Cancer Father    • Hyperlipidemia Brother    • Hypertension Brother        Review of Systems:     Review of Systems   Constitutional: Positive for fatigue.   HENT: Negative for sinus pressure.    Eyes: Negative for pain and redness.   Respiratory: Positive for shortness of breath. Negative for chest tightness.    Cardiovascular: Negative for chest pain.   Gastrointestinal: Positive for constipation and nausea. Negative for abdominal pain and diarrhea.   Endocrine: Negative for heat intolerance.   Genitourinary: Positive for dysuria, flank pain, pelvic pain, pelvic pressure and urgency. Negative for frequency and hematuria.   Musculoskeletal: Positive for back pain.   Skin: Negative for rash.   Allergic/Immunologic: Negative for food allergies.   Neurological: Positive for headache.   Hematological: Does not bruise/bleed easily.   Psychiatric/Behavioral: The patient is not nervous/anxious.         Physical Exam:     Physical Exam  Constitutional:       General: She is not in acute distress.     Appearance: She is well-developed. She is ill-appearing.   HENT:      Head: Normocephalic and atraumatic.   Eyes:      Pupils: Pupils are equal, round, and reactive to light.   Neck:      Musculoskeletal: Normal range of motion.      Thyroid: No thyromegaly.      Trachea: No tracheal deviation.   Cardiovascular:      Rate and Rhythm: Normal rate and regular rhythm.      Heart sounds: No murmur.   Pulmonary:      Effort: Pulmonary effort is normal. No respiratory distress.      Breath sounds: Normal breath sounds. No stridor. No wheezing.   Abdominal:      General: Bowel sounds are normal. There is distension.      Palpations: Abdomen is soft.      Tenderness: There is abdominal tenderness.   Genitourinary:     Labia:         Right: No  tenderness.         Left: No tenderness.       Vagina: Normal. No vaginal discharge.   Musculoskeletal: Normal range of motion.         General: Tenderness ( left flank pain) present. No deformity.   Skin:     General: Skin is warm and dry.      Capillary Refill: Capillary refill takes less than 2 seconds.      Coloration: Skin is not pale.      Findings: No erythema or rash.   Neurological:      Mental Status: She is alert and oriented to person, place, and time.      Cranial Nerves: No cranial nerve deficit.      Sensory: No sensory deficit.      Coordination: Coordination normal.   Psychiatric:         Behavior: Behavior normal.         Thought Content: Thought content normal.         Judgment: Judgment normal.         Procedure:       Assessment/Plan:     Urine Frequency/Dysuria/Left Flank Pain/LLQ pain: She reports significant left flank pain that has been very bothersome to her.  She reports her pain as intermittent, colicky in nature and occasionally sharp 7 out of 10 at its worst.  She reports today has been a better day, but she is so tired from it last night.    We both reviewed/discussed her KUB results which showed bilateral nephrolithiasis.  Patient has calcifications noted in the right upper quadrant although fairly small.  There are also calcifications noted in the region of the left kidney measuring about 2.4 cm.  Also noted are left upper quadrant calcifications measuring 6.5 mm in the course of ureter.    Patient has some discomfort and describes her pain as colicky, but tolerable. We have discussed the various parameters regarding spontaneous passage including the notion that a 1-4 mm stone has a high likelihood of spontaneous passage versus a larger stone of >6 mm like she has  being caught up in the upper areas of the urinary tract.      We discussed the presence of the stone. We discussed the various therapeutic options available including percutaneous nephrostolithotomy, lithotripsy/ESWL. We  discussed the absolute relative indicators for intervention including the presence of sepsis, and pain we cannot control as the primary need for urgent intervention.       Patient reports ongoing personal issues (mom hospitalized) right now that she needs to take care of first prior to any surgical intervention.  She reports that she is pending colonoscopy for precancerous polyps scheduled next week.  Patient reports prior kidney surgeries and is very hesitant of any surgical interventions unless really needed.       PLAN    Discussed the need for upper tract investigation with CT stone protocol.    Gave gave her a refill of pain medication(etodolac) just in case and Nausea medication and Flomax for medical expulsive therapy.    Send her urine for culture, I will call her if any bacterial growth.    Discussed she resume the antibiotic prophylaxis with Trimpex as recommended by Dr. Benito    Continue probiotics as discussed    Follow-up in 2 weeks, review CT scan and definitive plan of care.    I will discuss the plan of care with Dr. Benito-Possible ESWL/left ureteroscopy laser lithotripsy possible stent left.    Patient is agreeable plan of care.    Patient reports that she is not currently experiencing any symptoms of urinary incontinence.    Patient's Body mass index is 31.01 kg/m². BMI is above normal parameters. Recommendations include: educational material, exercise counseling and nutrition counseling.    Smoking Cessation Counseling:  Never a smoker.  Patient does not currently use any tobacco products.     Counseling was given to patient for the following topics diagnostic results including: Bilateral nephrolithiasis, left ureteral stone, flank pain, UTI, instructions for management as follows: Increase p.o. fluid intake, increase ambulation around the house, resume antibiotic prophylaxis with Trimpex, pain/nausea control, Flomax, risk factor reductions including: High oxalate/sodium/calcium foods, avoid  bladder irritants such as caffeine products, soda drinks, citrus/spicy foods. and impressions as follows: Repeat CT stone protocol, schedule possible left ureteroscopy laser lithotripsy with Dr. Benito. The interim medical history and current results were reviewed.  A treatment plan with follow-up was made for: Left ureteral stone, Bilateral nephrolithiasis, Recurrent UTI [N39.0].         This document has been electronically signed by Griselda Cheng-Akwa, APRN October 29, 2020 17:26 EDT

## 2020-10-29 NOTE — PATIENT INSTRUCTIONS
"Dietary Guidelines to Help Prevent Kidney Stones  Kidney stones are deposits of minerals and salts that form inside your kidneys. Your risk of developing kidney stones may be greater depending on your diet, your lifestyle, the medicines you take, and whether you have certain medical conditions. Most people can reduce their chances of developing kidney stones by following the instructions below. Depending on your overall health and the type of kidney stones you tend to develop, your dietitian may give you more specific instructions.  What are tips for following this plan?  Reading food labels  · Choose foods with \"no salt added\" or \"low-salt\" labels. Limit your sodium intake to less than 1500 mg per day.  · Choose foods with calcium for each meal and snack. Try to eat about 300 mg of calcium at each meal. Foods that contain 200-500 mg of calcium per serving include:  ? 8 oz (237 ml) of milk, fortified nondairy milk, and fortified fruit juice.  ? 8 oz (237 ml) of kefir, yogurt, and soy yogurt.  ? 4 oz (118 ml) of tofu.  ? 1 oz of cheese.  ? 1 cup (300 g) of dried figs.  ? 1 cup (91 g) of cooked broccoli.  ? 1-3 oz can of sardines or mackerel.  · Most people need 1000 to 1500 mg of calcium each day. Talk to your dietitian about how much calcium is recommended for you.  Shopping  · Buy plenty of fresh fruits and vegetables. Most people do not need to avoid fruits and vegetables, even if they contain nutrients that may contribute to kidney stones.  · When shopping for convenience foods, choose:  ? Whole pieces of fruit.  ? Premade salads with dressing on the side.  ? Low-fat fruit and yogurt smoothies.  · Avoid buying frozen meals or prepared deli foods.  · Look for foods with live cultures, such as yogurt and kefir.  Cooking  · Do not add salt to food when cooking. Place a salt shaker on the table and allow each person to add his or her own salt to taste.  · Use vegetable protein, such as beans, textured vegetable " protein (TVP), or tofu instead of meat in pasta, casseroles, and soups.  Meal planning    · Eat less salt, if told by your dietitian. To do this:  ? Avoid eating processed or premade food.  ? Avoid eating fast food.  · Eat less animal protein, including cheese, meat, poultry, or fish, if told by your dietitian. To do this:  ? Limit the number of times you have meat, poultry, fish, or cheese each week. Eat a diet free of meat at least 2 days a week.  ? Eat only one serving each day of meat, poultry, fish, or seafood.  ? When you prepare animal protein, cut pieces into small portion sizes. For most meat and fish, one serving is about the size of one deck of cards.  · Eat at least 5 servings of fresh fruits and vegetables each day. To do this:  ? Keep fruits and vegetables on hand for snacks.  ? Eat 1 piece of fruit or a handful of berries with breakfast.  ? Have a salad and fruit at lunch.  ? Have two kinds of vegetables at dinner.  · Limit foods that are high in a substance called oxalate. These include:  ? Spinach.  ? Rhubarb.  ? Beets.  ? Potato chips and french fries.  ? Nuts.  · If you regularly take a diuretic medicine, make sure to eat at least 1-2 fruits or vegetables high in potassium each day. These include:  ? Avocado.  ? Banana.  ? Orange, prune, carrot, or tomato juice.  ? Baked potato.  ? Cabbage.  ? Beans and split peas.  General instructions    · Drink enough fluid to keep your urine clear or pale yellow. This is the most important thing you can do.  · Talk to your health care provider and dietitian about taking daily supplements. Depending on your health and the cause of your kidney stones, you may be advised:  ? Not to take supplements with vitamin C.  ? To take a calcium supplement.  ? To take a daily probiotic supplement.  ? To take other supplements such as magnesium, fish oil, or vitamin B6.  · Take all medicines and supplements as told by your health care provider.  · Limit alcohol intake to no  more than 1 drink a day for nonpregnant women and 2 drinks a day for men. One drink equals 12 oz of beer, 5 oz of wine, or 1½ oz of hard liquor.  · Lose weight if told by your health care provider. Work with your dietitian to find strategies and an eating plan that works best for you.  What foods are not recommended?  Limit your intake of the following foods, or as told by your dietitian. Talk to your dietitian about specific foods you should avoid based on the type of kidney stones and your overall health.  Grains  Breads. Bagels. Rolls. Baked goods. Salted crackers. Cereal. Pasta.  Vegetables  Spinach. Rhubarb. Beets. Canned vegetables. Pickles. Olives.  Meats and other protein foods  Nuts. Nut butters. Large portions of meat, poultry, or fish. Salted or cured meats. Deli meats. Hot dogs. Sausages.  Dairy  Cheese.  Beverages  Regular soft drinks. Regular vegetable juice.  Seasonings and other foods  Seasoning blends with salt. Salad dressings. Canned soups. Soy sauce. Ketchup. Barbecue sauce. Canned pasta sauce. Casseroles. Pizza. Lasagna. Frozen meals. Potato chips. French fries.  Summary  · You can reduce your risk of kidney stones by making changes to your diet.  · The most important thing you can do is drink enough fluid. You should drink enough fluid to keep your urine clear or pale yellow.  · Ask your health care provider or dietitian how much protein from animal sources you should eat each day, and also how much salt and calcium you should have each day.  This information is not intended to replace advice given to you by your health care provider. Make sure you discuss any questions you have with your health care provider.  Document Released: 04/13/2012 Document Revised: 04/08/2020 Document Reviewed: 11/28/2017  [employer] Patient Education © 2020 [employer]      Low-Purine Eating Plan  A low-purine eating plan involves making food choices to limit your intake of purine. Purine is a kind of uric acid. Too  much uric acid in your blood can cause certain conditions, such as gout and kidney stones. Eating a low-purine diet can help control these conditions.  What are tips for following this plan?  Reading food labels    · Avoid foods with saturated or Trans fat.  · Check the ingredient list of grains-based foods, such as bread and cereal, to make sure that they contain whole grains.  · Check the ingredient list of sauces or soups to make sure they do not contain meat or fish.  · When choosing soft drinks, check the ingredient list to make sure they do not contain high-fructose corn syrup.  Shopping  · Buy plenty of fresh fruits and vegetables.  · Avoid buying canned or fresh fish.  · Buy dairy products labeled as low-fat or nonfat.  · Avoid buying premade or processed foods. These foods are often high in fat, salt (sodium), and added sugar.  Cooking  · Use olive oil instead of butter when cooking. Oils like olive oil, canola oil, and sunflower oil contain healthy fats.  Meal planning  · Learn which foods do or do not affect you. If you find out that a food tends to cause your gout symptoms to flare up, avoid eating that food. You can enjoy foods that do not cause problems. If you have any questions about a food item, talk with your dietitian or health care provider.  · Limit foods high in fat, especially saturated fat. Fat makes it harder for your body to get rid of uric acid.  · Choose foods that are lower in fat and are lean sources of protein.  General guidelines  · Limit alcohol intake to no more than 1 drink a day for nonpregnant women and 2 drinks a day for men. One drink equals 12 oz of beer, 5 oz of wine, or 1½ oz of hard liquor. Alcohol can affect the way your body gets rid of uric acid.  · Drink plenty of water to keep your urine clear or pale yellow. Fluids can help remove uric acid from your body.  · If directed by your health care provider, take a vitamin C supplement.  · Work with your health care provider  and dietitian to develop a plan to achieve or maintain a healthy weight. Losing weight can help reduce uric acid in your blood.  What foods are recommended?  The items listed may not be a complete list. Talk with your dietitian about what dietary choices are best for you.  Foods low in purines  Foods low in purines do not need to be limited. These include:  · All fruits.  · All low-purine vegetables, pickles, and olives.  · Breads, pasta, rice, cornbread, and popcorn. Cake and other baked goods.  · All dairy foods.  · Eggs, nuts, and nut butters.  · Spices and condiments, such as salt, herbs, and vinegar.  · Plant oils, butter, and margarine.  · Water, sugar-free soft drinks, tea, coffee, and cocoa.  · Vegetable-based soups, broths, sauces, and gravies.  Foods moderate in purines  Foods moderate in purines should be limited to the amounts listed.  · ½ cup of asparagus, cauliflower, spinach, mushrooms, or green peas, each day.  · 2/3 cup uncooked oatmeal, each day.  · ¼ cup dry wheat bran or wheat germ, each day.  · 2-3 ounces of meat or poultry, each day.  · 4-6 ounces of shellfish, such as crab, lobster, oysters, or shrimp, each day.  · 1 cup cooked beans, peas, or lentils, each day.  · Soup, broths, or bouillon made from meat or fish. Limit these foods as much as possible.  What foods are not recommended?  The items listed may not be a complete list. Talk with your dietitian about what dietary choices are best for you.  Limit your intake of foods high in purines, including:  · Beer and other alcohol.  · Meat-based gravy or sauce.  · Canned or fresh fish, such as:  ? Anchovies, sardines, herring, and tuna.  ? Mussels and scallops.  ? Codfish, trout, and chante.  · Camp.  · Organ meats, such as:  ? Liver or kidney.  ? Tripe.  ? Sweetbreads (thymus gland or pancreas).  · Wild game or goose.  · Yeast or yeast extract supplements.  · Drinks sweetened with high-fructose corn syrup.  Summary  · Eating a low-purine diet  can help control conditions caused by too much uric acid in the body, such as gout or kidney stones.  · Choose low-purine foods, limit alcohol, and limit foods high in fat.  · You will learn over time which foods do or do not affect you. If you find out that a food tends to cause your gout symptoms to flare up, avoid eating that food.  This information is not intended to replace advice given to you by your health care provider. Make sure you discuss any questions you have with your health care provider.  Document Released: 04/13/2012 Document Revised: 11/30/2018 Document Reviewed: 01/31/2018  [employer] Patient Education © 2020 [employer]    Discussed a kidney stone prevention diet to include increasing p.o. fluid intake, to at least 1 to 2 L of water daily.  She is to avoid caffeine products such as cola, coffee, and to avoid soft or soda drinks.  She is to decrease her sodium consumption as in  Fast foods, gruber, salted nuts, canned foods, and smoked/cured foods. She is also to decrease her oxalate consumption, as in spinach, Jose greens, and Rhubarb.  Also important is to decrease protein intake, as in red meats, peanut butter, and also avoid nuts.  Urinary Tract Infection, Adult    A urinary tract infection (UTI) is an infection of any part of the urinary tract. The urinary tract includes the kidneys, ureters, bladder, and urethra. These organs make, store, and get rid of urine in the body.  Your health care provider may use other names to describe the infection. An upper UTI affects the ureters and kidneys (pyelonephritis). A lower UTI affects the bladder (cystitis) and urethra (urethritis).  What are the causes?  Most urinary tract infections are caused by bacteria in your genital area, around the entrance to your urinary tract (urethra). These bacteria grow and cause inflammation of your urinary tract.  What increases the risk?  You are more likely to develop this condition if:  · You have a urinary  catheter that stays in place (indwelling).  · You are not able to control when you urinate or have a bowel movement (you have incontinence).  · You are female and you:  ? Use a spermicide or diaphragm for birth control.  ? Have low estrogen levels.  ? Are pregnant.  · You have certain genes that increase your risk (genetics).  · You are sexually active.  · You take antibiotic medicines.  · You have a condition that causes your flow of urine to slow down, such as:  ? An enlarged prostate, if you are male.  ? Blockage in your urethra (stricture).  ? A kidney stone.  ? A nerve condition that affects your bladder control (neurogenic bladder).  ? Not getting enough to drink, or not urinating often.  · You have certain medical conditions, such as:  ? Diabetes.  ? A weak disease-fighting system (immunesystem).  ? Sickle cell disease.  ? Gout.  ? Spinal cord injury.  What are the signs or symptoms?  Symptoms of this condition include:  · Needing to urinate right away (urgently).  · Frequent urination or passing small amounts of urine frequently.  · Pain or burning with urination.  · Blood in the urine.  · Urine that smells bad or unusual.  · Trouble urinating.  · Cloudy urine.  · Vaginal discharge, if you are female.  · Pain in the abdomen or the lower back.  You may also have:  · Vomiting or a decreased appetite.  · Confusion.  · Irritability or tiredness.  · A fever.  · Diarrhea.  The first symptom in older adults may be confusion. In some cases, they may not have any symptoms until the infection has worsened.  How is this diagnosed?  This condition is diagnosed based on your medical history and a physical exam. You may also have other tests, including:  · Urine tests.  · Blood tests.  · Tests for sexually transmitted infections (STIs).  If you have had more than one UTI, a cystoscopy or imaging studies may be done to determine the cause of the infections.  How is this treated?  Treatment for this condition  includes:  · Antibiotic medicine.  · Over-the-counter medicines to treat discomfort.  · Drinking enough water to stay hydrated.  If you have frequent infections or have other conditions such as a kidney stone, you may need to see a health care provider who specializes in the urinary tract (urologist).  In rare cases, urinary tract infections can cause sepsis. Sepsis is a life-threatening condition that occurs when the body responds to an infection. Sepsis is treated in the hospital with IV antibiotics, fluids, and other medicines.  Follow these instructions at home:    Medicines  · Take over-the-counter and prescription medicines only as told by your health care provider.  · If you were prescribed an antibiotic medicine, take it as told by your health care provider. Do not stop using the antibiotic even if you start to feel better.  General instructions  · Make sure you:  ? Empty your bladder often and completely. Do not hold urine for long periods of time.  ? Empty your bladder after sex.  ? Wipe from front to back after a bowel movement if you are female. Use each tissue one time when you wipe.  · Drink enough fluid to keep your urine pale yellow.  · Keep all follow-up visits as told by your health care provider. This is important.  Contact a health care provider if:  · Your symptoms do not get better after 1-2 days.  · Your symptoms go away and then return.  Get help right away if you have:  · Severe pain in your back or your lower abdomen.  · A fever.  · Nausea or vomiting.  Summary  · A urinary tract infection (UTI) is an infection of any part of the urinary tract, which includes the kidneys, ureters, bladder, and urethra.  · Most urinary tract infections are caused by bacteria in your genital area, around the entrance to your urinary tract (urethra).  · Treatment for this condition often includes antibiotic medicines.  · If you were prescribed an antibiotic medicine, take it as told by your health care  provider. Do not stop using the antibiotic even if you start to feel better.  · Keep all follow-up visits as told by your health care provider. This is important.  This information is not intended to replace advice given to you by your health care provider. Make sure you discuss any questions you have with your health care provider.  Document Released: 09/27/2006 Document Revised: 12/05/2019 Document Reviewed: 06/27/2019  [employer] Patient Education © 2020 [employer]  Flank Pain, Adult  Flank pain is pain in your side. The flank is the area of your side between your upper belly (abdomen) and your back. The pain may occur over a short time (acute), or it may be long-term or come back often (chronic). It may be mild or very bad. Pain in this area can be caused by many different things.  Follow these instructions at home:    · Drink enough fluid to keep your pee (urine) clear or pale yellow.  · Rest as told by your doctor.  · Take over-the-counter and prescription medicines only as told by your doctor.  · Keep a journal to keep track of:  ? What has caused your flank pain.  ? What has made it feel better.  · Keep all follow-up visits as told by your doctor. This is important.  Contact a doctor if:  · Medicine does not help your pain.  · You have new symptoms.  · Your pain gets worse.  · You have a fever.  · Your symptoms last longer than 2-3 days.  · You have trouble peeing.  · You are peeing more often than normal.  Get help right away if:  · You have trouble breathing.  · You are short of breath.  · Your belly hurts, or it is swollen or red.  · You feel sick to your stomach (nauseous).  · You throw up (vomit).  · You feel like you will pass out, or you do pass out (faint).  · You have blood in your pee.  Summary  · Flank pain is pain in your side. The flank is the area of your side between your upper belly (abdomen) and your back.  · Flank pain may occur over a short time (acute), or it may be long-term or come  back often (chronic). It may be mild or very bad.  · Pain in this area can be caused by many different things.  · Contact your doctor if your symptoms get worse or they last longer than 2-3 days.  This information is not intended to replace advice given to you by your health care provider. Make sure you discuss any questions you have with your health care provider.  Document Released: 09/26/2009 Document Revised: 11/30/2018 Document Reviewed: 04/09/2018  [employer] Patient Education © 2020 [employer]

## 2020-10-30 LAB — BACTERIA SPEC AEROBE CULT: NO GROWTH

## 2020-11-10 ENCOUNTER — HOSPITAL ENCOUNTER (OUTPATIENT)
Dept: CT IMAGING | Facility: HOSPITAL | Age: 60
Discharge: HOME | End: 2020-11-10
Admitting: NURSE PRACTITIONER

## 2020-11-10 DIAGNOSIS — R10.9 ACUTE LEFT FLANK PAIN: ICD-10-CM

## 2020-11-10 DIAGNOSIS — Z87.442 HISTORY OF KIDNEY STONES: ICD-10-CM

## 2020-11-10 LAB — CREAT BLDA-MCNC: 1.2 MG/DL (ref 0.6–1.3)

## 2020-11-10 PROCEDURE — 82565 ASSAY OF CREATININE: CPT

## 2020-11-10 PROCEDURE — 74178 CT ABD&PLV WO CNTR FLWD CNTR: CPT

## 2020-11-10 PROCEDURE — 25010000002 IOPAMIDOL 61 % SOLUTION: Performed by: NURSE PRACTITIONER

## 2020-11-10 PROCEDURE — 74178 CT ABD&PLV WO CNTR FLWD CNTR: CPT | Performed by: RADIOLOGY

## 2020-11-10 RX ADMIN — IOPAMIDOL 100 ML: 612 INJECTION, SOLUTION INTRAVENOUS at 10:34

## 2020-11-12 ENCOUNTER — OFFICE VISIT (OUTPATIENT)
Dept: UROLOGY | Facility: CLINIC | Age: 60
End: 2020-11-12

## 2020-11-12 VITALS — WEIGHT: 198 LBS | BODY MASS INDEX: 31.08 KG/M2 | TEMPERATURE: 97.5 F | HEIGHT: 67 IN

## 2020-11-12 DIAGNOSIS — N28.89 RENAL MASS: ICD-10-CM

## 2020-11-12 DIAGNOSIS — R35.0 FREQUENCY OF URINATION: ICD-10-CM

## 2020-11-12 DIAGNOSIS — N20.0 KIDNEY STONE: Primary | ICD-10-CM

## 2020-11-12 PROCEDURE — 99215 OFFICE O/P EST HI 40 MIN: CPT | Performed by: UROLOGY

## 2020-11-12 NOTE — PROGRESS NOTES
Chief Complaint:        left flank pain      HPI:   60 y.o. female.  Pt has left flank pain for 3 to 4 weeks.  She has had bilateral kidney operation in 1980's  sje has had partial nephrectomies for stone disease and non funcitoning renal tissue and recurrent infections.  Her pain is 6 on scale of 10.  Her urine culture from 10/29 was no growth  HPI  Her ct scan done on 11/10/20 showed left non obstructing 2 cm total size stones and off the lower pole there appeared to be a 1.6 cm enhancing mass that has occurred since 2009.    Past Medical History:        Past Medical History:   Diagnosis Date   • B12 deficiency    • Fatigue    • GERD (gastroesophageal reflux disease)    • Hyperlipidemia    • Hypertension          Current Meds:     Current Outpatient Medications   Medication Sig Dispense Refill   • atorvastatin (LIPITOR) 20 MG tablet TAKE 1 TABLET BY MOUTH DAILY 90 tablet 3   • cholecalciferol (VITAMIN D3) 1000 UNITS tablet Take 1,000 Units by mouth daily.     • cyclobenzaprine (FLEXERIL) 5 MG tablet Take 1 tablet by mouth Every 8 (Eight) Hours As Needed for Muscle Spasms. 15 tablet 0   • etodolac (LODINE) 400 MG tablet Take 1 tablet by mouth 2 (Two) Times a Day. 20 tablet 0   • lisinopril (PRINIVIL,ZESTRIL) 20 MG tablet Take 1 tablet by mouth 2 (Two) Times a Day. 180 tablet 3   • ondansetron (Zofran) 4 MG tablet Take 1 tablet by mouth Every 12 (Twelve) Hours As Needed for Nausea or Vomiting. 30 tablet 0   • pantoprazole (PROTONIX) 40 MG EC tablet TAKE 1 TABLET BY MOUTH DAILY 30 tablet 0   • phenazopyridine (PYRIDIUM) 100 MG tablet 100 mg.     • promethazine (PHENERGAN) 25 MG tablet Take 1 tablet by mouth Every 6 (Six) Hours As Needed for Nausea or Vomiting. 12 tablet 0   • sertraline (ZOLOFT) 50 MG tablet Take 1 tablet by mouth Daily. 90 tablet 3   • tamsulosin (Flomax) 0.4 MG capsule 24 hr capsule Take 1 capsule by mouth Every Night. 30 capsule 1   • trimethoprim (TRIMPEX) 100 MG tablet Take 1 tablet nightly 30  tablet 11   • trospium 60 MG capsule sustained-release 24 hr capsule TAKE 1 CAPSULE BY MOUTH DAILY 30 capsule 11     No current facility-administered medications for this visit.         Allergies:      No Known Allergies     Past Surgical History:     Past Surgical History:   Procedure Laterality Date   • CARDIOVASCULAR STRESS TEST  2017   •  SECTION     • ECHO - CONVERTED  2017   • KIDNEY SURGERY           Social History:     Social History     Socioeconomic History   • Marital status:      Spouse name: Not on file   • Number of children: Not on file   • Years of education: Not on file   • Highest education level: Not on file   Tobacco Use   • Smoking status: Never Smoker   • Smokeless tobacco: Never Used   Substance and Sexual Activity   • Alcohol use: No   • Drug use: No   • Sexual activity: Defer       Family History:     Family History   Problem Relation Age of Onset   • Hyperlipidemia Mother    • Hypertension Mother    • Heart attack Father 65   • Cancer Father    • Hyperlipidemia Brother    • Hypertension Brother        Review of Systems:     Review of Systems   Constitutional: Negative for chills, fatigue and fever.   HENT: Negative for congestion and sinus pressure.    Respiratory: Negative for chest tightness and shortness of breath.    Cardiovascular: Negative for chest pain.   Gastrointestinal: Negative for abdominal pain, constipation, diarrhea, nausea and vomiting.   Genitourinary: Negative for frequency, hematuria and urgency.   Musculoskeletal: Negative for back pain and neck pain.   Neurological: Negative for dizziness and headaches.   Hematological: Does not bruise/bleed easily.   Psychiatric/Behavioral: The patient is not nervous/anxious.        Physical Exam:     Physical Exam  Constitutional:       Appearance: She is well-developed.   HENT:      Head: Normocephalic.      Right Ear: External ear normal.      Left Ear: External ear normal.      Nose: Nose normal.    "  Eyes:      Conjunctiva/sclera: Conjunctivae normal.      Pupils: Pupils are equal, round, and reactive to light.   Neck:      Musculoskeletal: Normal range of motion and neck supple.      Thyroid: No thyromegaly.      Trachea: No tracheal deviation.   Cardiovascular:      Heart sounds: Normal heart sounds. No murmur. No friction rub. No gallop.    Pulmonary:      Effort: Pulmonary effort is normal. No respiratory distress.      Breath sounds: Normal breath sounds. No wheezing or rales.   Chest:      Chest wall: No tenderness.   Abdominal:      General: Bowel sounds are normal. There is no distension.      Palpations: Abdomen is soft. There is no mass.      Tenderness: There is no abdominal tenderness. There is no guarding or rebound.      Hernia: No hernia is present.   Genitourinary:     Vagina: Normal.   Musculoskeletal: Normal range of motion.   Lymphadenopathy:      Cervical: No cervical adenopathy.   Skin:     General: Skin is warm.      Findings: No rash.   Neurological:      Mental Status: She is alert and oriented to person, place, and time.      Cranial Nerves: No cranial nerve deficit.      Motor: No abnormal muscle tone.      Coordination: Coordination normal.      Deep Tendon Reflexes: Reflexes normal.   Psychiatric:         Judgment: Judgment normal.         Temp 97.5 °F (36.4 °C)   Ht 170.2 cm (67\")   Wt 89.8 kg (198 lb)   BMI 31.01 kg/m²    Procedure:         Assessment:     Encounter Diagnoses   Name Primary?   • Frequency of urination    • Kidney stone Yes   • Renal mass        Orders Placed This Encounter   Procedures   • POC Urinalysis Dipstick, Automated       Patient reports that she is not currently experiencing any symptoms of urinary incontinence.      Plan:   I would recommend repeating her ct scan in 3 months regarding the mass.  I would recommend left flexible ureteroscopy laser lithotripsy and stent placement.    Patient's Body mass index is 31.01 kg/m². BMI is within normal " parameters. No follow-up required..      Smoking Cessation Counseling:  Never a smoker.  Patient does not currently use any tobacco products.     Counseling was given to patient for the following topics instructions for management as follows: stones. The interim medical history and current results were reviewed.  A treatment plan with follow-up was made for Kidney stone [N20.0]. I spent 46 minutes face to face with Sona Rosa and 80 percentage was spent in counseling.       This document has been electronically signed by Viraj Benito MD November 12, 2020 13:30 EST

## 2020-11-13 DIAGNOSIS — R35.0 FREQUENCY OF URINATION: Primary | ICD-10-CM

## 2020-11-23 ENCOUNTER — TELEPHONE (OUTPATIENT)
Dept: GASTROENTEROLOGY | Facility: CLINIC | Age: 60
End: 2020-11-23

## 2020-11-25 ENCOUNTER — APPOINTMENT (OUTPATIENT)
Dept: PREADMISSION TESTING | Facility: HOSPITAL | Age: 60
End: 2020-11-25

## 2020-11-25 DIAGNOSIS — D49.4 BLADDER TUMOR: Primary | ICD-10-CM

## 2021-02-25 DIAGNOSIS — Z23 IMMUNIZATION DUE: ICD-10-CM

## 2021-04-14 ENCOUNTER — PRIOR AUTHORIZATION (OUTPATIENT)
Dept: UROLOGY | Facility: CLINIC | Age: 61
End: 2021-04-14

## 2021-06-30 ENCOUNTER — LAB (OUTPATIENT)
Dept: LAB | Facility: HOSPITAL | Age: 61
End: 2021-06-30

## 2021-06-30 ENCOUNTER — TRANSCRIBE ORDERS (OUTPATIENT)
Dept: LAB | Facility: HOSPITAL | Age: 61
End: 2021-06-30

## 2021-06-30 DIAGNOSIS — R53.1 ASTHENIA: ICD-10-CM

## 2021-06-30 DIAGNOSIS — R06.02 SHORTNESS OF BREATH: Primary | ICD-10-CM

## 2021-06-30 DIAGNOSIS — R06.02 SHORTNESS OF BREATH: ICD-10-CM

## 2021-06-30 LAB
ANION GAP SERPL CALCULATED.3IONS-SCNC: 10.2 MMOL/L (ref 5–15)
BUN SERPL-MCNC: 14 MG/DL (ref 8–23)
BUN/CREAT SERPL: 12.4 (ref 7–25)
CALCIUM SPEC-SCNC: 9.2 MG/DL (ref 8.6–10.5)
CHLORIDE SERPL-SCNC: 103 MMOL/L (ref 98–107)
CO2 SERPL-SCNC: 25.8 MMOL/L (ref 22–29)
CREAT SERPL-MCNC: 1.13 MG/DL (ref 0.57–1)
DEPRECATED RDW RBC AUTO: 41.4 FL (ref 37–54)
ERYTHROCYTE [DISTWIDTH] IN BLOOD BY AUTOMATED COUNT: 12.5 % (ref 12.3–15.4)
GFR SERPL CREATININE-BSD FRML MDRD: 49 ML/MIN/1.73
GLUCOSE SERPL-MCNC: 90 MG/DL (ref 65–99)
HCT VFR BLD AUTO: 43.5 % (ref 34–46.6)
HGB BLD-MCNC: 14.6 G/DL (ref 12–15.9)
MCH RBC QN AUTO: 30.3 PG (ref 26.6–33)
MCHC RBC AUTO-ENTMCNC: 33.6 G/DL (ref 31.5–35.7)
MCV RBC AUTO: 90.2 FL (ref 79–97)
PLATELET # BLD AUTO: 257 10*3/MM3 (ref 140–450)
PMV BLD AUTO: 10.3 FL (ref 6–12)
POTASSIUM SERPL-SCNC: 4.2 MMOL/L (ref 3.5–5.2)
RBC # BLD AUTO: 4.82 10*6/MM3 (ref 3.77–5.28)
SODIUM SERPL-SCNC: 139 MMOL/L (ref 136–145)
T4 SERPL-MCNC: 7.29 MCG/DL (ref 4.5–11.7)
TSH SERPL DL<=0.05 MIU/L-ACNC: 2.36 UIU/ML (ref 0.27–4.2)
WBC # BLD AUTO: 6.72 10*3/MM3 (ref 3.4–10.8)

## 2021-06-30 PROCEDURE — 85027 COMPLETE CBC AUTOMATED: CPT

## 2021-06-30 PROCEDURE — 84436 ASSAY OF TOTAL THYROXINE: CPT

## 2021-06-30 PROCEDURE — 84443 ASSAY THYROID STIM HORMONE: CPT

## 2021-06-30 PROCEDURE — 80048 BASIC METABOLIC PNL TOTAL CA: CPT

## 2021-06-30 PROCEDURE — 36415 COLL VENOUS BLD VENIPUNCTURE: CPT

## 2021-08-25 ENCOUNTER — LAB (OUTPATIENT)
Dept: UROLOGY | Facility: CLINIC | Age: 61
End: 2021-08-25

## 2021-08-25 DIAGNOSIS — N39.0 URINARY TRACT INFECTION WITHOUT HEMATURIA, SITE UNSPECIFIED: ICD-10-CM

## 2021-08-25 DIAGNOSIS — R35.0 FREQUENCY OF URINATION: Primary | ICD-10-CM

## 2021-08-25 LAB
BILIRUB BLD-MCNC: NEGATIVE MG/DL
CLARITY, POC: CLEAR
COLOR UR: YELLOW
GLUCOSE UR STRIP-MCNC: NEGATIVE MG/DL
KETONES UR QL: NEGATIVE
LEUKOCYTE EST, POC: ABNORMAL
NITRITE UR-MCNC: NEGATIVE MG/ML
PH UR: 6 [PH] (ref 5–8)
PROT UR STRIP-MCNC: NEGATIVE MG/DL
RBC # UR STRIP: NEGATIVE /UL
SP GR UR: 1.01 (ref 1–1.03)
UROBILINOGEN UR QL: NORMAL

## 2021-08-25 PROCEDURE — 81003 URINALYSIS AUTO W/O SCOPE: CPT | Performed by: NURSE PRACTITIONER

## 2021-08-25 PROCEDURE — 87086 URINE CULTURE/COLONY COUNT: CPT | Performed by: NURSE PRACTITIONER

## 2021-08-27 ENCOUNTER — TELEPHONE (OUTPATIENT)
Dept: GASTROENTEROLOGY | Facility: CLINIC | Age: 61
End: 2021-08-27

## 2021-08-27 LAB — BACTERIA SPEC AEROBE CULT: ABNORMAL

## 2021-08-27 NOTE — TELEPHONE ENCOUNTER
Patient called back and patient does not have a infection per urine culture. Patient verbalized understanding.

## 2021-10-12 DIAGNOSIS — N39.0 URINARY TRACT INFECTION WITHOUT HEMATURIA, SITE UNSPECIFIED: ICD-10-CM

## 2021-10-12 DIAGNOSIS — N39.41 URGENCY INCONTINENCE: ICD-10-CM

## 2021-10-12 RX ORDER — TROSPIUM CHLORIDE ER 60 MG/1
60 CAPSULE ORAL DAILY
Qty: 30 CAPSULE | Refills: 11 | Status: SHIPPED | OUTPATIENT
Start: 2021-10-12 | End: 2022-11-03

## 2022-08-01 ENCOUNTER — OFFICE VISIT (OUTPATIENT)
Dept: UROLOGY | Facility: CLINIC | Age: 62
End: 2022-08-01

## 2022-08-01 VITALS — WEIGHT: 198 LBS | HEIGHT: 67 IN | BODY MASS INDEX: 31.08 KG/M2

## 2022-08-01 DIAGNOSIS — N32.81 DETRUSOR INSTABILITY: ICD-10-CM

## 2022-08-01 DIAGNOSIS — R35.0 FREQUENCY OF URINATION: Primary | ICD-10-CM

## 2022-08-01 LAB
BILIRUB BLD-MCNC: NEGATIVE MG/DL
CLARITY, POC: CLEAR
COLOR UR: YELLOW
EXPIRATION DATE: ABNORMAL
GLUCOSE UR STRIP-MCNC: NEGATIVE MG/DL
KETONES UR QL: NEGATIVE
LEUKOCYTE EST, POC: ABNORMAL
Lab: ABNORMAL
NITRITE UR-MCNC: NEGATIVE MG/ML
PH UR: 6 [PH] (ref 5–8)
PROT UR STRIP-MCNC: NEGATIVE MG/DL
RBC # UR STRIP: NEGATIVE /UL
SP GR UR: 1.03 (ref 1–1.03)
UROBILINOGEN UR QL: NORMAL

## 2022-08-01 PROCEDURE — 99213 OFFICE O/P EST LOW 20 MIN: CPT | Performed by: UROLOGY

## 2022-08-01 PROCEDURE — 52000 CYSTOURETHROSCOPY: CPT | Performed by: UROLOGY

## 2022-08-01 PROCEDURE — 87086 URINE CULTURE/COLONY COUNT: CPT | Performed by: UROLOGY

## 2022-08-01 NOTE — PROGRESS NOTES
Chief Complaint:      Chief Complaint   Patient presents with   • Urinary Frequency       HPI:   62 y.o. female referred for evaluation.  Dr. De La Cruz did a cryosurgical ablation.  Apparently the kidneys are doing fine she has recurrent urinary tract infections Dr. Benito put her on trospium unsuccessfully I Marissa set her up for lower tract investigation    Past Medical History:     Past Medical History:   Diagnosis Date   • B12 deficiency    • Fatigue    • GERD (gastroesophageal reflux disease)    • Hyperlipidemia    • Hypertension        Current Meds:     Current Outpatient Medications   Medication Sig Dispense Refill   • atorvastatin (LIPITOR) 20 MG tablet TAKE 1 TABLET BY MOUTH DAILY 90 tablet 3   • cholecalciferol (VITAMIN D3) 1000 UNITS tablet Take 1,000 Units by mouth daily.     • cyclobenzaprine (FLEXERIL) 5 MG tablet Take 1 tablet by mouth Every 8 (Eight) Hours As Needed for Muscle Spasms. 15 tablet 0   • etodolac (LODINE) 400 MG tablet Take 1 tablet by mouth 2 (Two) Times a Day. 20 tablet 0   • lisinopril (PRINIVIL,ZESTRIL) 20 MG tablet Take 1 tablet by mouth 2 (Two) Times a Day. 180 tablet 3   • ondansetron (Zofran) 4 MG tablet Take 1 tablet by mouth Every 12 (Twelve) Hours As Needed for Nausea or Vomiting. 30 tablet 0   • pantoprazole (PROTONIX) 40 MG EC tablet TAKE 1 TABLET BY MOUTH DAILY 30 tablet 0   • phenazopyridine (PYRIDIUM) 100 MG tablet 100 mg.     • promethazine (PHENERGAN) 25 MG tablet Take 1 tablet by mouth Every 6 (Six) Hours As Needed for Nausea or Vomiting. 12 tablet 0   • sertraline (ZOLOFT) 50 MG tablet Take 1 tablet by mouth Daily. 90 tablet 3   • tamsulosin (Flomax) 0.4 MG capsule 24 hr capsule Take 1 capsule by mouth Every Night. 30 capsule 1   • trimethoprim (TRIMPEX) 100 MG tablet Take 1 tablet nightly 30 tablet 11   • trospium 60 MG capsule sustained-release 24 hr capsule Take 1 capsule by mouth Daily. 30 capsule 11     No current facility-administered medications for this visit.         Allergies:      No Known Allergies     Past Surgical History:     Past Surgical History:   Procedure Laterality Date   • CARDIOVASCULAR STRESS TEST  2017   •  SECTION     • ECHO - CONVERTED  2017   • KIDNEY SURGERY         Social History:     Social History     Socioeconomic History   • Marital status:    Tobacco Use   • Smoking status: Never Smoker   • Smokeless tobacco: Never Used   Substance and Sexual Activity   • Alcohol use: No   • Drug use: No   • Sexual activity: Defer       Family History:     Family History   Problem Relation Age of Onset   • Hyperlipidemia Mother    • Hypertension Mother    • Heart attack Father 65   • Cancer Father    • Hyperlipidemia Brother    • Hypertension Brother        Review of Systems:     Review of Systems   Constitutional: Negative.  Negative for activity change, appetite change, chills, diaphoresis, fatigue and unexpected weight change.   HENT: Negative for congestion, dental problem, drooling, ear discharge, ear pain, facial swelling, hearing loss, mouth sores, nosebleeds, postnasal drip, rhinorrhea, sinus pressure, sneezing, sore throat, tinnitus, trouble swallowing and voice change.    Eyes: Negative.  Negative for photophobia, pain, discharge, redness, itching and visual disturbance.   Respiratory: Negative.  Negative for apnea, cough, choking, chest tightness, shortness of breath, wheezing and stridor.    Cardiovascular: Negative.  Negative for chest pain, palpitations and leg swelling.   Gastrointestinal: Negative.  Negative for abdominal distention, abdominal pain, anal bleeding, blood in stool, constipation, diarrhea, nausea, rectal pain and vomiting.   Endocrine: Negative.  Negative for cold intolerance, heat intolerance, polydipsia, polyphagia and polyuria.   Musculoskeletal: Negative.  Negative for arthralgias, back pain, gait problem, joint swelling, myalgias, neck pain and neck stiffness.   Skin: Negative.  Negative for color change,  pallor, rash and wound.   Allergic/Immunologic: Negative.  Negative for environmental allergies, food allergies and immunocompromised state.   Neurological: Negative.  Negative for dizziness, tremors, seizures, syncope, facial asymmetry, speech difficulty, weakness, light-headedness, numbness and headaches.   Hematological: Negative.  Negative for adenopathy. Does not bruise/bleed easily.   Psychiatric/Behavioral: Negative for agitation, behavioral problems, confusion, decreased concentration, dysphoric mood, hallucinations, self-injury, sleep disturbance and suicidal ideas. The patient is not nervous/anxious and is not hyperactive.    All other systems reviewed and are negative.      Physical Exam:     Physical Exam  Constitutional:       Appearance: She is well-developed.   HENT:      Head: Normocephalic and atraumatic.      Right Ear: External ear normal.      Left Ear: External ear normal.   Eyes:      Conjunctiva/sclera: Conjunctivae normal.      Pupils: Pupils are equal, round, and reactive to light.   Cardiovascular:      Rate and Rhythm: Normal rate and regular rhythm.      Heart sounds: Normal heart sounds.   Pulmonary:      Effort: Pulmonary effort is normal.      Breath sounds: Normal breath sounds.   Abdominal:      General: Bowel sounds are normal. There is no distension.      Palpations: Abdomen is soft. There is no mass.      Tenderness: There is no abdominal tenderness. There is no guarding or rebound.   Genitourinary:     Vagina: No vaginal discharge.   Musculoskeletal:         General: Normal range of motion.   Skin:     General: Skin is warm and dry.   Neurological:      Mental Status: She is alert.      Deep Tendon Reflexes: Reflexes are normal and symmetric.   Psychiatric:         Behavior: Behavior normal.         Thought Content: Thought content normal.         Judgment: Judgment normal.         I have reviewed the following portions of the patient's history: Allergies, current medications,  past family history, past medical history, past social history, past surgical history, problem list, and ROS and confirm it is accurate.    Recent Image (CT and/or KUB):      CT Abdomen and Pelvis: Results for orders placed during the hospital encounter of 11/10/20    CT Abdomen Pelvis With & Without Contrast    Narrative  EXAM:  CT Abdomen and Pelvis Without and With Intravenous Contrast    EXAM DATE:  11/10/2020 10:12 AM    CLINICAL HISTORY:  Flank pain, kidney stone suspected; R10.9-Unspecified abdominal pain;  Z87.442-Personal history of urinary calculi    TECHNIQUE:  Axial computed tomography images of the abdomen and pelvis without and  with intravenous contrast.  Sagittal and coronal reformatted images were  created and reviewed.  This CT exam was performed using one or more of  the following dose reduction techniques:  automated exposure control,  adjustment of the mA and/or kV according to patient size, and/or use of  iterative reconstruction technique.    COMPARISON:  01/23/2019, 02/09/2019    FINDINGS:  Lung bases:  Unremarkable.  No mass.  No consolidation.    ABDOMEN:  Liver:  Unremarkable.  No mass.  Gallbladder and bile ducts:  Unremarkable.  No calcified stones.  No  ductal dilation.  Pancreas:  Unremarkable.  No mass.  No ductal dilation.  Spleen:  Unremarkable.  No splenomegaly.  Adrenals:  Unremarkable.  No mass.  Kidneys and ureters:  Again seen are changes of partial nephrectomy  left kidney.  Surgical clips of the right kidney are stable from the  previous exam. This may be due to changes of prior partial nephrectomy  as well.  There are no ureteral stones identified.  There are multiple  nonobstructing left renal collecting system stones noted in the lower  pole region.  On coronal and sagittal sequences, there is a 1.7 x 1.8 x  1.4 cm somewhat exophytic cortical irregularity involving the posterior  inferior aspect of the left kidney with heterogeneous enhancement  pattern that appears to be  in a separate location from the nephrectomy  margins and could represent small neoplasm versus altered perfusion  dynamics from patient's previous surgery. Additionally, this does not  appear to be present on 02/09/2009 contrast-enhanced exam raising  suspicion for primary renal neoplasm.  Stomach and bowel:  Mild constipation is noted.  No obstruction.  No  mucosal thickening.    PELVIS:  Appendix:  Appendix not visualized corresponding to history of  probable appendectomy given surgical sutures in this location.  Bladder:  Unremarkable.  No mass.  No stones.  Reproductive:  Hysterectomy.    ABDOMEN and PELVIS:  Intraperitoneal space:  Unremarkable.  No free air.  No significant  fluid collection.  Bones/joints:  Degenerative changes lower lumbar spine with mild  stenosis stable from the prior exam. No acute bony findings identified.  No dislocation.  Soft tissues:  Unremarkable.  Vasculature:  Unremarkable.  No abdominal aortic aneurysm.  Lymph nodes:  Unremarkable.  No enlarged lymph nodes.    Impression  1.  1.7 x 1.8 x 1.4 cm exophytic cortical irregularity of the left  kidney posterior inferior aspect that has developed since the 2009  contrast-enhanced exam with enhancement features raising suspicion for  primary neoplasm.  2.  Bilateral partial nephrectomy changes given presence of surgical  clips.  3.  No hydronephrosis or obstructing stones.  4.  Other incidental/nonacute findings as above which appear stable from  the previous exam.    This report was finalized on 11/10/2020 10:59 AM by Dr. Randolph Ramirez MD.       CT Stone Protocol: Results for orders placed during the hospital encounter of 01/23/19    CT Abdomen Pelvis Stone Protocol    Narrative  CT ABDOMEN PELVIS STONE PROTOCOL    CLINICAL INDICATION: Stones in kidney and possibly in ureter;  N20.0-Calculus of kidney; N20.1-Calculus of ureter.    COMPARISON: 05/17/2018    TECHNIQUE: Axial images were acquired from the lung bases through the  pubic  symphysis without any IV or oral contrast.  Reformatted images were created in both the coronal and sagittal planes.    Radiation dose reduction techniques were utilized per ALARA protocol.  Automated exposure control was initiated through either or [employer] or  [employer] software packages by  protocol.      FINDINGS:  The lung bases are clear. There are no pleural effusions.    The liver is homogeneous. There is no evidence of focal hepatic mass.    The spleen is homogeneous.    Again mildly indistinct appearance of the pancreatic head but stable.    There is no adrenal enlargement.    Bilateral renal calcifications are present and similar to the previous  exam. No hydronephrosis..    Otherwise I do not see any free fluid or walled off fluid collections.    There is no bowel wall thickening or mesenteric stranding.    There is no evidence of mesenteric or retroperitoneal adenopathy.    Arthritic change in the spine.    Impression  Overall no significant interval change since the prior  study.          This report was finalized on 1/23/2019 2:08 PM by Dr. Easton Black MD.       KUB: Results for orders placed during the hospital encounter of 10/29/20    XR Abdomen KUB    Narrative  EXAM:  XR Abdomen, 1 View    EXAM DATE:  10/29/2020 10:30 AM    CLINICAL HISTORY:  left flank pain; R10.9-Unspecified abdominal pain; Z87.442-Personal  history of urinary calculi    TECHNIQUE:  Frontal supine view of the abdomen/pelvis.    COMPARISON:  11/15/2019    FINDINGS:  Intraperitoneal space:  Calcifications are noted in the right upper  quadrant are fairly small.  Gastrointestinal tract:  Unremarkable.  No dilation.  Organs:  Calcifications are noted in region of left kidney measuring  about 2.4 cm.  Bones/joints:  Unremarkable.  Vasculature:  Calcifications noted in the pelvis are probably  vascular.  Other findings:  Left upper quadrant calcification measuring 6.5 mm is  noted and could be in course of the  ureter.    Impression  1.  Calcifications are noted in region of left kidney measuring about  2.4 cm.  2.  Left upper quadrant calcification measuring 6.5 mm is noted and  could be in course of the ureter.  3.  Calcifications are noted in the right upper quadrant are fairly  small.    This report was finalized on 10/29/2020 10:38 AM by Dr. Omar Elmore MD.       Labs (past 3 months):      No visits with results within 3 Month(s) from this visit.   Latest known visit with results is:   Lab on 08/25/2021   Component Date Value Ref Range Status   • Color 08/25/2021 Yellow  Yellow, Straw, Dark Yellow, Gris Final   • Clarity, UA 08/25/2021 Clear  Clear Final   • Specific Gravity  08/25/2021 1.010  1.005 - 1.030 Final   • pH, Urine 08/25/2021 6.0  5.0 - 8.0 Final   • Leukocytes 08/25/2021 Moderate (2+) (A) Negative Final   • Nitrite, UA 08/25/2021 Negative  Negative Final   • Protein, POC 08/25/2021 Negative  Negative mg/dL Final   • Glucose, UA 08/25/2021 Negative  Negative, 1000 mg/dL (3+) mg/dL Final   • Ketones, UA 08/25/2021 Negative  Negative Final   • Urobilinogen, UA 08/25/2021 Normal  Normal Final   • Bilirubin 08/25/2021 Negative  Negative Final   • Blood, UA 08/25/2021 Negative  Negative Final   • Urine Culture 08/25/2021 50,000 CFU/mL Mixed Gram Positive Sandy (A)  Final        Procedure:       Assessment/Plan:   Renal neoplasm-status post successful cryosurgical ablation  Cystoscopy and urethral dilation-after an appropriate informed consent, the patient was brought to the procedure suite.  The urethra was gently anesthetized with 10 mL of 2% viscous Xylocaine jelly.  After an adequate period of topical anesthesia I went ahead and advanced the cystoscope.  There was an obvious stenosis present.  The bladder was unremarkable.  There were no stones, tumors, foreign bodies or abnormalities.  The ureteral orifices were normal in position and configuration.  The scope was removed and the urethra was gently  anesthetized and dilated with Elaina sounds from 16 to 30 Bengali sequentially without complication. The patient was given gentamicin as prophylaxis with 80 mg.  Recurrent urinary tract infections-patient has been referred and diagnosed with recurrent urinary tract infections.  We discussed the types of organisms that are found in the urinary tract indicating that the vast majority are results of the patient's own gastrointestinal tavo.  We discussed how many of the antibiotics that are utilized can actually exacerbate these infections by creating resistant organisms and there is only very few antibiotics that are concentrated in the urine and do not affect the rectal reservoir nor cause recurrent yeast vaginitis.  We discussed the risk factors for recurrent infections being intercourse in younger patients and atrophic changes in older patients.  We discussed the symptoms that are found including pain, pressure, burning, frequency, urgency, suprapubic pain, and painful intercourse.  I discussed upper tract symptoms including fevers and chills and indicated the workup would be much more aggressive if the patient were to present with recurrent infections in the face of upper tract symptomatology such as fever.  I discussed the history of vesicoureteral reflux in young patients and finally chronic renal scarring as a result of such.  I recommend concomitant probiotics with treatment with antibiotics to protect the rectal reservoir including over-the-counter yogurt preparations to jayy oral pills containing the appropriate probiotics.    Patient reports that she is not currently experiencing any symptoms of urinary incontinence.                    This document has been electronically signed by CHELSIE BARRERA MD August 1, 2022 14:56 EDT    Dictated Utilizing Dragon Dictation: Part of this note may be an electronic transcription/translation of spoken language to printed text using the Dragon Dictation System.

## 2022-08-02 LAB — BACTERIA SPEC AEROBE CULT: NORMAL

## 2022-08-06 PROBLEM — N32.81 DETRUSOR INSTABILITY: Status: ACTIVE | Noted: 2022-08-06

## 2022-08-16 ENCOUNTER — TRANSCRIBE ORDERS (OUTPATIENT)
Dept: CARDIOLOGY | Facility: HOSPITAL | Age: 62
End: 2022-08-16

## 2022-08-16 ENCOUNTER — TRANSCRIBE ORDERS (OUTPATIENT)
Dept: ADMINISTRATIVE | Facility: HOSPITAL | Age: 62
End: 2022-08-16

## 2022-08-16 DIAGNOSIS — R07.9 CHEST PAIN, UNSPECIFIED TYPE: Primary | ICD-10-CM

## 2022-09-19 ENCOUNTER — HOSPITAL ENCOUNTER (OUTPATIENT)
Dept: CARDIOLOGY | Facility: HOSPITAL | Age: 62
Discharge: HOME | End: 2022-09-19

## 2022-09-19 VITALS
BODY MASS INDEX: 31.08 KG/M2 | DIASTOLIC BLOOD PRESSURE: 92 MMHG | HEIGHT: 67 IN | SYSTOLIC BLOOD PRESSURE: 144 MMHG | WEIGHT: 198 LBS | HEART RATE: 76 BPM

## 2022-09-19 DIAGNOSIS — R07.9 CHEST PAIN, UNSPECIFIED TYPE: ICD-10-CM

## 2022-09-19 PROCEDURE — A9500 TC99M SESTAMIBI: HCPCS | Performed by: INTERNAL MEDICINE

## 2022-09-19 PROCEDURE — 78452 HT MUSCLE IMAGE SPECT MULT: CPT

## 2022-09-19 PROCEDURE — 0 TECHNETIUM SESTAMIBI: Performed by: INTERNAL MEDICINE

## 2022-09-19 PROCEDURE — 93017 CV STRESS TEST TRACING ONLY: CPT

## 2022-09-19 RX ADMIN — TECHNETIUM TC 99M SESTAMIBI 1 DOSE: 1 INJECTION INTRAVENOUS at 13:20

## 2022-09-19 RX ADMIN — TECHNETIUM TC 99M SESTAMIBI 1 DOSE: 1 INJECTION INTRAVENOUS at 10:55

## 2022-09-20 LAB
BH CV REST NUCLEAR ISOTOPE DOSE: 9.9 MCI
BH CV STRESS BP STAGE 1: NORMAL
BH CV STRESS BP STAGE 2: NORMAL
BH CV STRESS DURATION MIN STAGE 1: 3
BH CV STRESS DURATION MIN STAGE 2: 3
BH CV STRESS DURATION MIN STAGE 3: 1
BH CV STRESS DURATION SEC STAGE 1: 0
BH CV STRESS DURATION SEC STAGE 2: 0
BH CV STRESS DURATION SEC STAGE 3: 21
BH CV STRESS GRADE STAGE 1: 10
BH CV STRESS GRADE STAGE 2: 12
BH CV STRESS GRADE STAGE 3: 14
BH CV STRESS HR STAGE 1: 102
BH CV STRESS HR STAGE 2: 125
BH CV STRESS HR STAGE 3: 136
BH CV STRESS METS STAGE 1: 5
BH CV STRESS METS STAGE 2: 7.5
BH CV STRESS METS STAGE 3: 10
BH CV STRESS NUCLEAR ISOTOPE DOSE: 31.3 MCI
BH CV STRESS O2 STAGE 1: 97
BH CV STRESS O2 STAGE 2: 97
BH CV STRESS O2 STAGE 3: 99
BH CV STRESS PROTOCOL 1: NORMAL
BH CV STRESS RECOVERY BP: NORMAL MMHG
BH CV STRESS RECOVERY HR: 81 BPM
BH CV STRESS RECOVERY O2: 98 %
BH CV STRESS SPEED STAGE 1: 1.7
BH CV STRESS SPEED STAGE 2: 2.5
BH CV STRESS SPEED STAGE 3: 3.4
BH CV STRESS STAGE 1: 1
BH CV STRESS STAGE 2: 2
BH CV STRESS STAGE 3: 3
LV EF NUC BP: 83 %
MAXIMAL PREDICTED HEART RATE: 156 BPM
PERCENT MAX PREDICTED HR: 87.82 %
STRESS BASELINE BP: NORMAL MMHG
STRESS BASELINE HR: 64 BPM
STRESS O2 SAT REST: 98 %
STRESS PERCENT HR: 103 %
STRESS POST ESTIMATED WORKLOAD: 7.6 METS
STRESS POST EXERCISE DUR MIN: 7 MIN
STRESS POST EXERCISE DUR SEC: 21 SEC
STRESS POST O2 SAT PEAK: 97 %
STRESS POST PEAK BP: NORMAL MMHG
STRESS POST PEAK HR: 137 BPM
STRESS TARGET HR: 133 BPM

## 2022-09-30 ENCOUNTER — TELEPHONE (OUTPATIENT)
Dept: UROLOGY | Facility: CLINIC | Age: 62
End: 2022-09-30

## 2022-10-03 NOTE — TELEPHONE ENCOUNTER
I called the pt and she said that she went to urgent care and they put her on Macrobid and she has the OTC pyridium and is taking both and wants to proceed with her cysto on Thursday.

## 2022-10-06 ENCOUNTER — PROCEDURE VISIT (OUTPATIENT)
Dept: UROLOGY | Facility: CLINIC | Age: 62
End: 2022-10-06

## 2022-10-06 VITALS — WEIGHT: 198 LBS | BODY MASS INDEX: 31.08 KG/M2 | HEIGHT: 67 IN

## 2022-10-06 DIAGNOSIS — Z48.816 AFTERCARE FOLLOWING SURGERY OF THE GENITOURINARY SYSTEM: Primary | ICD-10-CM

## 2022-10-06 DIAGNOSIS — N32.81 DETRUSOR INSTABILITY: ICD-10-CM

## 2022-10-06 PROCEDURE — 52000 CYSTOURETHROSCOPY: CPT | Performed by: UROLOGY

## 2022-10-06 RX ORDER — GENTAMICIN SULFATE 40 MG/ML
80 INJECTION, SOLUTION INTRAMUSCULAR; INTRAVENOUS ONCE
Status: COMPLETED | OUTPATIENT
Start: 2022-10-06 | End: 2022-10-06

## 2022-10-06 RX ADMIN — GENTAMICIN SULFATE 80 MG: 40 INJECTION, SOLUTION INTRAMUSCULAR; INTRAVENOUS at 13:58

## 2022-10-06 NOTE — PROGRESS NOTES
Chief Complaint:      Chief Complaint   Patient presents with   • Urinary Frequency       HPI:   62 y.o. female there for lower tract investigation    Past Medical History:     Past Medical History:   Diagnosis Date   • B12 deficiency    • Fatigue    • GERD (gastroesophageal reflux disease)    • Hyperlipidemia    • Hypertension        Current Meds:     Current Outpatient Medications   Medication Sig Dispense Refill   • atorvastatin (LIPITOR) 20 MG tablet TAKE 1 TABLET BY MOUTH DAILY 90 tablet 3   • cholecalciferol (VITAMIN D3) 1000 UNITS tablet Take 1,000 Units by mouth daily.     • cyclobenzaprine (FLEXERIL) 5 MG tablet Take 1 tablet by mouth Every 8 (Eight) Hours As Needed for Muscle Spasms. 15 tablet 0   • etodolac (LODINE) 400 MG tablet Take 1 tablet by mouth 2 (Two) Times a Day. 20 tablet 0   • lisinopril (PRINIVIL,ZESTRIL) 20 MG tablet Take 1 tablet by mouth 2 (Two) Times a Day. 180 tablet 3   • ondansetron (Zofran) 4 MG tablet Take 1 tablet by mouth Every 12 (Twelve) Hours As Needed for Nausea or Vomiting. 30 tablet 0   • pantoprazole (PROTONIX) 40 MG EC tablet TAKE 1 TABLET BY MOUTH DAILY 30 tablet 0   • phenazopyridine (PYRIDIUM) 100 MG tablet 100 mg.     • promethazine (PHENERGAN) 25 MG tablet Take 1 tablet by mouth Every 6 (Six) Hours As Needed for Nausea or Vomiting. 12 tablet 0   • sertraline (ZOLOFT) 50 MG tablet Take 1 tablet by mouth Daily. 90 tablet 3   • tamsulosin (Flomax) 0.4 MG capsule 24 hr capsule Take 1 capsule by mouth Every Night. 30 capsule 1   • trimethoprim (TRIMPEX) 100 MG tablet Take 1 tablet nightly 30 tablet 11   • trospium 60 MG capsule sustained-release 24 hr capsule Take 1 capsule by mouth Daily. 30 capsule 11     No current facility-administered medications for this visit.        Allergies:      No Known Allergies     Past Surgical History:     Past Surgical History:   Procedure Laterality Date   • CARDIOVASCULAR STRESS TEST  2017   •  SECTION     • ECHO - CONVERTED   06/14/2017   • KIDNEY SURGERY         Social History:     Social History     Socioeconomic History   • Marital status:    Tobacco Use   • Smoking status: Never Smoker   • Smokeless tobacco: Never Used   Substance and Sexual Activity   • Alcohol use: No   • Drug use: No   • Sexual activity: Defer       Family History:     Family History   Problem Relation Age of Onset   • Hyperlipidemia Mother    • Hypertension Mother    • Heart attack Father 65   • Cancer Father    • Hyperlipidemia Brother    • Hypertension Brother        Review of Systems:     Review of Systems   Constitutional: Negative.  Negative for activity change, appetite change, chills, diaphoresis, fatigue and unexpected weight change.   HENT: Negative for congestion, dental problem, drooling, ear discharge, ear pain, facial swelling, hearing loss, mouth sores, nosebleeds, postnasal drip, rhinorrhea, sinus pressure, sneezing, sore throat, tinnitus, trouble swallowing and voice change.    Eyes: Negative.  Negative for photophobia, pain, discharge, redness, itching and visual disturbance.   Respiratory: Negative.  Negative for apnea, cough, choking, chest tightness, shortness of breath, wheezing and stridor.    Cardiovascular: Negative.  Negative for chest pain, palpitations and leg swelling.   Gastrointestinal: Negative.  Negative for abdominal distention, abdominal pain, anal bleeding, blood in stool, constipation, diarrhea, nausea, rectal pain and vomiting.   Endocrine: Negative.  Negative for cold intolerance, heat intolerance, polydipsia, polyphagia and polyuria.   Musculoskeletal: Negative.  Negative for arthralgias, back pain, gait problem, joint swelling, myalgias, neck pain and neck stiffness.   Skin: Negative.  Negative for color change, pallor, rash and wound.   Allergic/Immunologic: Negative.  Negative for environmental allergies, food allergies and immunocompromised state.   Neurological: Negative.  Negative for dizziness, tremors,  seizures, syncope, facial asymmetry, speech difficulty, weakness, light-headedness, numbness and headaches.   Hematological: Negative.  Negative for adenopathy. Does not bruise/bleed easily.   Psychiatric/Behavioral: Negative for agitation, behavioral problems, confusion, decreased concentration, dysphoric mood, hallucinations, self-injury, sleep disturbance and suicidal ideas. The patient is not nervous/anxious and is not hyperactive.    All other systems reviewed and are negative.      Physical Exam:     Physical Exam  Constitutional:       Appearance: She is well-developed.   HENT:      Head: Normocephalic and atraumatic.      Right Ear: External ear normal.      Left Ear: External ear normal.   Eyes:      Conjunctiva/sclera: Conjunctivae normal.      Pupils: Pupils are equal, round, and reactive to light.   Cardiovascular:      Rate and Rhythm: Normal rate and regular rhythm.      Heart sounds: Normal heart sounds.   Pulmonary:      Effort: Pulmonary effort is normal.      Breath sounds: Normal breath sounds.   Abdominal:      General: Bowel sounds are normal. There is no distension.      Palpations: Abdomen is soft. There is no mass.      Tenderness: There is no abdominal tenderness. There is no guarding or rebound.   Genitourinary:     General: Normal vulva.      Vagina: No vaginal discharge.   Musculoskeletal:         General: Normal range of motion.   Skin:     General: Skin is warm and dry.   Neurological:      Mental Status: She is alert.      Deep Tendon Reflexes: Reflexes are normal and symmetric.   Psychiatric:         Behavior: Behavior normal.         Thought Content: Thought content normal.         Judgment: Judgment normal.         I have reviewed the following portions of the patient's history: Allergies, current medications, past family history, past medical history, past social history, past surgical history, problem list, and ROS and confirm it is accurate.      Recent Image (CT and/or KUB):       CT Abdomen and Pelvis: Results for orders placed during the hospital encounter of 11/10/20    CT Abdomen Pelvis With & Without Contrast    Narrative  EXAM:  CT Abdomen and Pelvis Without and With Intravenous Contrast    EXAM DATE:  11/10/2020 10:12 AM    CLINICAL HISTORY:  Flank pain, kidney stone suspected; R10.9-Unspecified abdominal pain;  Z87.442-Personal history of urinary calculi    TECHNIQUE:  Axial computed tomography images of the abdomen and pelvis without and  with intravenous contrast.  Sagittal and coronal reformatted images were  created and reviewed.  This CT exam was performed using one or more of  the following dose reduction techniques:  automated exposure control,  adjustment of the mA and/or kV according to patient size, and/or use of  iterative reconstruction technique.    COMPARISON:  01/23/2019, 02/09/2019    FINDINGS:  Lung bases:  Unremarkable.  No mass.  No consolidation.    ABDOMEN:  Liver:  Unremarkable.  No mass.  Gallbladder and bile ducts:  Unremarkable.  No calcified stones.  No  ductal dilation.  Pancreas:  Unremarkable.  No mass.  No ductal dilation.  Spleen:  Unremarkable.  No splenomegaly.  Adrenals:  Unremarkable.  No mass.  Kidneys and ureters:  Again seen are changes of partial nephrectomy  left kidney.  Surgical clips of the right kidney are stable from the  previous exam. This may be due to changes of prior partial nephrectomy  as well.  There are no ureteral stones identified.  There are multiple  nonobstructing left renal collecting system stones noted in the lower  pole region.  On coronal and sagittal sequences, there is a 1.7 x 1.8 x  1.4 cm somewhat exophytic cortical irregularity involving the posterior  inferior aspect of the left kidney with heterogeneous enhancement  pattern that appears to be in a separate location from the nephrectomy  margins and could represent small neoplasm versus altered perfusion  dynamics from patient's previous surgery. Additionally,  this does not  appear to be present on 02/09/2009 contrast-enhanced exam raising  suspicion for primary renal neoplasm.  Stomach and bowel:  Mild constipation is noted.  No obstruction.  No  mucosal thickening.    PELVIS:  Appendix:  Appendix not visualized corresponding to history of  probable appendectomy given surgical sutures in this location.  Bladder:  Unremarkable.  No mass.  No stones.  Reproductive:  Hysterectomy.    ABDOMEN and PELVIS:  Intraperitoneal space:  Unremarkable.  No free air.  No significant  fluid collection.  Bones/joints:  Degenerative changes lower lumbar spine with mild  stenosis stable from the prior exam. No acute bony findings identified.  No dislocation.  Soft tissues:  Unremarkable.  Vasculature:  Unremarkable.  No abdominal aortic aneurysm.  Lymph nodes:  Unremarkable.  No enlarged lymph nodes.    Impression  1.  1.7 x 1.8 x 1.4 cm exophytic cortical irregularity of the left  kidney posterior inferior aspect that has developed since the 2009  contrast-enhanced exam with enhancement features raising suspicion for  primary neoplasm.  2.  Bilateral partial nephrectomy changes given presence of surgical  clips.  3.  No hydronephrosis or obstructing stones.  4.  Other incidental/nonacute findings as above which appear stable from  the previous exam.    This report was finalized on 11/10/2020 10:59 AM by Dr. Randolph Ramirez MD.       CT Stone Protocol: Results for orders placed during the hospital encounter of 01/23/19    CT Abdomen Pelvis Stone Protocol    Narrative  CT ABDOMEN PELVIS STONE PROTOCOL    CLINICAL INDICATION: Stones in kidney and possibly in ureter;  N20.0-Calculus of kidney; N20.1-Calculus of ureter.    COMPARISON: 05/17/2018    TECHNIQUE: Axial images were acquired from the lung bases through the  pubic symphysis without any IV or oral contrast.  Reformatted images were created in both the coronal and sagittal planes.    Radiation dose reduction techniques were utilized  per ALARA protocol.  Automated exposure control was initiated through either or [employer] or  [employer] software packages by  protocol.      FINDINGS:  The lung bases are clear. There are no pleural effusions.    The liver is homogeneous. There is no evidence of focal hepatic mass.    The spleen is homogeneous.    Again mildly indistinct appearance of the pancreatic head but stable.    There is no adrenal enlargement.    Bilateral renal calcifications are present and similar to the previous  exam. No hydronephrosis..    Otherwise I do not see any free fluid or walled off fluid collections.    There is no bowel wall thickening or mesenteric stranding.    There is no evidence of mesenteric or retroperitoneal adenopathy.    Arthritic change in the spine.    Impression  Overall no significant interval change since the prior  study.          This report was finalized on 1/23/2019 2:08 PM by Dr. Easton Black MD.       KUB: Results for orders placed during the hospital encounter of 10/29/20    XR Abdomen KUB    Narrative  EXAM:  XR Abdomen, 1 View    EXAM DATE:  10/29/2020 10:30 AM    CLINICAL HISTORY:  left flank pain; R10.9-Unspecified abdominal pain; Z87.442-Personal  history of urinary calculi    TECHNIQUE:  Frontal supine view of the abdomen/pelvis.    COMPARISON:  11/15/2019    FINDINGS:  Intraperitoneal space:  Calcifications are noted in the right upper  quadrant are fairly small.  Gastrointestinal tract:  Unremarkable.  No dilation.  Organs:  Calcifications are noted in region of left kidney measuring  about 2.4 cm.  Bones/joints:  Unremarkable.  Vasculature:  Calcifications noted in the pelvis are probably  vascular.  Other findings:  Left upper quadrant calcification measuring 6.5 mm is  noted and could be in course of the ureter.    Impression  1.  Calcifications are noted in region of left kidney measuring about  2.4 cm.  2.  Left upper quadrant calcification measuring 6.5 mm is noted and  could be  in course of the ureter.  3.  Calcifications are noted in the right upper quadrant are fairly  small.    This report was finalized on 10/29/2020 10:38 AM by Dr. Omar Elmore MD.       Labs (past 3 months):      Hospital Outpatient Visit on 09/19/2022   Component Date Value Ref Range Status   • Target HR (85%) 09/19/2022 133  bpm Final   • Max. Pred. HR (100%) 09/19/2022 156  bpm Final   • BH CV STRESS PROTOCOL 1 09/19/2022 Jose   Final   • Stage 1 09/19/2022 1   Final   • HR Stage 1 09/19/2022 102   Final   • BP Stage 1 09/19/2022 152/86   Final   • O2 Stage 1 09/19/2022 97   Final   • Duration Min Stage 1 09/19/2022 3   Final   • Duration Sec Stage 1 09/19/2022 0   Final   • Grade Stage 1 09/19/2022 10   Final   • Speed Stage 1 09/19/2022 1.7   Final   • BH CV STRESS METS STAGE 1 09/19/2022 5   Final   • Stage 2 09/19/2022 2   Final   • HR Stage 2 09/19/2022 125   Final   • BP Stage 2 09/19/2022 166/82   Final   • O2 Stage 2 09/19/2022 97   Final   • Duration Min Stage 2 09/19/2022 3   Final   • Duration Sec Stage 2 09/19/2022 0   Final   • Grade Stage 2 09/19/2022 12   Final   • Speed Stage 2 09/19/2022 2.5   Final   • BH CV STRESS METS STAGE 2 09/19/2022 7.5   Final   • Stage 3 09/19/2022 3   Final   • HR Stage 3 09/19/2022 136   Final   • O2 Stage 3 09/19/2022 99   Final   • Duration Min Stage 3 09/19/2022 1   Final   • Duration Sec Stage 3 09/19/2022 21   Final   • Grade Stage 3 09/19/2022 14   Final   • Speed Stage 3 09/19/2022 3.4   Final   • BH CV STRESS METS STAGE 3 09/19/2022 10.0   Final   • Baseline HR 09/19/2022 64  bpm Final   • Baseline BP 09/19/2022 144/92  mmHg Final   • O2 sat rest 09/19/2022 98  % Final   • Peak HR 09/19/2022 137  bpm Final   • Percent Max Pred HR 09/19/2022 87.82  % Final   • Percent Target HR 09/19/2022 103  % Final   • Peak BP 09/19/2022 190/66  mmHg Final   • O2 sat peak 09/19/2022 97  % Final   • Recovery HR 09/19/2022 81  bpm Final   • Recovery BP 09/19/2022 164/82  mmHg  Final   • Recovery O2 09/19/2022 98  % Final   • Exercise duration (min) 09/19/2022 7  min Final   • Exercise duration (sec) 09/19/2022 21  sec Final   • Estimated workload 09/19/2022 7.6  METS Final   • BH CV REST NUCLEAR ISOTOPE DOSE 09/19/2022 9.9  mCi Final   • BH CV STRESS NUCLEAR ISOTOPE DOSE 09/19/2022 31.3  mCi Final   • Nuc Stress EF 09/19/2022 83  % Final   Office Visit on 08/01/2022   Component Date Value Ref Range Status   • Color 08/01/2022 Yellow  Yellow, Straw, Dark Yellow, Gris Final   • Clarity, UA 08/01/2022 Clear  Clear Final   • Specific Gravity  08/01/2022 1.030  1.005 - 1.030 Final   • pH, Urine 08/01/2022 6.0  5.0 - 8.0 Final   • Leukocytes 08/01/2022 Small (1+) (A) Negative Final   • Nitrite, UA 08/01/2022 Negative  Negative Final   • Protein, POC 08/01/2022 Negative  Negative mg/dL Final   • Glucose, UA 08/01/2022 Negative  Negative mg/dL Final   • Ketones, UA 08/01/2022 Negative  Negative Final   • Urobilinogen, UA 08/01/2022 Normal  Normal Final   • Bilirubin 08/01/2022 Negative  Negative Final   • Blood, UA 08/01/2022 Negative  Negative Final   • Lot Number 08/01/2022 [id number]   Final   • Expiration Date 08/01/2022 [id number]   Final   • Urine Culture 08/01/2022 50,000 CFU/mL Mixed Sandy Isolated   Final        Procedure:   Cystoscopy:  Patient presents today for cystourethroscopy.  I went ahead and obtained an informed consent including the risks of anesthesia, bleeding, infection, etc.  After prepping and draping in a sterile fashion in the low dorsal lithotomy position, the urethra was gently anesthetized with 10 mL of 2% viscous Xylocaine jelly.  After an appropriate period of topical anesthesia, I used the Olympus digital 14 Yi flexible cystoscope to examine the anterior urethra which was completely normal.  The ureteral orifices were visualized and normal in position and configuration. There were no stones, tumors, or foreign bodies.  The blue light was enabled and was  negative allowing us to see small mucosal lesions. The patient was given 80 mg of gentamicin in an intramuscular fashion as prophylaxis for the cystoscopy and released from the clinic.  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  The mainstay of treatment has been tight cholinergics which basically cause the bladder to have decreased contractility.  We have discussed the side effects of these treatments including dry mouth, double vision, and increasing constipation.  Start Suzanne    Patient reports that she is not currently experiencing any symptoms of urinary incontinence.                    This document has been electronically signed by CHELSIE BARRERA MD October 6, 2022 13:24 EDT    Dictated Utilizing Dragon Dictation: Part of this note may be an electronic transcription/translation of spoken language to printed text using the Dragon Dictation System.

## 2022-10-17 ENCOUNTER — TELEPHONE (OUTPATIENT)
Dept: UROLOGY | Facility: CLINIC | Age: 62
End: 2022-10-17

## 2022-10-17 NOTE — TELEPHONE ENCOUNTER
Caller: ELISE GODOY    Relationship to patient: SELF    Best call back number: [phone]    Chief complaint: FEVER , BURNING W/URINATION    Patient directed to call 911 or go to their nearest emergency room.     Patient verbalized understanding: [x] Yes  [] No  If no, why?    Additional notes: CLINICAL LINE ADVISED FOR PT TO GO TO ER IN CASE IV ANTIBIOTICS ARE NEEDED.

## 2022-11-03 DIAGNOSIS — N39.41 URGENCY INCONTINENCE: ICD-10-CM

## 2022-11-03 RX ORDER — TROSPIUM CHLORIDE ER 60 MG/1
60 CAPSULE ORAL DAILY
Qty: 30 CAPSULE | Refills: 11 | Status: SHIPPED | OUTPATIENT
Start: 2022-11-03

## 2022-12-01 ENCOUNTER — OFFICE VISIT (OUTPATIENT)
Dept: UROLOGY | Facility: CLINIC | Age: 62
End: 2022-12-01

## 2022-12-01 VITALS — HEIGHT: 67 IN | BODY MASS INDEX: 31.11 KG/M2 | WEIGHT: 198.19 LBS

## 2022-12-01 DIAGNOSIS — N32.81 DETRUSOR INSTABILITY: Primary | ICD-10-CM

## 2022-12-01 PROCEDURE — 99213 OFFICE O/P EST LOW 20 MIN: CPT | Performed by: UROLOGY

## 2022-12-01 NOTE — PROGRESS NOTES
Chief Complaint:      Chief Complaint   Patient presents with   • Urinary Frequency       HPI:   62 y.o. female for follow-up.  She had cryosurgical ablation of I renal neoplasm she is doing great she has occasional bladder spasms.  She feels that the trospium is much better than the Gemtesa she will continue on that and see me back on an as-needed basis.    Past Medical History:     Past Medical History:   Diagnosis Date   • B12 deficiency    • Fatigue    • GERD (gastroesophageal reflux disease)    • Hyperlipidemia    • Hypertension        Current Meds:     Current Outpatient Medications   Medication Sig Dispense Refill   • atorvastatin (LIPITOR) 20 MG tablet TAKE 1 TABLET BY MOUTH DAILY 90 tablet 3   • cholecalciferol (VITAMIN D3) 1000 UNITS tablet Take 1,000 Units by mouth daily.     • cyclobenzaprine (FLEXERIL) 5 MG tablet Take 1 tablet by mouth Every 8 (Eight) Hours As Needed for Muscle Spasms. 15 tablet 0   • etodolac (LODINE) 400 MG tablet Take 1 tablet by mouth 2 (Two) Times a Day. 20 tablet 0   • lisinopril (PRINIVIL,ZESTRIL) 20 MG tablet Take 1 tablet by mouth 2 (Two) Times a Day. 180 tablet 3   • ondansetron (Zofran) 4 MG tablet Take 1 tablet by mouth Every 12 (Twelve) Hours As Needed for Nausea or Vomiting. 30 tablet 0   • pantoprazole (PROTONIX) 40 MG EC tablet TAKE 1 TABLET BY MOUTH DAILY 30 tablet 0   • phenazopyridine (PYRIDIUM) 100 MG tablet 100 mg.     • promethazine (PHENERGAN) 25 MG tablet Take 1 tablet by mouth Every 6 (Six) Hours As Needed for Nausea or Vomiting. 12 tablet 0   • sertraline (ZOLOFT) 50 MG tablet Take 1 tablet by mouth Daily. 90 tablet 3   • tamsulosin (Flomax) 0.4 MG capsule 24 hr capsule Take 1 capsule by mouth Every Night. 30 capsule 1   • trimethoprim (TRIMPEX) 100 MG tablet Take 1 tablet nightly 30 tablet 11   • trospium 60 MG capsule sustained-release 24 hr capsule TAKE 1 CAPSULE BY MOUTH DAILY 30 capsule 11     No current facility-administered medications for this visit.         Allergies:      No Known Allergies     Past Surgical History:     Past Surgical History:   Procedure Laterality Date   • CARDIOVASCULAR STRESS TEST  2017   •  SECTION     • ECHO - CONVERTED  2017   • KIDNEY SURGERY         Social History:     Social History     Socioeconomic History   • Marital status:    Tobacco Use   • Smoking status: Never   • Smokeless tobacco: Never   Substance and Sexual Activity   • Alcohol use: No   • Drug use: No   • Sexual activity: Defer       Family History:     Family History   Problem Relation Age of Onset   • Hyperlipidemia Mother    • Hypertension Mother    • Heart attack Father 65   • Cancer Father    • Hyperlipidemia Brother    • Hypertension Brother        Review of Systems:     Review of Systems   Constitutional: Negative.  Negative for activity change, appetite change, chills, diaphoresis, fatigue and unexpected weight change.   HENT: Negative for congestion, dental problem, drooling, ear discharge, ear pain, facial swelling, hearing loss, mouth sores, nosebleeds, postnasal drip, rhinorrhea, sinus pressure, sneezing, sore throat, tinnitus, trouble swallowing and voice change.    Eyes: Negative.  Negative for photophobia, pain, discharge, redness, itching and visual disturbance.   Respiratory: Negative.  Negative for apnea, cough, choking, chest tightness, shortness of breath, wheezing and stridor.    Cardiovascular: Negative.  Negative for chest pain, palpitations and leg swelling.   Gastrointestinal: Negative.  Negative for abdominal distention, abdominal pain, anal bleeding, blood in stool, constipation, diarrhea, nausea, rectal pain and vomiting.   Endocrine: Negative.  Negative for cold intolerance, heat intolerance, polydipsia, polyphagia and polyuria.   Musculoskeletal: Negative.  Negative for arthralgias, back pain, gait problem, joint swelling, myalgias, neck pain and neck stiffness.   Skin: Negative.  Negative for color change, pallor,  rash and wound.   Allergic/Immunologic: Negative.  Negative for environmental allergies, food allergies and immunocompromised state.   Neurological: Negative.  Negative for dizziness, tremors, seizures, syncope, facial asymmetry, speech difficulty, weakness, light-headedness, numbness and headaches.   Hematological: Negative.  Negative for adenopathy. Does not bruise/bleed easily.   Psychiatric/Behavioral: Negative for agitation, behavioral problems, confusion, decreased concentration, dysphoric mood, hallucinations, self-injury, sleep disturbance and suicidal ideas. The patient is not nervous/anxious and is not hyperactive.    All other systems reviewed and are negative.      Physical Exam:     Physical Exam  Constitutional:       Appearance: She is well-developed.   HENT:      Head: Normocephalic and atraumatic.      Right Ear: External ear normal.      Left Ear: External ear normal.   Eyes:      Conjunctiva/sclera: Conjunctivae normal.      Pupils: Pupils are equal, round, and reactive to light.   Cardiovascular:      Rate and Rhythm: Normal rate and regular rhythm.      Heart sounds: Normal heart sounds.   Pulmonary:      Effort: Pulmonary effort is normal.      Breath sounds: Normal breath sounds.   Abdominal:      General: Bowel sounds are normal. There is no distension.      Palpations: Abdomen is soft. There is no mass.      Tenderness: There is no abdominal tenderness. There is no guarding or rebound.   Genitourinary:     Vagina: No vaginal discharge.   Musculoskeletal:         General: Normal range of motion.   Skin:     General: Skin is warm and dry.   Neurological:      Mental Status: She is alert.      Deep Tendon Reflexes: Reflexes are normal and symmetric.   Psychiatric:         Behavior: Behavior normal.         Thought Content: Thought content normal.         Judgment: Judgment normal.         I have reviewed the following portions of the patient's history: Allergies, current medications, past  family history, past medical history, past social history, past surgical history, problem list, and ROS and confirm it is accurate.    Recent Image (CT and/or KUB):      CT Abdomen and Pelvis: Results for orders placed during the hospital encounter of 11/10/20    CT Abdomen Pelvis With & Without Contrast    Narrative  EXAM:  CT Abdomen and Pelvis Without and With Intravenous Contrast    EXAM DATE:  11/10/2020 10:12 AM    CLINICAL HISTORY:  Flank pain, kidney stone suspected; R10.9-Unspecified abdominal pain;  Z87.442-Personal history of urinary calculi    TECHNIQUE:  Axial computed tomography images of the abdomen and pelvis without and  with intravenous contrast.  Sagittal and coronal reformatted images were  created and reviewed.  This CT exam was performed using one or more of  the following dose reduction techniques:  automated exposure control,  adjustment of the mA and/or kV according to patient size, and/or use of  iterative reconstruction technique.    COMPARISON:  01/23/2019, 02/09/2019    FINDINGS:  Lung bases:  Unremarkable.  No mass.  No consolidation.    ABDOMEN:  Liver:  Unremarkable.  No mass.  Gallbladder and bile ducts:  Unremarkable.  No calcified stones.  No  ductal dilation.  Pancreas:  Unremarkable.  No mass.  No ductal dilation.  Spleen:  Unremarkable.  No splenomegaly.  Adrenals:  Unremarkable.  No mass.  Kidneys and ureters:  Again seen are changes of partial nephrectomy  left kidney.  Surgical clips of the right kidney are stable from the  previous exam. This may be due to changes of prior partial nephrectomy  as well.  There are no ureteral stones identified.  There are multiple  nonobstructing left renal collecting system stones noted in the lower  pole region.  On coronal and sagittal sequences, there is a 1.7 x 1.8 x  1.4 cm somewhat exophytic cortical irregularity involving the posterior  inferior aspect of the left kidney with heterogeneous enhancement  pattern that appears to be in a  separate location from the nephrectomy  margins and could represent small neoplasm versus altered perfusion  dynamics from patient's previous surgery. Additionally, this does not  appear to be present on 02/09/2009 contrast-enhanced exam raising  suspicion for primary renal neoplasm.  Stomach and bowel:  Mild constipation is noted.  No obstruction.  No  mucosal thickening.    PELVIS:  Appendix:  Appendix not visualized corresponding to history of  probable appendectomy given surgical sutures in this location.  Bladder:  Unremarkable.  No mass.  No stones.  Reproductive:  Hysterectomy.    ABDOMEN and PELVIS:  Intraperitoneal space:  Unremarkable.  No free air.  No significant  fluid collection.  Bones/joints:  Degenerative changes lower lumbar spine with mild  stenosis stable from the prior exam. No acute bony findings identified.  No dislocation.  Soft tissues:  Unremarkable.  Vasculature:  Unremarkable.  No abdominal aortic aneurysm.  Lymph nodes:  Unremarkable.  No enlarged lymph nodes.    Impression  1.  1.7 x 1.8 x 1.4 cm exophytic cortical irregularity of the left  kidney posterior inferior aspect that has developed since the 2009  contrast-enhanced exam with enhancement features raising suspicion for  primary neoplasm.  2.  Bilateral partial nephrectomy changes given presence of surgical  clips.  3.  No hydronephrosis or obstructing stones.  4.  Other incidental/nonacute findings as above which appear stable from  the previous exam.    This report was finalized on 11/10/2020 10:59 AM by Dr. Randoplh Ramirez MD.       CT Stone Protocol: Results for orders placed during the hospital encounter of 01/23/19    CT Abdomen Pelvis Stone Protocol    Narrative  CT ABDOMEN PELVIS STONE PROTOCOL    CLINICAL INDICATION: Stones in kidney and possibly in ureter;  N20.0-Calculus of kidney; N20.1-Calculus of ureter.    COMPARISON: 05/17/2018    TECHNIQUE: Axial images were acquired from the lung bases through the  pubic  symphysis without any IV or oral contrast.  Reformatted images were created in both the coronal and sagittal planes.    Radiation dose reduction techniques were utilized per ALARA protocol.  Automated exposure control was initiated through either or tuul or  DoseRight software packages by  protocol.      FINDINGS:  The lung bases are clear. There are no pleural effusions.    The liver is homogeneous. There is no evidence of focal hepatic mass.    The spleen is homogeneous.    Again mildly indistinct appearance of the pancreatic head but stable.    There is no adrenal enlargement.    Bilateral renal calcifications are present and similar to the previous  exam. No hydronephrosis..    Otherwise I do not see any free fluid or walled off fluid collections.    There is no bowel wall thickening or mesenteric stranding.    There is no evidence of mesenteric or retroperitoneal adenopathy.    Arthritic change in the spine.    Impression  Overall no significant interval change since the prior  study.          This report was finalized on 1/23/2019 2:08 PM by Dr. Easton Black MD.       KUB: Results for orders placed during the hospital encounter of 10/29/20    XR Abdomen KUB    Narrative  EXAM:  XR Abdomen, 1 View    EXAM DATE:  10/29/2020 10:30 AM    CLINICAL HISTORY:  left flank pain; R10.9-Unspecified abdominal pain; Z87.442-Personal  history of urinary calculi    TECHNIQUE:  Frontal supine view of the abdomen/pelvis.    COMPARISON:  11/15/2019    FINDINGS:  Intraperitoneal space:  Calcifications are noted in the right upper  quadrant are fairly small.  Gastrointestinal tract:  Unremarkable.  No dilation.  Organs:  Calcifications are noted in region of left kidney measuring  about 2.4 cm.  Bones/joints:  Unremarkable.  Vasculature:  Calcifications noted in the pelvis are probably  vascular.  Other findings:  Left upper quadrant calcification measuring 6.5 mm is  noted and could be in course of the  ureter.    Impression  1.  Calcifications are noted in region of left kidney measuring about  2.4 cm.  2.  Left upper quadrant calcification measuring 6.5 mm is noted and  could be in course of the ureter.  3.  Calcifications are noted in the right upper quadrant are fairly  small.    This report was finalized on 10/29/2020 10:38 AM by Dr. Omar Elmore MD.       Labs (past 3 months):      Hospital Outpatient Visit on 09/19/2022   Component Date Value Ref Range Status   • Target HR (85%) 09/19/2022 133  bpm Final   • Max. Pred. HR (100%) 09/19/2022 156  bpm Final   • BH CV STRESS PROTOCOL 1 09/19/2022 Jose   Final   • Stage 1 09/19/2022 1   Final   • HR Stage 1 09/19/2022 102   Final   • BP Stage 1 09/19/2022 152/86   Final   • O2 Stage 1 09/19/2022 97   Final   • Duration Min Stage 1 09/19/2022 3   Final   • Duration Sec Stage 1 09/19/2022 0   Final   • Grade Stage 1 09/19/2022 10   Final   • Speed Stage 1 09/19/2022 1.7   Final   • BH CV STRESS METS STAGE 1 09/19/2022 5   Final   • Stage 2 09/19/2022 2   Final   • HR Stage 2 09/19/2022 125   Final   • BP Stage 2 09/19/2022 166/82   Final   • O2 Stage 2 09/19/2022 97   Final   • Duration Min Stage 2 09/19/2022 3   Final   • Duration Sec Stage 2 09/19/2022 0   Final   • Grade Stage 2 09/19/2022 12   Final   • Speed Stage 2 09/19/2022 2.5   Final   • BH CV STRESS METS STAGE 2 09/19/2022 7.5   Final   • Stage 3 09/19/2022 3   Final   • HR Stage 3 09/19/2022 136   Final   • O2 Stage 3 09/19/2022 99   Final   • Duration Min Stage 3 09/19/2022 1   Final   • Duration Sec Stage 3 09/19/2022 21   Final   • Grade Stage 3 09/19/2022 14   Final   • Speed Stage 3 09/19/2022 3.4   Final   • BH CV STRESS METS STAGE 3 09/19/2022 10.0   Final   • Baseline HR 09/19/2022 64  bpm Final   • Baseline BP 09/19/2022 144/92  mmHg Final   • O2 sat rest 09/19/2022 98  % Final   • Peak HR 09/19/2022 137  bpm Final   • Percent Max Pred HR 09/19/2022 87.82  % Final   • Percent Target HR  09/19/2022 103  % Final   • Peak BP 09/19/2022 190/66  mmHg Final   • O2 sat peak 09/19/2022 97  % Final   • Recovery HR 09/19/2022 81  bpm Final   • Recovery BP 09/19/2022 164/82  mmHg Final   • Recovery O2 09/19/2022 98  % Final   • Exercise duration (min) 09/19/2022 7  min Final   • Exercise duration (sec) 09/19/2022 21  sec Final   • Estimated workload 09/19/2022 7.6  METS Final   • BH CV REST NUCLEAR ISOTOPE DOSE 09/19/2022 9.9  mCi Final   • BH CV STRESS NUCLEAR ISOTOPE DOSE 09/19/2022 31.3  mCi Final   • Nuc Stress EF 09/19/2022 83  % Final        Procedure:       Assessment/Plan:   Renal neoplasm-status post cryosurgical ablation  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  The mainstay of treatment has been tight cholinergics which basically cause the bladder to have decreased contractility.  We have discussed the side effects of these treatments including dry mouth, double vision, and increasing constipation.  She prefers trospium chloride          This document has been electronically signed by CHELSIE BARRERA MD December 1, 2022 10:48 EST    Dictated Utilizing Dragon Dictation: Part of this note may be an electronic transcription/translation of spoken language to printed text using the Dragon Dictation System.

## 2023-01-23 ENCOUNTER — HOSPITAL ENCOUNTER (OUTPATIENT)
Dept: ULTRASOUND IMAGING | Facility: HOSPITAL | Age: 63
Discharge: HOME | End: 2023-01-23
Admitting: NURSE PRACTITIONER
Payer: COMMERCIAL

## 2023-01-23 DIAGNOSIS — R20.2 PARESTHESIA: ICD-10-CM

## 2023-01-23 PROCEDURE — 93880 EXTRACRANIAL BILAT STUDY: CPT | Performed by: RADIOLOGY

## 2023-01-23 PROCEDURE — 93880 EXTRACRANIAL BILAT STUDY: CPT

## 2023-07-31 ENCOUNTER — TELEPHONE (OUTPATIENT)
Dept: UROLOGY | Facility: CLINIC | Age: 63
End: 2023-07-31
Payer: COMMERCIAL

## 2023-08-01 ENCOUNTER — TELEPHONE (OUTPATIENT)
Dept: UROLOGY | Facility: CLINIC | Age: 63
End: 2023-08-01
Payer: COMMERCIAL

## 2023-08-02 ENCOUNTER — TELEPHONE (OUTPATIENT)
Dept: UROLOGY | Facility: CLINIC | Age: 63
End: 2023-08-02
Payer: COMMERCIAL

## 2023-08-03 ENCOUNTER — TELEPHONE (OUTPATIENT)
Dept: GASTROENTEROLOGY | Facility: CLINIC | Age: 63
End: 2023-08-03
Payer: COMMERCIAL

## 2023-08-03 DIAGNOSIS — R35.0 FREQUENCY OF URINATION: ICD-10-CM

## 2023-08-03 DIAGNOSIS — N39.41 URGENCY INCONTINENCE: ICD-10-CM

## 2023-08-03 DIAGNOSIS — N32.81 DETRUSOR INSTABILITY: Primary | ICD-10-CM

## 2023-08-04 ENCOUNTER — TELEPHONE (OUTPATIENT)
Dept: UROLOGY | Facility: CLINIC | Age: 63
End: 2023-08-04
Payer: COMMERCIAL

## 2023-10-05 NOTE — TELEPHONE ENCOUNTER
PA sent to insurance. Awaiting response.   Take medication as directed.    Follow-up with your primary care provider in 3-5 days.  If you do not have a primary care provider call [phone] for help in finding one, or you may follow up with [facility] at [phone].    Return to ED for any new or worsening symptoms

## 2023-10-23 ENCOUNTER — OFFICE VISIT (OUTPATIENT)
Dept: UROLOGY | Facility: CLINIC | Age: 63
End: 2023-10-23
Payer: MEDICARE

## 2023-10-23 ENCOUNTER — TELEPHONE (OUTPATIENT)
Dept: UROLOGY | Facility: CLINIC | Age: 63
End: 2023-10-23

## 2023-10-23 ENCOUNTER — HOSPITAL ENCOUNTER (OUTPATIENT)
Dept: GENERAL RADIOLOGY | Facility: HOSPITAL | Age: 63
Discharge: HOME | End: 2023-10-23
Admitting: NURSE PRACTITIONER
Payer: MEDICARE

## 2023-10-23 VITALS
HEIGHT: 67 IN | WEIGHT: 200 LBS | DIASTOLIC BLOOD PRESSURE: 75 MMHG | BODY MASS INDEX: 31.39 KG/M2 | HEART RATE: 68 BPM | SYSTOLIC BLOOD PRESSURE: 146 MMHG

## 2023-10-23 DIAGNOSIS — R10.9 FLANK PAIN: ICD-10-CM

## 2023-10-23 DIAGNOSIS — N20.1 LEFT URETERAL STONE: ICD-10-CM

## 2023-10-23 DIAGNOSIS — Z87.442 HISTORY OF KIDNEY STONES: ICD-10-CM

## 2023-10-23 DIAGNOSIS — R30.0 DYSURIA: ICD-10-CM

## 2023-10-23 DIAGNOSIS — R10.9 ACUTE LEFT FLANK PAIN: ICD-10-CM

## 2023-10-23 DIAGNOSIS — R35.0 FREQUENCY OF URINATION: ICD-10-CM

## 2023-10-23 DIAGNOSIS — R35.0 FREQUENCY OF URINATION: Primary | ICD-10-CM

## 2023-10-23 DIAGNOSIS — N20.0 STAGHORN RENAL CALCULUS: ICD-10-CM

## 2023-10-23 LAB
BILIRUB BLD-MCNC: NEGATIVE MG/DL
CLARITY, POC: CLEAR
COLOR UR: YELLOW
EXPIRATION DATE: ABNORMAL
GLUCOSE UR STRIP-MCNC: NEGATIVE MG/DL
KETONES UR QL: NEGATIVE
LEUKOCYTE EST, POC: NEGATIVE
Lab: ABNORMAL
NITRITE UR-MCNC: NEGATIVE MG/ML
PH UR: 6 [PH] (ref 5–8)
PROT UR STRIP-MCNC: NEGATIVE MG/DL
RBC # UR STRIP: ABNORMAL /UL
SP GR UR: 1.01 (ref 1–1.03)
UROBILINOGEN UR QL: NORMAL

## 2023-10-23 PROCEDURE — 87086 URINE CULTURE/COLONY COUNT: CPT | Performed by: NURSE PRACTITIONER

## 2023-10-23 PROCEDURE — 74018 RADEX ABDOMEN 1 VIEW: CPT

## 2023-10-23 PROCEDURE — 74018 RADEX ABDOMEN 1 VIEW: CPT | Performed by: RADIOLOGY

## 2023-10-23 RX ORDER — PENICILLIN V POTASSIUM 500 MG/1
500 TABLET ORAL
COMMUNITY
Start: 2023-10-10

## 2023-10-23 RX ORDER — KETOROLAC TROMETHAMINE 30 MG/ML
30 INJECTION, SOLUTION INTRAMUSCULAR; INTRAVENOUS ONCE
Status: COMPLETED | OUTPATIENT
Start: 2023-10-23 | End: 2023-10-23

## 2023-10-23 RX ORDER — ONDANSETRON 4 MG/1
4 TABLET, FILM COATED ORAL EVERY 12 HOURS PRN
Qty: 30 TABLET | Refills: 0 | Status: SHIPPED | OUTPATIENT
Start: 2023-10-23

## 2023-10-23 RX ORDER — TAMSULOSIN HYDROCHLORIDE 0.4 MG/1
1 CAPSULE ORAL NIGHTLY
Qty: 30 CAPSULE | Refills: 1 | Status: SHIPPED | OUTPATIENT
Start: 2023-10-23

## 2023-10-23 RX ORDER — LOSARTAN POTASSIUM 25 MG/1
1 TABLET ORAL DAILY
COMMUNITY
Start: 2023-09-11

## 2023-10-23 RX ORDER — LINACLOTIDE 72 UG/1
72 CAPSULE, GELATIN COATED ORAL EVERY MORNING
COMMUNITY
Start: 2023-07-24

## 2023-10-23 RX ORDER — KETOROLAC TROMETHAMINE 10 MG/1
10 TABLET, FILM COATED ORAL EVERY 6 HOURS PRN
Qty: 20 TABLET | Refills: 0 | Status: SHIPPED | OUTPATIENT
Start: 2023-10-23

## 2023-10-23 RX ADMIN — KETOROLAC TROMETHAMINE 30 MG: 30 INJECTION, SOLUTION INTRAMUSCULAR; INTRAVENOUS at 11:42

## 2023-10-23 NOTE — PROGRESS NOTES
Chief Complaint  Nephrolithiasis and LEFT URETERAL STONE/STAGHORN CALCULUS/FLANK PAIN (FOLLOW UP ABDOMINAL/FLANK/BACK PAIN)    Subjective          Sona Rosa presents to [facility] for LEFT URETERAL/STAGHORN RENAL STONES  History of Present Illness    Ms. Sona Rosa is a pleasant 63-year-old female with a significant history of left staghorn renal stones, left partial nephrectomy secondary to renal neoplasm, who returns to clinic today for evaluation. This is a 6-month follow-up with prior significant history of kidney stones, acute cystitis with hematuria.     Last evaluated in clinic by Dr. Knapp, patient was not desirous of any surgical intervention. She did have overactive bladder/detrusor instability which was complicated by bouts of urine frequency, urgency, and dysuria. She was started on trospium and reported remarkable improvement in her symptoms. She has done relatively well, has been lost to follow-up until recently.     Nevertheless, she presents to clinic today with complaints of left flank pain radiating to left lower quadrant abdomen causing significant pelvic pressure and suprapubic discomfort.  She describes her pain as colicky in nature, although it has been intermittent recently it has been constant with increased difficulty finding any comfortable sitting or lying position.  She reports nausea without vomiting, chills without any fevers.  Urinalysis is completely negative for leukocyte esterase, negative for nitrite. It does show 1+ microscopic hematuria. Postvoid residual is 0 mL.     I have reviewed the KUB completed this morning which is significant for left upper quadrant mid ureteral region stone again noted the stone is about 6 mm with a larger >2CM multiple staghorn  calculi collections within the region of the left kidney. Plan will be to discuss surgical intervention if patient is ready.    The patient states she has had kidney  "stones for a long time. Dr. De La Cruz in [place] would not attempt to remove them-due to staghorn calculi..  She  has had 2 or 3 kidney stones in the past. She is having pain in her left flank radiating to the lower back and left abdomen. She is having urinary frequency, urgency and burning. She has to urinate every 20 to 30 minutes. She states she is taking Myrbetriq 50 mg at night. When she lays down, the pain eases up, but then she is up and down all night. She feels pressure when she urinates. She is taking Flomax. She is taking Zofran for nausea. She is not able to work. She is taking penicillin for a UTI. She has had Toradol in the past.                                   IMPRESSION KUB 10/23/23    Left upper quadrant mid ureteral region calculus again noted measuring about 6 mm with larger calculi and calculi collections within region of left kidney.    Active Ambulatory Problems     Diagnosis Date Noted    Hypertension 07/08/2016    Hyperlipidemia 07/08/2016    B12 deficiency 07/08/2016    GERD (gastroesophageal reflux disease) 07/08/2016    Fatigue 07/08/2016    Anxiety 07/28/2016    Palpitation 07/28/2016    Urgency incontinence 11/30/2016    Skin rash 05/27/2018    Vitamin D deficiency 02/19/2019    Leg cramps 02/25/2020    Staghorn renal calculus 11/12/2020    Detrusor instability 08/06/2022    Left ureteral stone 10/23/2023    Acute left flank pain 10/24/2023     Resolved Ambulatory Problems     Diagnosis Date Noted    No Resolved Ambulatory Problems     No Additional Past Medical History      Objective   Vital Signs:   /75 (BP Location: Right arm, Patient Position: Sitting)   Pulse 68   Ht 170.2 cm (67.01\")   Wt 90.7 kg (200 lb)   BMI 31.32 kg/m²       ROS:   Review of Systems   Constitutional:  Positive for activity change, chills, fatigue and unexpected weight gain. Negative for appetite change, diaphoresis, fever and unexpected weight loss.   HENT: Negative.  Negative for congestion, ear " discharge, ear pain, nosebleeds, rhinorrhea, sinus pressure and sore throat.    Eyes: Negative.  Negative for blurred vision, double vision, photophobia, pain, redness and visual disturbance.   Respiratory:  Positive for shortness of breath. Negative for apnea, cough, chest tightness, wheezing and stridor.    Cardiovascular:  Negative for chest pain, palpitations and leg swelling.   Gastrointestinal:  Positive for abdominal distention, abdominal pain, constipation and nausea. Negative for diarrhea and vomiting.   Endocrine: Negative.  Negative for polydipsia, polyphagia and polyuria.   Genitourinary:  Positive for dysuria, frequency, pelvic pain, pelvic pressure, urgency and urinary incontinence. Negative for decreased urine volume, difficulty urinating, dyspareunia, flank pain, genital sores, hematuria and vaginal discharge.   Musculoskeletal:  Positive for back pain and myalgias. Negative for arthralgias and joint swelling.   Skin:  Positive for dry skin and pallor. Negative for rash and wound.   Allergic/Immunologic: Negative.    Neurological:  Negative for dizziness, tremors, syncope, weakness, light-headedness, headache, memory problem and confusion.   Hematological: Negative.    Psychiatric/Behavioral:  Positive for sleep disturbance and stress. Negative for behavioral problems and decreased concentration.         Physical Exam  Constitutional:       General: She is in acute distress.      Appearance: She is well-developed. She is ill-appearing.   HENT:      Head: Normocephalic and atraumatic.   Eyes:      Pupils: Pupils are equal, round, and reactive to light.   Neck:      Thyroid: No thyromegaly.      Trachea: No tracheal deviation.   Cardiovascular:      Rate and Rhythm: Normal rate and regular rhythm.      Heart sounds: No murmur heard.  Pulmonary:      Effort: Pulmonary effort is normal. No respiratory distress.      Breath sounds: Normal breath sounds. No stridor. No wheezing.   Abdominal:      General:  Bowel sounds are normal. There is distension.      Palpations: Abdomen is soft.      Tenderness: There is abdominal tenderness.   Genitourinary:     Labia:         Right: No tenderness.         Left: No tenderness.       Vagina: Normal. No vaginal discharge.   Musculoskeletal:         General: Tenderness present. No deformity. Normal range of motion.      Cervical back: Normal range of motion.   Skin:     General: Skin is warm and dry.      Capillary Refill: Capillary refill takes less than 2 seconds.      Coloration: Skin is pale.      Findings: No erythema or rash.   Neurological:      Mental Status: She is alert and oriented to person, place, and time.      Cranial Nerves: No cranial nerve deficit.      Sensory: No sensory deficit.      Motor: Weakness present.      Coordination: Coordination normal.   Psychiatric:         Behavior: Behavior normal.         Thought Content: Thought content normal.         Judgment: Judgment normal.        Result Review :     UA          10/23/2023    10:47   Urinalysis   Ketones, UA Negative    Leukocytes, UA Negative      Urine Culture          10/23/2023    10:49   Urine Culture   Urine Culture No growth             Assessment and Plan    Problem List Items Addressed This Visit          Gastrointestinal Abdominal     Acute left flank pain    Relevant Medications    ketorolac (TORADOL) 10 MG tablet    tamsulosin (Flomax) 0.4 MG capsule 24 hr capsule    ondansetron (Zofran) 4 MG tablet       Genitourinary and Reproductive     Staghorn renal calculus    Overview     Added automatically from request for surgery [id number]         Relevant Medications    ketorolac (TORADOL) 10 MG tablet    tamsulosin (Flomax) 0.4 MG capsule 24 hr capsule    ondansetron (Zofran) 4 MG tablet    Left ureteral stone    Relevant Medications    ketorolac (TORADOL) 10 MG tablet    tamsulosin (Flomax) 0.4 MG capsule 24 hr capsule    ondansetron (Zofran) 4 MG tablet     Other Visit Diagnoses       Frequency of  urination    -  Primary    Relevant Orders    XR abdomen kub (Completed)    Dysuria        Relevant Medications    ketorolac (TORADOL) 10 MG tablet    tamsulosin (Flomax) 0.4 MG capsule 24 hr capsule    ondansetron (Zofran) 4 MG tablet    Other Relevant Orders    POC Urinalysis Dipstick, Automated (Completed)    Urine Culture - Urine, Urine, Clean Catch (Completed)    Flank pain        Relevant Medications    ketorolac (TORADOL) injection 30 mg (Completed)    ketorolac (TORADOL) 10 MG tablet    tamsulosin (Flomax) 0.4 MG capsule 24 hr capsule    ondansetron (Zofran) 4 MG tablet    History of kidney stones        Relevant Medications    ketorolac (TORADOL) 10 MG tablet    tamsulosin (Flomax) 0.4 MG capsule 24 hr capsule    ondansetron (Zofran) 4 MG tablet            ASSESSMENT  Left Flank Pain / Left Ureteral Calculus/MULTIPL LEFT STAGHORN CALCULI   Ms. Sona Rosa is a pleasant 63-year-old female with significant history of recurrent kidney stones who again presents to clinic today for evaluation of left greater than right flank pain, which she describes as colicky in nature, persistent, consistent with her prior kidney stone discomfort.  Patient is post left partial nephrectomy secondary to neoplasm.  Her urinalysis today is completely negative for any infection, it shows 1+ microscopic hematuria.  Her PVR is 0 mL.      The Patient has been diagnosed with a 6 mm left ureteral calculus.. SHe has some discomfort and describes her pain as colicky,AND INtolerable. We have discussed the various parameters regarding spontaneous passage including the notion that a tiny 1-4 mm stone has a high likelihood of spontaneous passage versus a larger stone of greater than 6 mm like She has  being caught up in the upper areas of the urinary tract. We also discussed the medical management of stone disease and the use of medical expulsive therapy in the form of Flomax. This is used in an off label setting. I also talked about  nonoperative management including ambulation and increasing fluids to atleast 2-3L,  and hot tubs as being an effective adjuncts in the treatment of a ureteral stone.     We discussed the absolute relative indicators for intervention including the presence of sepsis, and pain we cannot control as the primary need for urgent intervention.  We discussed placement of a stent if indicated and the management of the stent as well.       PLAN.      Toradol 30 mg IM x1 dose given in clinic today for her severe pain and discomfort  We discussed scheduling for left uteroscopy, laser lithotripsy with Dr. Knapp but patient is adamant.  States she will call when she is ready.    Patient has been given tamsulosin 0.4 mg to take daily, just in case and Nausea   medication  for medical expulsive therapy.      SHe is to use Toradol 10 as needed for breakthrough pain.    We discussed treatment options for HER BACK/FLANK pain with patient encouraged to continue conservative therapy alternating NSAID/Tylenol as tolerated, Also including hot baths, showers, warm compresses to lower back as tolerated. Also encouraged walking, physical therapy, light exercises as tolerated    Rest is the most important treatment in the case of BACK/FLANK pain. Rest and physical therapy are enough to improve minor pain. Discussed to monitor his daily routine with prevention of flank pain by: At least Drinking 8 glasses of water per day, Limiting your alcohol consumption.  Having a healthy diet containing fruits, vegetables, and a lot of fluids, Practicing safe sex.  Also maintaining proper hygiene of your body as well as the environment.    Discussed a kidney stone prevention diet to include increasing p.o. fluid intake, to at least 1 to 2 L of water daily.  She is to avoid caffeine products such as cola, coffee, and to avoid soft or soda drinks.  She is to decrease her sodium consumption as in  Fast foods, gruber, salted nuts, canned foods, and  smoked/cured foods. She is also to decrease her oxalate consumption, as in spinach, Jose greens, and Rhubarb.  Also important is to decrease protein intake, as in red meats, peanut butter, and also avoid nuts.    FOLLOW UP in clinic as discussed, or go to -Dr. WINN    Return to clinic sooner if need be    Go to ER if any worsening pain and discomfort     Patient is agreeable  WITH plan of care.    Patient reports that she is not currently experiencing any symptoms of urinary incontinence.    BMI is >= 30 and <35. (Class 1 Obesity). The following options were offered after discussion;: weight loss educational material (shared in after visit summary), exercise counseling/recommendations, and nutrition counseling/recommendations    RADIOLOGY (CT AND/OR KUB):    CT Abdomen and Pelvis: Results for orders placed during the hospital encounter of 11/10/20    CT Abdomen Pelvis With & Without Contrast    Narrative  EXAM:  CT Abdomen and Pelvis Without and With Intravenous Contrast    EXAM DATE:  11/10/2020 10:12 AM    CLINICAL HISTORY:  Flank pain, kidney stone suspected; R10.9-Unspecified abdominal pain;  Z87.442-Personal history of urinary calculi    TECHNIQUE:  Axial computed tomography images of the abdomen and pelvis without and  with intravenous contrast.  Sagittal and coronal reformatted images were  created and reviewed.  This CT exam was performed using one or more of  the following dose reduction techniques:  automated exposure control,  adjustment of the mA and/or kV according to patient size, and/or use of  iterative reconstruction technique.    COMPARISON:  01/23/2019, 02/09/2019    FINDINGS:  Lung bases:  Unremarkable.  No mass.  No consolidation.    ABDOMEN:  Liver:  Unremarkable.  No mass.  Gallbladder and bile ducts:  Unremarkable.  No calcified stones.  No  ductal dilation.  Pancreas:  Unremarkable.  No mass.  No ductal dilation.  Spleen:  Unremarkable.  No splenomegaly.  Adrenals:  Unremarkable.  No  mass.  Kidneys and ureters:  Again seen are changes of partial nephrectomy  left kidney.  Surgical clips of the right kidney are stable from the  previous exam. This may be due to changes of prior partial nephrectomy  as well.  There are no ureteral stones identified.  There are multiple  nonobstructing left renal collecting system stones noted in the lower  pole region.  On coronal and sagittal sequences, there is a 1.7 x 1.8 x  1.4 cm somewhat exophytic cortical irregularity involving the posterior  inferior aspect of the left kidney with heterogeneous enhancement  pattern that appears to be in a separate location from the nephrectomy  margins and could represent small neoplasm versus altered perfusion  dynamics from patient's previous surgery. Additionally, this does not  appear to be present on 02/09/2009 contrast-enhanced exam raising  suspicion for primary renal neoplasm.  Stomach and bowel:  Mild constipation is noted.  No obstruction.  No  mucosal thickening.    PELVIS:  Appendix:  Appendix not visualized corresponding to history of  probable appendectomy given surgical sutures in this location.  Bladder:  Unremarkable.  No mass.  No stones.  Reproductive:  Hysterectomy.    ABDOMEN and PELVIS:  Intraperitoneal space:  Unremarkable.  No free air.  No significant  fluid collection.  Bones/joints:  Degenerative changes lower lumbar spine with mild  stenosis stable from the prior exam. No acute bony findings identified.  No dislocation.  Soft tissues:  Unremarkable.  Vasculature:  Unremarkable.  No abdominal aortic aneurysm.  Lymph nodes:  Unremarkable.  No enlarged lymph nodes.    Impression  1.  1.7 x 1.8 x 1.4 cm exophytic cortical irregularity of the left  kidney posterior inferior aspect that has developed since the 2009  contrast-enhanced exam with enhancement features raising suspicion for  primary neoplasm.  2.  Bilateral partial nephrectomy changes given presence of surgical  clips.  3.  No  hydronephrosis or obstructing stones.  4.  Other incidental/nonacute findings as above which appear stable from  the previous exam.    This report was finalized on 11/10/2020 10:59 AM by Dr. Randolph Ramirez MD.     CT Stone Protocol: Results for orders placed during the hospital encounter of 01/23/19    CT Abdomen Pelvis Stone Protocol    Narrative  CT ABDOMEN PELVIS STONE PROTOCOL    CLINICAL INDICATION: Stones in kidney and possibly in ureter;  N20.0-Calculus of kidney; N20.1-Calculus of ureter.    COMPARISON: 05/17/2018    TECHNIQUE: Axial images were acquired from the lung bases through the  pubic symphysis without any IV or oral contrast.  Reformatted images were created in both the coronal and sagittal planes.    Radiation dose reduction techniques were utilized per ALARA protocol.  Automated exposure control was initiated through either or [id number] or  DoseRight software packages by  protocol.      FINDINGS:  The lung bases are clear. There are no pleural effusions.    The liver is homogeneous. There is no evidence of focal hepatic mass.    The spleen is homogeneous.    Again mildly indistinct appearance of the pancreatic head but stable.    There is no adrenal enlargement.    Bilateral renal calcifications are present and similar to the previous  exam. No hydronephrosis..    Otherwise I do not see any free fluid or walled off fluid collections.    There is no bowel wall thickening or mesenteric stranding.    There is no evidence of mesenteric or retroperitoneal adenopathy.    Arthritic change in the spine.    Impression  Overall no significant interval change since the prior  study.      This report was finalized on 1/23/2019 2:08 PM by Dr. Easton Black MD.     KUB: Results for orders placed during the hospital encounter of 10/29/20    XR Abdomen KUB    Narrative  EXAM:  XR Abdomen, 1 View    EXAM DATE:  10/29/2020 10:30 AM    CLINICAL HISTORY:  left flank pain; R10.9-Unspecified abdominal pain;  Z87.442-Personal  history of urinary calculi    TECHNIQUE:  Frontal supine view of the abdomen/pelvis.    COMPARISON:  11/15/2019    FINDINGS:  Intraperitoneal space:  Calcifications are noted in the right upper  quadrant are fairly small.  Gastrointestinal tract:  Unremarkable.  No dilation.  Organs:  Calcifications are noted in region of left kidney measuring  about 2.4 cm.  Bones/joints:  Unremarkable.  Vasculature:  Calcifications noted in the pelvis are probably  vascular.  Other findings:  Left upper quadrant calcification measuring 6.5 mm is  noted and could be in course of the ureter.    Impression  1.  Calcifications are noted in region of left kidney measuring about  2.4 cm.  2.  Left upper quadrant calcification measuring 6.5 mm is noted and  could be in course of the ureter.  3.  Calcifications are noted in the right upper quadrant are fairly  small.    This report was finalized on 10/29/2020 10:38 AM by Dr. Omar Elmore MD.       LABS (3 MONTHS):    Office Visit on 10/23/2023   Component Date Value Ref Range Status    Color 10/23/2023 Yellow  Yellow, Straw, Dark Yellow, Gris Final    Clarity, UA 10/23/2023 Clear  Clear Final    Specific Gravity  10/23/2023 1.015  1.005 - 1.030 Final    pH, Urine 10/23/2023 6.0  5.0 - 8.0 Final    Leukocytes 10/23/2023 Negative  Negative Final    Nitrite, UA 10/23/2023 Negative  Negative Final    Protein, POC 10/23/2023 Negative  Negative mg/dL Final    Glucose, UA 10/23/2023 Negative  Negative mg/dL Final    Ketones, UA 10/23/2023 Negative  Negative Final    Urobilinogen, UA 10/23/2023 Normal  Normal, 0.2 E.U./dL Final    Bilirubin 10/23/2023 Negative  Negative Final    Blood, UA 10/23/2023 1+ (A)  Negative Final    Lot Number 10/23/2023 [id number]   Final    Expiration Date 10/23/2023 102,024   Final    Urine Culture 10/23/2023 No growth   Final      Assessment:  1. Kidney stones.  2. Acute cystitis with hematuria.    Plan:  - The patient will receive a Toradol  injection today.  - She will start Flomax.  - She will call when ready for surgery.    Smoking Cessation Counseling:  Never a smoker.  Patient does not currently use any tobacco products.       Follow Up   Return in about 1 month (around 11/23/2023) for Next scheduled follow up, With Dr. Knapp IN OR [facility]/URETEROSCOPY FOR  LEFT URETERAL STONE.    Patient was given instructions and counseling regarding her condition or for health maintenance advice. Please see specific information pulled into the AVS if appropriate.          This document has been electronically signed by Griselda Cheng-Akwa, APRN   October 24, 2023 23:32 EDT      Dictated Utilizing Dragon Dictation: Part of this note may be an electronic transcription/translation of spoken language to printed text using the Dragon Dictation System.     Transcribed from ambient dictation for Griselda Cheng-Akwa, APRN by Tawana Noe.  10/23/23   12:13 EDT    Patient or patient representative verbalized consent to the visit recording.  I have personally performed the services described in this document as transcribed by the above individual, and it is both accurate and complete.

## 2023-10-23 NOTE — TELEPHONE ENCOUNTER
Caller: FAY    Relationship: Provider    Best call back number: [phone]    What form or medical record are you requesting: XR ABDOMEN KUB REPORT    Who is requesting this form or medical record from you: [facility]ER[facility]    How would you like to receive the form or medical records (pick-up, mail, fax):     FAX# [phone]    Timeframe paperwork needed: 1ST AVAILABLE    Additional notes: NA

## 2023-10-24 ENCOUNTER — TRANSCRIBE ORDERS (OUTPATIENT)
Dept: ADMINISTRATIVE | Facility: HOSPITAL | Age: 63
End: 2023-10-24
Payer: MEDICARE

## 2023-10-24 ENCOUNTER — TELEPHONE (OUTPATIENT)
Dept: UROLOGY | Facility: CLINIC | Age: 63
End: 2023-10-24
Payer: MEDICARE

## 2023-10-24 DIAGNOSIS — N28.89 RENAL MASS: Primary | ICD-10-CM

## 2023-10-24 PROBLEM — R10.9 ACUTE LEFT FLANK PAIN: Status: ACTIVE | Noted: 2023-10-24

## 2023-10-24 LAB — BACTERIA SPEC AEROBE CULT: NO GROWTH

## 2023-10-24 NOTE — TELEPHONE ENCOUNTER
I called the pt letting her know that her urine culture was negative for any bacterial infection. The pt verbalized understanding.                   ----- Message from Griselda Cheng-Akwa, APRN sent at 10/24/2023  2:02 PM EDT -----  Please kindly let patient know her urine culture results are negative for any bacterial growth at this time.  However she may drop off another urine sample if she feels symptomatic, if not follow-up in clinic as discussed.    Urine Culture   No growth        Thank you

## 2024-02-01 ENCOUNTER — TRANSCRIBE ORDERS (OUTPATIENT)
Dept: ADMINISTRATIVE | Facility: HOSPITAL | Age: 64
End: 2024-02-01
Payer: MEDICARE

## 2024-02-01 ENCOUNTER — HOSPITAL ENCOUNTER (OUTPATIENT)
Dept: GENERAL RADIOLOGY | Facility: HOSPITAL | Age: 64
Discharge: HOME | End: 2024-02-01
Admitting: PHYSICIAN ASSISTANT
Payer: MEDICARE

## 2024-02-01 DIAGNOSIS — R53.1 WEAK: ICD-10-CM

## 2024-02-01 DIAGNOSIS — R53.1 WEAK: Primary | ICD-10-CM

## 2024-02-01 PROCEDURE — 72110 X-RAY EXAM L-2 SPINE 4/>VWS: CPT | Performed by: RADIOLOGY

## 2024-02-01 PROCEDURE — 72110 X-RAY EXAM L-2 SPINE 4/>VWS: CPT

## 2024-02-23 ENCOUNTER — TRANSCRIBE ORDERS (OUTPATIENT)
Dept: ADMINISTRATIVE | Facility: HOSPITAL | Age: 64
End: 2024-02-23
Payer: MEDICARE

## 2024-02-23 DIAGNOSIS — E04.1 NONTOXIC SINGLE THYROID NODULE: Primary | ICD-10-CM

## 2024-02-27 ENCOUNTER — TRANSCRIBE ORDERS (OUTPATIENT)
Dept: ADMINISTRATIVE | Facility: HOSPITAL | Age: 64
End: 2024-02-27
Payer: MEDICARE

## 2024-02-27 DIAGNOSIS — M54.40 LOW BACK PAIN WITH SCIATICA, SCIATICA LATERALITY UNSPECIFIED, UNSPECIFIED BACK PAIN LATERALITY, UNSPECIFIED CHRONICITY: Primary | ICD-10-CM

## 2024-03-08 ENCOUNTER — HOSPITAL ENCOUNTER (OUTPATIENT)
Dept: MRI IMAGING | Facility: HOSPITAL | Age: 64
Discharge: HOME | End: 2024-03-08
Payer: MEDICARE

## 2024-03-08 ENCOUNTER — HOSPITAL ENCOUNTER (OUTPATIENT)
Dept: ULTRASOUND IMAGING | Facility: HOSPITAL | Age: 64
Discharge: HOME | End: 2024-03-08
Payer: MEDICARE

## 2024-03-08 DIAGNOSIS — M54.40 LOW BACK PAIN WITH SCIATICA, SCIATICA LATERALITY UNSPECIFIED, UNSPECIFIED BACK PAIN LATERALITY, UNSPECIFIED CHRONICITY: ICD-10-CM

## 2024-03-08 DIAGNOSIS — E04.1 NONTOXIC SINGLE THYROID NODULE: ICD-10-CM

## 2024-03-08 PROCEDURE — 72148 MRI LUMBAR SPINE W/O DYE: CPT

## 2024-03-08 PROCEDURE — 76536 US EXAM OF HEAD AND NECK: CPT

## 2024-03-22 ENCOUNTER — OFFICE VISIT (OUTPATIENT)
Dept: ENDOCRINOLOGY | Facility: CLINIC | Age: 64
End: 2024-03-22
Payer: MEDICARE

## 2024-03-22 VITALS
DIASTOLIC BLOOD PRESSURE: 77 MMHG | HEART RATE: 69 BPM | OXYGEN SATURATION: 96 % | SYSTOLIC BLOOD PRESSURE: 144 MMHG | WEIGHT: 209 LBS | HEIGHT: 67 IN | BODY MASS INDEX: 32.8 KG/M2

## 2024-03-22 DIAGNOSIS — E04.2 MULTIPLE THYROID NODULES: ICD-10-CM

## 2024-03-22 DIAGNOSIS — R63.5 WEIGHT GAIN: Primary | ICD-10-CM

## 2024-03-22 LAB — CORTIS SERPL-MCNC: 9.96 MCG/DL

## 2024-03-22 PROCEDURE — 83525 ASSAY OF INSULIN: CPT | Performed by: NURSE PRACTITIONER

## 2024-03-22 PROCEDURE — 86376 MICROSOMAL ANTIBODY EACH: CPT | Performed by: NURSE PRACTITIONER

## 2024-03-22 PROCEDURE — 82533 TOTAL CORTISOL: CPT | Performed by: NURSE PRACTITIONER

## 2024-03-22 PROCEDURE — 82024 ASSAY OF ACTH: CPT | Performed by: NURSE PRACTITIONER

## 2024-03-22 PROCEDURE — 86800 THYROGLOBULIN ANTIBODY: CPT | Performed by: NURSE PRACTITIONER

## 2024-03-22 PROCEDURE — 84445 ASSAY OF TSI GLOBULIN: CPT | Performed by: NURSE PRACTITIONER

## 2024-03-22 PROCEDURE — 83520 IMMUNOASSAY QUANT NOS NONAB: CPT | Performed by: NURSE PRACTITIONER

## 2024-03-22 NOTE — PROGRESS NOTES
No chief complaint on file.       Referring Provider  No ref. provider found     HPI   Sona Rosa is a 63 y.o. female had no chief complaint listed for this encounter.   Seen as a new patient.  Thyroid Nodule.    She was seen in the ER for stroke like symptoms.  She had a CT scan and was noted to have multiple nodules. She saw her PCP after that and had an US Thyroid and noted multiple nodules.  US Thyroid: 03/2024  FINDINGS:    LEFT THYROID LOBE:  Multiple nodules noted in both lobes of thyroid  that are cystic and solid, but with the most suspicious nodule in left  lobe of thyroid measuring 1.5 cm and probably containing some internal  calcifications. Another left lobe of thyroid nodule measures 1.2 cm. A  right lobe of thyroid nodule measures 1.6 cm. The gland is very  heterogeneous.    RIGHT THYROID LOBE:  See above.    ISTHMUS:  Unremarkable as visualized.  No enlarged or calcified  nodules.    LYMPH NODES:  Unremarkable as visualized.  No lymphadenopathy.     IMPRESSION:    Multiple nodules noted in both lobes of thyroid that are cystic and  solid, but with the most suspicious nodule in left lobe of thyroid  measuring 1.5 cm and probably containing some internal calcifications.  Another left lobe of thyroid nodule measures 1.2 cm. A right lobe of  thyroid nodule measures 1.6 cm. The gland is very heterogeneous.    Birth state: [place]  Previous history of radiation to face/neck: none  Consuming foods high in iodine: none  Family history of thyroid complications: none, no hx of thyroid cancer    The following portions of the patient's history were reviewed and updated as appropriate: allergies, current medications, past family history, past medical history, past social history, past surgical history, and problem list.    Past Medical History:   Diagnosis Date    B12 deficiency     Fatigue     GERD (gastroesophageal reflux disease)     Hyperlipidemia     Hypertension      Past Surgical History:   Procedure  Laterality Date    CARDIOVASCULAR STRESS TEST  2017     SECTION      ECHO - CONVERTED  2017    KIDNEY SURGERY        Family History   Problem Relation Age of Onset    Hyperlipidemia Mother     Hypertension Mother     Heart attack Father 65    Cancer Father     Hyperlipidemia Brother     Hypertension Brother       Social History     Socioeconomic History    Marital status:    Tobacco Use    Smoking status: Never    Smokeless tobacco: Never   Vaping Use    Vaping status: Never Used   Substance and Sexual Activity    Alcohol use: No    Drug use: No    Sexual activity: Defer      No Known Allergies   Current Outpatient Medications on File Prior to Visit   Medication Sig Dispense Refill    atorvastatin (LIPITOR) 20 MG tablet TAKE 1 TABLET BY MOUTH DAILY 90 tablet 3    cholecalciferol (VITAMIN D3) 1000 UNITS tablet Take 1 tablet by mouth Daily.      cyclobenzaprine (FLEXERIL) 5 MG tablet Take 1 tablet by mouth Every 8 (Eight) Hours As Needed for Muscle Spasms. (Patient not taking: Reported on 10/23/2023) 15 tablet 0    etodolac (LODINE) 400 MG tablet Take 1 tablet by mouth 2 (Two) Times a Day. 20 tablet 0    ketorolac (TORADOL) 10 MG tablet Take 1 tablet by mouth Every 6 (Six) Hours As Needed for Moderate Pain. 20 tablet 0    Linzess 72 MCG capsule capsule Take 1 capsule by mouth Every Morning.      lisinopril (PRINIVIL,ZESTRIL) 20 MG tablet Take 1 tablet by mouth 2 (Two) Times a Day. (Patient not taking: Reported on 10/23/2023) 180 tablet 3    losartan (COZAAR) 25 MG tablet Take 1 tablet by mouth Daily.      Mirabegron ER (Myrbetriq) 50 MG tablet sustained-release 24 hour 24 hr tablet Take 50 mg by mouth Daily. 30 tablet 11    ondansetron (Zofran) 4 MG tablet Take 1 tablet by mouth Every 12 (Twelve) Hours As Needed for Nausea or Vomiting. 30 tablet 0    pantoprazole (PROTONIX) 40 MG EC tablet TAKE 1 TABLET BY MOUTH DAILY (Patient not taking: Reported on 10/23/2023) 30 tablet 0    penicillin v  "potassium (VEETID) 500 MG tablet 1 tablet.      phenazopyridine (PYRIDIUM) 100 MG tablet 100 mg. (Patient not taking: Reported on 10/23/2023)      promethazine (PHENERGAN) 25 MG tablet Take 1 tablet by mouth Every 6 (Six) Hours As Needed for Nausea or Vomiting. (Patient not taking: Reported on 10/23/2023) 12 tablet 0    sertraline (ZOLOFT) 50 MG tablet Take 1 tablet by mouth Daily. (Patient not taking: Reported on 10/23/2023) 90 tablet 3    tamsulosin (Flomax) 0.4 MG capsule 24 hr capsule Take 1 capsule by mouth Every Night. 30 capsule 1    trimethoprim (TRIMPEX) 100 MG tablet Take 1 tablet nightly (Patient not taking: Reported on 10/23/2023) 30 tablet 11     No current facility-administered medications on file prior to visit.      Review of Systems   Constitutional:  Positive for fatigue, unexpected weight gain and unexpected weight loss.   Eyes:         Cataract removal   Cardiovascular:  Positive for palpitations.   Endocrine: Positive for cold intolerance.   Skin:  Positive for dry skin.        Hair loss/thinning   Neurological:  Positive for weakness.   Psychiatric/Behavioral:  Positive for sleep disturbance.    All other systems reviewed and are negative.    /77 (BP Location: Right arm, Patient Position: Sitting, Cuff Size: Small Adult)   Pulse 69   Ht 170.2 cm (67.01\")   Wt 94.8 kg (209 lb)   SpO2 96%   BMI 32.73 kg/m²      Physical Exam  Vitals reviewed.   Constitutional:       Appearance: Normal appearance.   Eyes:      Extraocular Movements: Extraocular movements intact.   Neck:      Thyroid: Thyromegaly and thyroid tenderness present. No thyroid mass.   Cardiovascular:      Rate and Rhythm: Normal rate.   Pulmonary:      Effort: Pulmonary effort is normal.   Neurological:      General: No focal deficit present.      Mental Status: She is alert and oriented to person, place, and time.   Psychiatric:         Mood and Affect: Mood normal.         Behavior: Behavior normal.         Thought Content: " "Thought content normal.         Judgment: Judgment normal.         Labs/Imaging  CMP  Lab Results   Component Value Date    GLUCOSE 90 06/30/2021    BUN 17 02/08/2022    CREATININE 1.07 02/08/2022    EGFRIFNONA 45 06/23/2022    EGFRIFAFRI 71 01/18/2024    BCR 16 02/08/2022    K 4.0 02/08/2022    CO2 24 02/08/2022    CALCIUM 9.3 02/08/2022    ALBUMIN 4.4 02/08/2022    LABIL2 1.2 (L) 02/02/2016    AST 16 02/08/2022    ALT 16 02/08/2022        CBC w/DIFF   Lab Results   Component Value Date    WBC 6.74 02/08/2022    RBC 4.56 02/08/2022    HGB 13.4 02/08/2022    HCT 41.5 02/08/2022    MCV 91 02/08/2022    MCH 29.4 02/08/2022    MCHC 32.3 02/08/2022    RDW 12.5 02/08/2022    RDWSD 41.4 06/30/2021    MPV 9.8 02/08/2022     02/08/2022    NEUTRORELPCT 61.1 03/11/2020    LYMPHORELPCT 29.4 03/11/2020    MONORELPCT 6.5 03/11/2020    EOSRELPCT 2.3 03/11/2020    BASORELPCT 0.5 03/11/2020    AUTOIGPER 0.2 03/11/2020    NEUTROABS 5.09 03/11/2020    LYMPHSABS 2.45 03/11/2020    MONOSABS 0.54 03/11/2020    EOSABS 0.19 03/11/2020    BASOSABS 0.04 03/11/2020    AUTOIGNUM 0.02 03/11/2020    NRBC 0.0 02/08/2022       TSH  Lab Results   Component Value Date    TSH 2.360 06/30/2021    TSH 2.080 02/26/2020    TSH 2.180 08/19/2019       T4  Lab Results   Component Value Date    FREET4 1.05 08/19/2019    FREET4 0.92 02/19/2019     Lab Results   Component Value Date    J0BUUBL 7.29 06/30/2021       T3  No results found for: \"T3FREE\"  No results found for: \"V6JKVNG\"    TRAb  Lab Results   Component Value Date    TSURCPAB <1.10 03/22/2024       TPO  No results found for: \"THYROIDAB\"    No valid procedures specified.    Assessment and Plan    Diagnoses and all orders for this visit:    1. Weight gain (Primary)  Assessment & Plan:  Will obtain labs to determine if underlying condition present.  Will treat and follow as indicated.      Orders:  -     Thyroid Antibodies  -     Thyroid Stimulating Immunoglobulin  -     Thyrotropin Receptor " Antibody  -     Insulin, Total  -     Cortisol  -     ACTH    2. Multiple thyroid nodules  Assessment & Plan:  Discussed US findings with patient.  Patient has multiple nodules present.  She has 2 that have more concerning appearances that I would recommend biopsy of.  Discussed biopsy options with patient.  She would like to go to [place] to have the biopsies in our [place] office.  Will schedule her for that.  Follow-up in 6 months, or sooner if indicated.           Return in about 6 months (around 9/22/2024) for Follow-up appointment. The patient was instructed to contact the clinic with any interval questions or concerns.      This document has been electronically signed by MARCIO Warner  March 24, 2024 19:25 EDT   Endocrinology    Please note that portions of this document were completed with a voice recognition program. Efforts were made to edit the dictations, but occasionally words are mis-transcribed.

## 2024-03-23 LAB
ACTH PLAS-MCNC: 13.4 PG/ML (ref 7.2–63.3)
INSULIN SERPL-ACNC: 23.4 UIU/ML (ref 2.6–24.9)

## 2024-03-24 PROBLEM — E04.2 MULTIPLE THYROID NODULES: Status: ACTIVE | Noted: 2024-03-24

## 2024-03-24 PROBLEM — R63.5 WEIGHT GAIN: Status: ACTIVE | Noted: 2024-03-24

## 2024-03-24 LAB
TSH RECEP AB SER-ACNC: <1.1 IU/L (ref 0–1.75)
TSI SER-ACNC: <0.1 IU/L (ref 0–0.55)

## 2024-03-24 NOTE — ASSESSMENT & PLAN NOTE
Will obtain labs to determine if underlying condition present.  Will treat and follow as indicated.

## 2024-03-24 NOTE — ASSESSMENT & PLAN NOTE
Discussed US findings with patient.  Patient has multiple nodules present.  She has 2 that have more concerning appearances that I would recommend biopsy of.  Discussed biopsy options with patient.  She would like to go to [place] to have the biopsies in our [place] office.  Will schedule her for that.  Follow-up in 6 months, or sooner if indicated.

## 2024-03-25 LAB
THYROGLOB AB SERPL-ACNC: <1 IU/ML (ref 0–0.9)
THYROPEROXIDASE AB SERPL-ACNC: 13 IU/ML (ref 0–34)

## 2024-03-28 ENCOUNTER — OFFICE VISIT (OUTPATIENT)
Dept: ENDOCRINOLOGY | Facility: CLINIC | Age: 64
End: 2024-03-28
Payer: MEDICARE

## 2024-03-28 VITALS
HEART RATE: 74 BPM | DIASTOLIC BLOOD PRESSURE: 75 MMHG | OXYGEN SATURATION: 98 % | WEIGHT: 208 LBS | SYSTOLIC BLOOD PRESSURE: 130 MMHG | BODY MASS INDEX: 32.65 KG/M2 | HEIGHT: 67 IN

## 2024-03-28 DIAGNOSIS — E04.2 MULTIPLE THYROID NODULES: Primary | ICD-10-CM

## 2024-03-28 NOTE — PROGRESS NOTES
"     Office Note      Date: 2024  Patient Name: Sona Rosa  MRN: [id number]  : 1960    Chief Complaint   Patient presents with    Thyroid Problem       History of Present Illness:   Sona Rosa is a 63 y.o. female who presents for Thyroid Problem  .   Current rx: none    Pt was found to have bilateral thyorid nodules and jorje wanted me to see her for FNA  Pt was concerned that her thyroid was making her tired. I explained to her that her thyroid nodules were not making her tired since her thyroid function was normal        Subjective          Review of Systems:   Review of Systems   Constitutional:  Positive for fatigue.       The following portions of the patient's history were reviewed and updated as appropriate: allergies, current medications, past family history, past medical history, past social history, past surgical history, and problem list.    Objective     Visit Vitals  /75   Pulse 74   Ht 170.2 cm (67\")   Wt 94.3 kg (208 lb)   SpO2 98%   BMI 32.58 kg/m²           Physical Exam:  Physical Exam  Vitals reviewed.   Constitutional:       Appearance: Normal appearance.   Neck:      Comments: Irregular thyroid   Lymphadenopathy:      Cervical: Cervical adenopathy present.   Neurological:      Mental Status: She is alert.   Psychiatric:         Mood and Affect: Mood normal.         Thought Content: Thought content normal.         Judgment: Judgment normal.         Assessment / Plan      Assessment & Plan:  Problem List Items Addressed This Visit          Other    Multiple thyroid nodules - Primary    Overview     Procedure: u/s guided FNA of right thyroid nodule  Indication: right thyroid nodule  After consent was obtained and time out was called, a 27 g non- aspirating technique was used to obtain specimen from the right thyroid nodule.   Hemostasis was good. No complications. Results to follow  On the ultrasound I did not feel the left thyroid nodule warranted an FNA as is was " a spongiform tr 2 nodule          Current Assessment & Plan     I did FNA of right nodule. (See above )         Relevant Orders    US Thyroid (Completed)    [facility], [facility] ([facility]HANE,[facility],MAD,JESSICA)        Electronically signed by : Medhat Nicolas MD   03/28/2024

## 2024-04-01 LAB — REF LAB TEST METHOD: NORMAL

## 2024-04-17 ENCOUNTER — HOSPITAL ENCOUNTER (OUTPATIENT)
Dept: GENERAL RADIOLOGY | Facility: HOSPITAL | Age: 64
Discharge: HOME | End: 2024-04-17
Admitting: NURSE PRACTITIONER
Payer: MEDICARE

## 2024-04-17 ENCOUNTER — OFFICE VISIT (OUTPATIENT)
Dept: UROLOGY | Facility: CLINIC | Age: 64
End: 2024-04-17
Payer: MEDICARE

## 2024-04-17 VITALS
SYSTOLIC BLOOD PRESSURE: 160 MMHG | HEIGHT: 67 IN | BODY MASS INDEX: 32.74 KG/M2 | DIASTOLIC BLOOD PRESSURE: 89 MMHG | WEIGHT: 208.6 LBS | HEART RATE: 68 BPM

## 2024-04-17 DIAGNOSIS — N20.1 LEFT URETERAL STONE: Primary | ICD-10-CM

## 2024-04-17 DIAGNOSIS — R30.0 DYSURIA: ICD-10-CM

## 2024-04-17 DIAGNOSIS — N39.41 URGENCY INCONTINENCE: ICD-10-CM

## 2024-04-17 DIAGNOSIS — M54.50 ACUTE RIGHT-SIDED LOW BACK PAIN WITHOUT SCIATICA: ICD-10-CM

## 2024-04-17 DIAGNOSIS — R10.9 ACUTE LEFT FLANK PAIN: ICD-10-CM

## 2024-04-17 DIAGNOSIS — N20.0 LEFT NEPHROLITHIASIS: ICD-10-CM

## 2024-04-17 DIAGNOSIS — N32.81 DETRUSOR INSTABILITY: ICD-10-CM

## 2024-04-17 DIAGNOSIS — N30.01 ACUTE CYSTITIS WITH HEMATURIA: ICD-10-CM

## 2024-04-17 DIAGNOSIS — R10.9 RIGHT FLANK PAIN: ICD-10-CM

## 2024-04-17 LAB
BILIRUB BLD-MCNC: NEGATIVE MG/DL
CLARITY, POC: CLEAR
COLOR UR: YELLOW
EXPIRATION DATE: ABNORMAL
GLUCOSE UR STRIP-MCNC: NEGATIVE MG/DL
KETONES UR QL: ABNORMAL
LEUKOCYTE EST, POC: NEGATIVE
Lab: ABNORMAL
NITRITE UR-MCNC: NEGATIVE MG/ML
PH UR: 5.5 [PH] (ref 5–8)
PROT UR STRIP-MCNC: ABNORMAL MG/DL
RBC # UR STRIP: ABNORMAL /UL
SP GR UR: 1.03 (ref 1–1.03)
UROBILINOGEN UR QL: NORMAL

## 2024-04-17 PROCEDURE — 74018 RADEX ABDOMEN 1 VIEW: CPT

## 2024-04-17 PROCEDURE — 87086 URINE CULTURE/COLONY COUNT: CPT | Performed by: NURSE PRACTITIONER

## 2024-04-17 RX ORDER — PROMETHAZINE HYDROCHLORIDE 25 MG/1
25 TABLET ORAL EVERY 12 HOURS PRN
Qty: 20 TABLET | Refills: 0 | Status: SHIPPED | OUTPATIENT
Start: 2024-04-17

## 2024-04-17 RX ORDER — TRAMADOL HYDROCHLORIDE 50 MG/1
50 TABLET ORAL EVERY 6 HOURS PRN
Qty: 10 TABLET | Refills: 0 | Status: SHIPPED | OUTPATIENT
Start: 2024-04-17 | End: 2024-04-20

## 2024-04-17 RX ORDER — KETOROLAC TROMETHAMINE 10 MG/1
10 TABLET, FILM COATED ORAL EVERY 6 HOURS PRN
Qty: 20 TABLET | Refills: 0 | Status: SHIPPED | OUTPATIENT
Start: 2024-04-17

## 2024-04-17 RX ORDER — CYCLOBENZAPRINE HCL 10 MG
10 TABLET ORAL 2 TIMES DAILY PRN
Qty: 20 TABLET | Refills: 1 | Status: SHIPPED | OUTPATIENT
Start: 2024-04-17 | End: 2024-04-27

## 2024-04-17 RX ORDER — KETOROLAC TROMETHAMINE 30 MG/ML
60 INJECTION, SOLUTION INTRAMUSCULAR; INTRAVENOUS ONCE
Status: COMPLETED | OUTPATIENT
Start: 2024-04-17 | End: 2024-04-17

## 2024-04-17 RX ADMIN — KETOROLAC TROMETHAMINE 60 MG: 30 INJECTION, SOLUTION INTRAMUSCULAR; INTRAVENOUS at 15:34

## 2024-04-17 NOTE — PROGRESS NOTES
Chief Complaint  Recurrent UTI (Follow up) and Flank Pain (6 MONTH FOLLOW UP FOR ACUTE CYSTITIS/LEFT NEPHROLITHIASIS/FLANK/BACK/ABD PAIN)    Subjective          Sona Rosa presents to [facility] for LEFT NEPHROLITHIASIS  History of Present Illness   History of Present Illness  The patient is a pleasant 63-year-old female who returns to clinic today for evaluation. This is a 6-month follow-up with prior concerns of left ureteral/stackhorn renal stones. The patient is post left partial nephrectomy secondary to renal neoplasm.     She has had acute cystitis with hematuria /OAB/DI, complicated by bouts of urine frequency, urgency, and dysuria, Returns to clinic today for evaluation and apparent discomfort.      Last evaluated in clinic back in 10/2023, The patient was not desirous of any surgical intervention and requested to follow-up with -Dr. WINN.    Nevertheless, She does have an overactive bladder with detrusor instability with frequent flareups. She was initially started on trospium and reported remarkable improvement in her symptoms and she stopped working and she was transitioned to Myrbetriq with remarkable improvement. She has done relatively well and returns to clinic today for follow-up. Urinalysis is completely negative for leukocyte esterase. It is negative for nitrite. It is negative for gross/microscopic hematuria. A postvoid residual is 0 mL.     I reviewed her KUB completed today, which is significant for left upper quadrant mid ureteral region stone again noted. The stone is about 6 mm with a larger greater than 2 cm multifocal Starhorn calculi collections within the region of the left kidney.     We had discussed percutaneous nephrolithotomy procedure-[facility] but patient was not desirous of any surgical interventions-deferred.  The patient states she has had kidney stones for a long time, closely monitored by Dr. WINN in [place] at  who assured  patient not to attempt to remove them due to the stackhorn stones she reports.     She has had 2 to 3 kidney stones surgery in the past. She reports right greater than left flank pain today radiating to her lower back. She reports urinary symptoms, frequency, urgency. She has also tried Myrbetriq in the past with some improvement in her symptoms.    Overall, patient reports she is in apparent discomfort.  She believes her right flank pain is kidney stone related.  The patient reports experiencing right flank pain which has been persistent, currently colicky in nature, and occasionally sharp radiates into her lower back and abdomen consistent with her prior kidney stones.     She suspects may be due to a pulled muscle from mowing her lawn. She has a history of left nephrectomy due to renal neoplasm in her youth, and is currently cancer-free. She reports urinary urgency and incomplete bladder emptying, with nocturia occurring 3 to 4 times per night. Her urine is clear today.     Again patient reports that she consulted with Dr. De La Cruz in 01/2024, who recommended stone removal three-step separate procedure, which she declined due to the severity of her pain. She has undergone MRIs and CT scans. She describes the pain as internal and similar to a kidney stone. She reports nausea, for which she takes Zofran. She denies any history of gout or uric acid stones. She is currently taking Myrbetriq at bedtime, which appears to be effective. She is prediabetic.    IMPRESSION:KUB 23    Left upper quadrant mid ureteral region calculus again noted measuring  about 6 mm with larger calculi and calculi collections within region of  left kidney.  IMPRESSION MRI LUMBER 03/08/24:  1.  L4-L5 disc desiccation that in concordance with facet arthropathy  and ligamentum flavum thickening causes mild central canal stenosis and  mild bilateral neural foraminal stenosis.  2.  L5-S1 disc desiccation with posterior broad-based  "protrusion  contributing to mild central canal stenosis.     Active Ambulatory Problems     Diagnosis Date Noted    Hypertension 07/08/2016    Hyperlipidemia 07/08/2016    B12 deficiency 07/08/2016    GERD (gastroesophageal reflux disease) 07/08/2016    Fatigue 07/08/2016    Anxiety 07/28/2016    Palpitation 07/28/2016    Urgency incontinence 11/30/2016    Skin rash 05/27/2018    Vitamin D deficiency 02/19/2019    Leg cramps 02/25/2020    Left nephrolithiasis 11/12/2020    Detrusor instability 08/06/2022    Left ureteral stone 10/23/2023    Right flank pain 10/24/2023    Multiple thyroid nodules 03/24/2024    Weight gain 03/24/2024    Dysuria 04/17/2024    Acute right-sided low back pain without sciatica 04/17/2024     Resolved Ambulatory Problems     Diagnosis Date Noted    No Resolved Ambulatory Problems     Past Medical History:   Diagnosis Date    Thyroid nodule March 2024      Objective   Vital Signs:   /89   Pulse 68   Ht 170.2 cm (67\")   Wt 94.6 kg (208 lb 9.6 oz)   BMI 32.67 kg/m²       ROS:   Review of Systems   Constitutional:  Positive for activity change, appetite change, fatigue and unexpected weight gain. Negative for chills, diaphoresis, fever and unexpected weight loss.   HENT:  Negative for congestion, ear discharge, ear pain, nosebleeds, rhinorrhea, sinus pressure and sore throat.    Eyes:  Negative for blurred vision, double vision, photophobia, pain, redness and visual disturbance.   Respiratory:  Negative for apnea, cough, chest tightness, shortness of breath, wheezing and stridor.    Cardiovascular:  Negative for chest pain and palpitations.   Gastrointestinal:  Positive for abdominal distention, abdominal pain and constipation. Negative for diarrhea, nausea and vomiting.   Endocrine: Negative for polydipsia, polyphagia and polyuria.   Genitourinary:  Positive for dysuria, flank pain, frequency, pelvic pain, pelvic pressure and urinary incontinence. Negative for decreased urine " volume, difficulty urinating, dyspareunia, genital sores, hematuria, urgency and vaginal discharge.   Musculoskeletal:  Positive for back pain. Negative for arthralgias and joint swelling.   Skin:  Positive for dry skin, pallor and rash. Negative for wound.   Neurological:  Negative for dizziness, tremors, syncope, weakness, light-headedness, headache, memory problem and confusion.   Hematological:  Bruises/bleeds easily.   Psychiatric/Behavioral:  Positive for sleep disturbance and stress. Negative for behavioral problems and decreased concentration.         Physical Exam  Constitutional:       General: She is in acute distress.      Appearance: She is well-developed. She is obese. She is ill-appearing.   HENT:      Head: Normocephalic and atraumatic.   Eyes:      Pupils: Pupils are equal, round, and reactive to light.   Neck:      Thyroid: No thyromegaly.      Trachea: No tracheal deviation.   Cardiovascular:      Rate and Rhythm: Normal rate and regular rhythm.      Heart sounds: No murmur heard.  Pulmonary:      Effort: Pulmonary effort is normal. No respiratory distress.      Breath sounds: Normal breath sounds. No stridor. No wheezing.   Abdominal:      General: Bowel sounds are normal. There is distension.      Palpations: Abdomen is soft.      Tenderness: There is abdominal tenderness. There is guarding.   Genitourinary:     Labia:         Right: No tenderness.         Left: No tenderness.       Vagina: Normal. No vaginal discharge.      Comments: URINE FREQUENCY/URGENCY/EMA  Musculoskeletal:         General: Tenderness present. No deformity. Normal range of motion.      Cervical back: Normal range of motion.   Skin:     General: Skin is warm and dry.      Capillary Refill: Capillary refill takes less than 2 seconds.      Coloration: Skin is not pale.      Findings: No erythema or rash.   Neurological:      Mental Status: She is alert and oriented to person, place, and time.      Cranial Nerves: No cranial  nerve deficit.      Sensory: No sensory deficit.      Motor: Weakness present.      Coordination: Coordination normal.   Psychiatric:         Behavior: Behavior normal.         Thought Content: Thought content normal.         Judgment: Judgment normal.        Physical Exam    Result Review :     UA          10/23/2023    10:47 4/17/2024    13:45   Urinalysis   Ketones, UA Negative  Trace    Leukocytes, UA Negative  Negative      Urine Culture          10/23/2023    10:49   Urine Culture   Urine Culture No growth             Assessment and Plan    Problem List Items Addressed This Visit          Gastrointestinal Abdominal     Right flank pain    Relevant Medications    ketorolac (TORADOL) 10 MG tablet    promethazine (PHENERGAN) 25 MG tablet    traMADol (Ultram) 50 MG tablet    cyclobenzaprine (FLEXERIL) 10 MG tablet    ketorolac (TORADOL) injection 60 mg (Completed)    Other Relevant Orders    CT Abdomen Pelvis Stone Protocol       Genitourinary and Reproductive     Urgency incontinence    Left nephrolithiasis    Overview     Added automatically from request for surgery [id number]         Relevant Medications    ketorolac (TORADOL) 10 MG tablet    promethazine (PHENERGAN) 25 MG tablet    traMADol (Ultram) 50 MG tablet    cyclobenzaprine (FLEXERIL) 10 MG tablet    Other Relevant Orders    CT Abdomen Pelvis Stone Protocol    Detrusor instability    Left ureteral stone - Primary    Relevant Orders    XR abdomen kub    Dysuria    Relevant Orders    POC Urinalysis Dipstick, Automated (Completed)    Urine Culture - Urine, Urine, Clean Catch       Musculoskeletal and Injuries    Acute right-sided low back pain without sciatica    Relevant Medications    ketorolac (TORADOL) 10 MG tablet    promethazine (PHENERGAN) 25 MG tablet    traMADol (Ultram) 50 MG tablet    cyclobenzaprine (FLEXERIL) 10 MG tablet    ketorolac (TORADOL) injection 60 mg (Completed)    Other Relevant Orders    CT Abdomen Pelvis Stone Protocol     Other  Visit Diagnoses       Acute cystitis with hematuria        Relevant Medications    ketorolac (TORADOL) 10 MG tablet    promethazine (PHENERGAN) 25 MG tablet    traMADol (Ultram) 50 MG tablet    cyclobenzaprine (FLEXERIL) 10 MG tablet    Other Relevant Orders    CT Abdomen Pelvis Stone Protocol            Assessment & Plan                                         ASSESSMENT  RIGHT >Left Flank Pain / Left Ureteral Calculus/MULTIPLE LEFT STAGHORN CALCULI   Ms. Sona Rosa is a pleasant 63-year-old female with significant history of recurrent kidney stones who again presents to clinic today for evaluation of right greater than left flank pain, which she describes as colicky in nature, persistent, consistent with her prior kidney stone discomfort.  Patient is post left partial nephrectomy secondary to neoplasm.  Her urinalysis today is completely negative for any infection, it shows 1+ microscopic hematuria.  Her PVR is 0 mL.    The Patient has been diagnosed with a 6 mm left ureteral calculus.SHe has some discomfort and describes her pain as colicky,AND INtolerable. We have discussed the various parameters regarding spontaneous passage including the notion that a tiny 1-4 mm stone has a high likelihood of spontaneous passage versus a larger stone of greater than 6 mm like She has  being caught up in the upper areas of the urinary tract. We also discussed the medical management of stone disease and the use of medical expulsive therapy in the form of Flomax. This is used in an off label setting. I also talked about nonoperative management including ambulation and increasing fluids to atleast 2-3L,  and hot tubs as being an effective adjuncts in the treatment of a ureteral stone.      AGAIN, We discussed the absolute relative indicators for intervention including the presence of sepsis, and pain we cannot control as the primary need for urgent intervention.  We discussed placement of a stent if indicated and the  management of the stent as well.    Again, we discussed detrusor instability which is an  irritative bladder symptomatology most likely related to factors such as intake of bladder irritants, postinfectious irritation, prolapse, with a very large differential diagnosis.  The mainstay of treatment has been tight cholinergics which basically caused the bladder to have decreased contractility.  We have discussed the side effects of these treatments including dry mouth, double vision, and increasing constipation.  She reports doing well  on oxybutynin for symptomatic relief.     Anticholinergic medication:  I talked about the various therapeutic options including anticholinergics, beta 3 agonists, and alpha blockade if there is a component of obstruction. I discussed the side effects of anti-cholinergic including dry mouth, double vision. HOWEVER, we are recommending the use of an anticholinergic. AGAIN, We discussed the class of drugs.  We discussed the older drug such as oxybutynin with his increased spectrum of side effects such as dry mouth, dry eyes constipation versus the newer medications with lower side effects but more difficult to obtain via insurance and much more expensive finally the newest class of drugs which is Myrbetriq which has a very favorable side effect profile but unfortunately is prohibitively expensive and oftentimes requires precertification of which may be unsuccessful in many other cases-patient reports doing well on Myrbetriq 50 mg                           PLAN.    We resent her urine for culture due to concerns of dysuria, frequency urgency-will call with results.    Discussed treating infections only if they are positive    Toradol 30 mg IM x1 dose given in clinic today for her severe pain and discomfort    We discussed scheduling for left uteroscopy, laser lithotripsy with Dr. Knapp but patient is adamant.  States she will call when she is ready.     Patient has been given tamsulosin  0.4 mg to take daily, just in case and Nausea .medication  for medical expulsive therapy.       SHe is to use Toradol 10 as needed for breakthrough pain.    We discussed OTHER  treatment options for HER BACK/FLANK pain with patient encouraged to continue conservative therapy alternating NSAID/Tylenol as tolerated, Also including hot baths, showers, warm compresses to lower back as tolerated. Also encouraged walking, physical therapy, light exercises as tolerated     Rest is the most important treatment in the case of BACK/FLANK pain. Rest and physical therapy are enough to improve minor pain. Discussed to monitor his daily routine with prevention of flank pain by: At least Drinking 8 glasses of water per day, Limiting your alcohol consumption.  Having a healthy diet containing fruits, vegetables, and a lot of fluids, Practicing safe sex.  Also maintaining proper hygiene of your body as well as the environment.    Discussed a kidney stone prevention diet to include increasing p.o. fluid intake, to at least 1 to 2 L of water daily.  She is to avoid caffeine products such as cola, coffee, and to avoid soft or soda drinks.      She is to decrease her sodium consumption as in  Fast foods, gruber, salted nuts, canned foods, and smoked/cured foods. She is also to decrease her oxalate consumption, as in spinach, Jose greens, and Rhubarb.  Also important is to decrease protein intake, as in red meats, peanut butter, and also avoid nuts.     FOLLOW UP in clinic as discussed, or go to -Dr. WINN     Return to clinic sooner if need be with CT renal stone protocol     Go to ER if any worsening pain and discomfort     Patient is agreeable  WITH plan of care.    1. Right flank pain, left kidney stones, and acute right-sided back pain.  The patient is advised to schedule a follow-up visit in the clinic on Monday.     In the event of escalating pain, a CT stone protocol will be utilized for further investigation.     The patient  has been informed that she is receiving adequate narcotic pain management and will subsequently follow up at .    Patient is agreeable plan of care    Patient reports that she is not currently experiencing any symptoms of urinary incontinence.    RADIOLOGY (CT AND/OR KUB):    CT Abdomen and Pelvis: Results for orders placed during the hospital encounter of 11/10/20    CT Abdomen Pelvis With & Without Contrast    Narrative  EXAM:  CT Abdomen and Pelvis Without and With Intravenous Contrast    EXAM DATE:  11/10/2020 10:12 AM    CLINICAL HISTORY:  Flank pain, kidney stone suspected; R10.9-Unspecified abdominal pain;  Z87.442-Personal history of urinary calculi    TECHNIQUE:  Axial computed tomography images of the abdomen and pelvis without and  with intravenous contrast.  Sagittal and coronal reformatted images were  created and reviewed.  This CT exam was performed using one or more of  the following dose reduction techniques:  automated exposure control,  adjustment of the mA and/or kV according to patient size, and/or use of  iterative reconstruction technique.    COMPARISON:  01/23/2019, 02/09/2019    FINDINGS:  Lung bases:  Unremarkable.  No mass.  No consolidation.    ABDOMEN:  Liver:  Unremarkable.  No mass.  Gallbladder and bile ducts:  Unremarkable.  No calcified stones.  No  ductal dilation.  Pancreas:  Unremarkable.  No mass.  No ductal dilation.  Spleen:  Unremarkable.  No splenomegaly.  Adrenals:  Unremarkable.  No mass.  Kidneys and ureters:  Again seen are changes of partial nephrectomy  left kidney.  Surgical clips of the right kidney are stable from the  previous exam. This may be due to changes of prior partial nephrectomy  as well.  There are no ureteral stones identified.  There are multiple  nonobstructing left renal collecting system stones noted in the lower  pole region.  On coronal and sagittal sequences, there is a 1.7 x 1.8 x  1.4 cm somewhat exophytic cortical irregularity involving the  posterior  inferior aspect of the left kidney with heterogeneous enhancement  pattern that appears to be in a separate location from the nephrectomy  margins and could represent small neoplasm versus altered perfusion  dynamics from patient's previous surgery. Additionally, this does not  appear to be present on 02/09/2009 contrast-enhanced exam raising  suspicion for primary renal neoplasm.  Stomach and bowel:  Mild constipation is noted.  No obstruction.  No  mucosal thickening.    PELVIS:  Appendix:  Appendix not visualized corresponding to history of  probable appendectomy given surgical sutures in this location.  Bladder:  Unremarkable.  No mass.  No stones.  Reproductive:  Hysterectomy.    ABDOMEN and PELVIS:  Intraperitoneal space:  Unremarkable.  No free air.  No significant  fluid collection.  Bones/joints:  Degenerative changes lower lumbar spine with mild  stenosis stable from the prior exam. No acute bony findings identified.  No dislocation.  Soft tissues:  Unremarkable.  Vasculature:  Unremarkable.  No abdominal aortic aneurysm.  Lymph nodes:  Unremarkable.  No enlarged lymph nodes.    Impression  1.  1.7 x 1.8 x 1.4 cm exophytic cortical irregularity of the left  kidney posterior inferior aspect that has developed since the 2009  contrast-enhanced exam with enhancement features raising suspicion for  primary neoplasm.  2.  Bilateral partial nephrectomy changes given presence of surgical  clips.  3.  No hydronephrosis or obstructing stones.  4.  Other incidental/nonacute findings as above which appear stable from  the previous exam.    This report was finalized on 11/10/2020 10:59 AM by Dr. Randolph Ramirez MD.     CT Stone Protocol: Results for orders placed during the hospital encounter of 01/23/19    CT Abdomen Pelvis Stone Protocol    Narrative  CT ABDOMEN PELVIS STONE PROTOCOL    CLINICAL INDICATION: Stones in kidney and possibly in ureter;  N20.0-Calculus of kidney; N20.1-Calculus of  ureter.    COMPARISON: 05/17/2018    TECHNIQUE: Axial images were acquired from the lung bases through the  pubic symphysis without any IV or oral contrast.  Reformatted images were created in both the coronal and sagittal planes.    Radiation dose reduction techniques were utilized per ALARA protocol.  Automated exposure control was initiated through either or Magiq or  DoseRight software packages by  protocol.      FINDINGS:  The lung bases are clear. There are no pleural effusions.    The liver is homogeneous. There is no evidence of focal hepatic mass.    The spleen is homogeneous.    Again mildly indistinct appearance of the pancreatic head but stable.    There is no adrenal enlargement.    Bilateral renal calcifications are present and similar to the previous  exam. No hydronephrosis..    Otherwise I do not see any free fluid or walled off fluid collections.    There is no bowel wall thickening or mesenteric stranding.    There is no evidence of mesenteric or retroperitoneal adenopathy.    Arthritic change in the spine.    Impression  Overall no significant interval change since the prior  study.          This report was finalized on 1/23/2019 2:08 PM by Dr. Easton Black MD.     KUB: Results for orders placed during the hospital encounter of 10/23/23    XR Abdomen KUB    Narrative  EXAM:  XR Abdomen, 1 View    EXAM DATE:  10/23/2023 11:24 AM    CLINICAL HISTORY:  flank pain; R35.0-Frequency of micturition    TECHNIQUE:  Frontal supine view of the abdomen/pelvis.    COMPARISON:  XR Abdomen dated 10/29/2020    FINDINGS:  GASTROINTESTINAL TRACT:  Unremarkable as visualized.  No dilation.  ORGANS:  Left upper quadrant mid ureteral region calculus again noted  measuring about 6 mm with larger calculi and calculi collections within  region of left kidney.  BONES/JOINTS:  Unremarkable as visualized.    Impression  Left upper quadrant mid ureteral region calculus again noted measuring  about 6 mm with  larger calculi and calculi collections within region of  left kidney.      This report was finalized on 10/23/2023 10:29 AM by Dr. Omar Elmore MD.       [unfilled]  LABS (3 MONTHS):    Office Visit on 2024   Component Date Value Ref Range Status    Color 2024 Yellow  Yellow, Straw, Dark Yellow, Gris Final    Clarity, UA 2024 Clear  Clear Final    Specific Gravity  2024 1.030  1.005 - 1.030 Final    pH, Urine 2024 5.5  5.0 - 8.0 Final    Leukocytes 2024 Negative  Negative Final    Nitrite, UA 2024 Negative  Negative Final    Protein, POC 2024 Trace (A)  Negative mg/dL Final    Glucose, UA 2024 Negative  Negative mg/dL Final    Ketones, UA 2024 Trace (A)  Negative Final    Urobilinogen, UA 2024 Normal  Normal, 0.2 E.U./dL Final    Bilirubin 2024 Negative  Negative Final    Blood, UA 2024 Trace (A)  Negative Final    Lot Number [id number]   Final    Expiration Date 2024 10/25/2024   Final   Office Visit on 2024   Component Date Value Ref Range Status    Reference Lab Report 2024    Final                    Value:[facility]  [address] [place] [address]  Phone: [phone] or [phone]  Fax: [phone]  Bhavik Barker M.D., Medical Director    PATIENT NAME                                 LABORATORY NO.  [id number]   ELISE GODOY                             [id number]  [id number]                                     AGE                SEX     SSN            CLIENT REF #  [facility] [place] [address]       1960   F       [id number]    [id number]  [address] [facility] [address]                 REQUESTING M.D.       ATTENDING M.D..        [place] [address]                            REESE STOCK  DATE COLLECTED        DATE RECEIVED          DATE REPORTED  2024              04/01/2024    DIAGNOSIS:  THYROID, FNA, RIGHT:  Negative for malignant cells.    MICROSCOPIC DESCRIPTION:  Benign follicular nodule.  There are groups and sheets of bland appearing  follicular cells with colloid                           material and variable amounts of macrophages and  Hurthle cells.  The differential diagnosis includes multinodular goiter, an  adenomatous nodule and a colloid nodule (TBS Class II).      Professional interpretation rendered by Taran Downs M.D., BENJA at [place], [facility], [address] [place] [address].    CLINICAL HISTORY:  Multiple thyroid nodules    SPECIMENS SUBMITTED:  THYROID, FNA, RIGHT    GROSS SPECIMEN DESCRIPTION:  30 ccs of clear fluid, no sediment present, received in fixative    REVIEWED, DIAGNOSED AND ELECTRONICALLY  SIGNED BY:    Taran Downs M.D., STEFANIE.PENNY.  CPT CODES:  91791     Office Visit on 03/22/2024   Component Date Value Ref Range Status    Thyroid Peroxidase Antibody 03/22/2024 13  0 - 34 IU/mL Final    Thyroglobulin Ab 03/22/2024 <1.0  0.0 - 0.9 IU/mL Final    Thyroglobulin Antibody measured by [facility] Methodology  It should be noted that the presence of thyroglobulin antibodies  may not be pathogenic nor diagnostic, especially at very low  levels. The assay  has found that four percent of  individuals without evidence of thyroid disease or autoimmunity  will have positive TgAb levels up to 4 IU/mL.    Thyroid Stimulating Immunoglobulin 03/22/2024 <0.10  0.00 - 0.55 IU/L Final    Thyrotropin Receptor Antibody 03/22/2024 <1.10  0.00 - 1.75 IU/L Final    Insulin 03/22/2024 23.4  2.6 - 24.9 uIU/mL Final    Cortisol 03/22/2024 9.96    mcg/dL Final    ACTH 03/22/2024 13.4  7.2 - 63.3 pg/mL Final    ACTH reference interval for samples collected between 7 and 10 AM.      Smoking Cessation Counseling:  Former smoker.  Patient does not currently use any tobacco products.     Follow Up   Return in about 5 days (around 4/22/2024) for  Next scheduled follow up, KIDNEY STONE/MICRO HEMATURIA, RECURRENT UTI/DYSURIA/DETRUSSOR INSTABILITY.    Patient was given instructions and counseling regarding her condition or for health maintenance advice. Please see specific information pulled into the AVS if appropriate.          This document has been electronically signed by Griselda Cheng-Akwa, APRN   April 17, 2024 23:29 EDT    Dictated Utilizing Dragon Dictation: Part of this note may be an electronic transcription/translation of spoken language to printed text using the Dragon Dictation System.    Patient or patient representative verbalized consent for the use of Ambient Listening during the visit with  Griselda Cheng-Akwa, APRN for chart documentation. 4/17/2024  23:27 EDT

## 2024-04-19 LAB — BACTERIA SPEC AEROBE CULT: NO GROWTH

## 2024-04-22 ENCOUNTER — TELEPHONE (OUTPATIENT)
Dept: UROLOGY | Facility: CLINIC | Age: 64
End: 2024-04-22
Payer: MEDICARE

## 2024-04-22 NOTE — TELEPHONE ENCOUNTER
----- Message from Griselda Cheng-Akwa, APRN sent at 4/22/2024  1:11 PM EDT -----  Please let patient know her urine culture results are negative for any bacterial infection at this time.    Urine Culture - No growth    However she may drop off another urine sample if she feels symptomatic.    If not increase fluid p.o. fluid intake and follow-up in clinic as discussed.    Thank you

## 2024-04-23 ENCOUNTER — TELEPHONE (OUTPATIENT)
Dept: UROLOGY | Facility: CLINIC | Age: 64
End: 2024-04-23
Payer: MEDICARE

## 2024-04-23 NOTE — TELEPHONE ENCOUNTER
I called the pt and told her, her KUB results and she expressed understanding and did not want surgical intervention at this time.      ----- Message from Griselda Cheng-Akwa, APRN sent at 4/22/2024  1:22 PM EDT -----  Just ASIM. I REVIEWED THIS .  we had reviewed imaging with patient consistent with a left upper quadrant and 7 mm calculus in the region probably proximal ureter.      She is not desirous of any surgical interventions, will follow-up at  with Dr. De La Cruz treating her prior kidney stones.    She may return to clinic sooner if need be.    FINDINGS:    GASTROINTESTINAL TRACT:  Unremarkable as visualized.  No dilation.    BONES/JOINTS:  Unremarkable as visualized.  No acute fracture.    OTHER FINDINGS:  Numerous calculi noted in the left upper quadrant  again including a 7 mm calculus in region of probable proximal ureter.     IMPRESSION:    Numerous calculi noted in the left upper quadrant again including a 7  mm calculus in region of probable proximal ureter.

## 2024-08-28 ENCOUNTER — TELEPHONE (OUTPATIENT)
Dept: UROLOGY | Facility: CLINIC | Age: 64
End: 2024-08-28
Payer: MEDICARE

## 2024-08-28 DIAGNOSIS — N39.41 URGENCY INCONTINENCE: ICD-10-CM

## 2024-08-28 DIAGNOSIS — N32.81 DETRUSOR INSTABILITY: ICD-10-CM

## 2024-08-28 DIAGNOSIS — R35.0 FREQUENCY OF URINATION: ICD-10-CM

## 2024-08-28 RX ORDER — MIRABEGRON 50 MG/1
50 TABLET, EXTENDED RELEASE ORAL DAILY
Qty: 30 TABLET | Refills: 11 | Status: SHIPPED | OUTPATIENT
Start: 2024-08-28

## 2024-08-28 NOTE — TELEPHONE ENCOUNTER
Caller: Sona Rosa     Relationship: SELF    Best call back number: [phone]     Requested Prescriptions: Mirabegron ER (Myrbetriq) 50 MG tablet sustained-release 24 hour 24 hr tablet   Requested Prescriptions      No prescriptions requested or ordered in this encounter        Pharmacy where request should be sent:  [id number] [place] [address] AT [facility] [address] - [phone]  - [phone] FX     Last office visit with prescribing clinician: 4/17/2024   Last telemedicine visit with prescribing clinician: Visit date not found   Next office visit with prescribing clinician: Visit date not found     Additional details provided by patient: PT HAS BEEN OUT OF MEDS FOR ABOUT 4 DAYS    Does the patient have less than a 3 day supply:  [x] Yes  [] No    Would you like a call back once the refill request has been completed: [] Yes [x] No    If the office needs to give you a call back, can they leave a voicemail: [] Yes [x] No    Fuentes Metcalf Rep   08/28/24 10:43 EDT

## 2024-09-09 ENCOUNTER — LAB (OUTPATIENT)
Dept: UROLOGY | Facility: CLINIC | Age: 64
End: 2024-09-09
Payer: MEDICARE

## 2024-09-09 DIAGNOSIS — N39.41 URGENCY INCONTINENCE: Primary | ICD-10-CM

## 2024-09-09 PROCEDURE — 87086 URINE CULTURE/COLONY COUNT: CPT | Performed by: NURSE PRACTITIONER

## 2024-09-11 LAB — BACTERIA SPEC AEROBE CULT: NO GROWTH

## 2024-09-25 ENCOUNTER — OFFICE VISIT (OUTPATIENT)
Dept: ENDOCRINOLOGY | Facility: CLINIC | Age: 64
End: 2024-09-25
Payer: MEDICARE

## 2024-09-25 VITALS
BODY MASS INDEX: 27.25 KG/M2 | OXYGEN SATURATION: 98 % | DIASTOLIC BLOOD PRESSURE: 66 MMHG | WEIGHT: 174 LBS | SYSTOLIC BLOOD PRESSURE: 133 MMHG | HEART RATE: 64 BPM

## 2024-09-25 DIAGNOSIS — E04.2 MULTIPLE THYROID NODULES: Primary | ICD-10-CM

## 2024-09-25 PROCEDURE — 3078F DIAST BP <80 MM HG: CPT | Performed by: NURSE PRACTITIONER

## 2024-09-25 PROCEDURE — 84443 ASSAY THYROID STIM HORMONE: CPT | Performed by: NURSE PRACTITIONER

## 2024-09-25 PROCEDURE — 1159F MED LIST DOCD IN RCRD: CPT | Performed by: NURSE PRACTITIONER

## 2024-09-25 PROCEDURE — 99213 OFFICE O/P EST LOW 20 MIN: CPT | Performed by: NURSE PRACTITIONER

## 2024-09-25 PROCEDURE — 84439 ASSAY OF FREE THYROXINE: CPT | Performed by: NURSE PRACTITIONER

## 2024-09-25 PROCEDURE — 3075F SYST BP GE 130 - 139MM HG: CPT | Performed by: NURSE PRACTITIONER

## 2024-09-25 PROCEDURE — 1160F RVW MEDS BY RX/DR IN RCRD: CPT | Performed by: NURSE PRACTITIONER

## 2024-09-26 LAB
T4 FREE SERPL-MCNC: 1.1 NG/DL (ref 0.92–1.68)
TSH SERPL DL<=0.05 MIU/L-ACNC: 1.12 UIU/ML (ref 0.27–4.2)

## 2024-12-27 ENCOUNTER — TRANSCRIBE ORDERS (OUTPATIENT)
Dept: ADMINISTRATIVE | Facility: HOSPITAL | Age: 64
End: 2024-12-27
Payer: MEDICARE

## 2024-12-27 DIAGNOSIS — H53.8 BLURRED VISION: Primary | ICD-10-CM

## 2025-01-23 ENCOUNTER — HOSPITAL ENCOUNTER (OUTPATIENT)
Dept: MRI IMAGING | Facility: HOSPITAL | Age: 65
Discharge: HOME | End: 2025-01-23
Admitting: PHYSICIAN ASSISTANT
Payer: MEDICARE

## 2025-01-23 DIAGNOSIS — H53.8 BLURRED VISION: ICD-10-CM

## 2025-01-23 LAB — CREAT BLDA-MCNC: 1.1 MG/DL (ref 0.6–1.3)

## 2025-01-23 PROCEDURE — 70553 MRI BRAIN STEM W/O & W/DYE: CPT | Performed by: RADIOLOGY

## 2025-01-23 PROCEDURE — A9577 INJ MULTIHANCE: HCPCS | Performed by: PHYSICIAN ASSISTANT

## 2025-01-23 PROCEDURE — 70553 MRI BRAIN STEM W/O & W/DYE: CPT

## 2025-01-23 PROCEDURE — 25510000002 GADOBENATE DIMEGLUMINE 529 MG/ML SOLUTION: Performed by: PHYSICIAN ASSISTANT

## 2025-01-23 PROCEDURE — 82565 ASSAY OF CREATININE: CPT

## 2025-01-23 RX ADMIN — GADOBENATE DIMEGLUMINE 14 ML: 529 INJECTION, SOLUTION INTRAVENOUS at 10:38

## 2025-03-27 ENCOUNTER — OFFICE VISIT (OUTPATIENT)
Dept: ENDOCRINOLOGY | Facility: CLINIC | Age: 65
End: 2025-03-27
Payer: MEDICARE

## 2025-03-27 VITALS
SYSTOLIC BLOOD PRESSURE: 120 MMHG | WEIGHT: 156.2 LBS | HEART RATE: 60 BPM | OXYGEN SATURATION: 98 % | BODY MASS INDEX: 24.46 KG/M2 | DIASTOLIC BLOOD PRESSURE: 55 MMHG

## 2025-03-27 DIAGNOSIS — E04.2 MULTIPLE THYROID NODULES: Primary | ICD-10-CM

## 2025-03-27 RX ORDER — DULOXETIN HYDROCHLORIDE 60 MG/1
60 CAPSULE, DELAYED RELEASE ORAL DAILY
COMMUNITY
Start: 2025-03-25

## 2025-03-27 NOTE — ASSESSMENT & PLAN NOTE
She has  a stable multinodular goiter compared to prior imaging.   It is remarkably asymptomatic.  At this point I recommend a repeat ultrasound in a year

## 2025-03-27 NOTE — PROGRESS NOTES
Office Note      Date: 2025  Patient Name: Sona Rosa  MRN: [id number]  : 1960    Chief Complaint   Patient presents with    Follow-up     Multiple thyroid nodules       History of Present Illness:   Sona Rosa is a 64 y.o. female who presents for Follow-up (Multiple thyroid nodules)  .   Current rx: none     Changes in history:none   Questions /problems: none    Subjective          Review of Systems:   Review of Systems   HENT:  Negative for trouble swallowing and voice change.    Musculoskeletal:  Negative for neck pain.       The following portions of the patient's history were reviewed and updated as appropriate: allergies, current medications, past family history, past medical history, past social history, past surgical history, and problem list.    Objective     Visit Vitals  /55 (BP Location: Right arm, Patient Position: Sitting, Cuff Size: Adult)   Pulse 60   Wt 70.9 kg (156 lb 3.2 oz)   SpO2 98%   BMI 24.46 kg/m²           Physical Exam:  Physical Exam  Vitals reviewed.   Constitutional:       Appearance: Normal appearance.   Neck:      Comments: Soft irregular thyroid. No adenopathy  Lymphadenopathy:      Cervical: No cervical adenopathy.   Neurological:      Mental Status: She is alert.   Psychiatric:         Mood and Affect: Mood normal.         Behavior: Behavior normal.         Thought Content: Thought content normal.         Judgment: Judgment normal.       Assessment / Plan      Assessment & Plan:  Problem List Items Addressed This Visit       Multiple thyroid nodules - Primary    Overview   Procedure: u/s guided FNA of right thyroid nodule  Indication: right thyroid nodule  After consent was obtained and time out was called, a 27 g non- aspirating technique was used to obtain specimen from the right thyroid nodule.   Hemostasis was good. No complications. Results to follow  On the ultrasound I did not feel the left thyroid nodule warranted an FNA as is was a  spongiform tr 2 nodule   --------------------------------------------  3/27/25  Procedure: thyroid ultrasound  Indication: hx of thyroid nodule  Results: the thyroid is enlarged and heterogeneous. There are multiple nodules bilaterally.   There are 2 trad 3 nodules in the right lobe. Both measure 1.3 - 2  cm.   There are multiple spongiform trad 2 nodules in the left lobe measuring <2.5 cm   All of these are stable compared to the last ultrasound         Current Assessment & Plan   She has  a stable multinodular goiter compared to prior imaging.   It is remarkably asymptomatic.  At this point I recommend a repeat ultrasound in a year          Relevant Orders    US Thyroid        Electronically signed by : Medhat Nicolas MD   03/27/2025

## 2025-07-13 DIAGNOSIS — R35.0 FREQUENCY OF URINATION: ICD-10-CM

## 2025-07-13 DIAGNOSIS — N39.41 URGENCY INCONTINENCE: ICD-10-CM

## 2025-07-13 DIAGNOSIS — N32.81 DETRUSOR INSTABILITY: ICD-10-CM

## 2025-07-14 RX ORDER — MIRABEGRON 50 MG/1
50 TABLET, FILM COATED, EXTENDED RELEASE ORAL DAILY
Qty: 30 TABLET | Refills: 11 | Status: SHIPPED | OUTPATIENT
Start: 2025-07-14